# Patient Record
Sex: FEMALE | Race: BLACK OR AFRICAN AMERICAN | Employment: UNEMPLOYED
[De-identification: names, ages, dates, MRNs, and addresses within clinical notes are randomized per-mention and may not be internally consistent; named-entity substitution may affect disease eponyms.]

---

## 2017-01-06 ENCOUNTER — TELEPHONE (OUTPATIENT)
Dept: INTERNAL MEDICINE | Facility: CLINIC | Age: 57
End: 2017-01-06

## 2017-01-09 ENCOUNTER — TELEPHONE (OUTPATIENT)
Dept: INTERNAL MEDICINE | Facility: CLINIC | Age: 57
End: 2017-01-09

## 2017-01-27 ENCOUNTER — TELEPHONE (OUTPATIENT)
Dept: PODIATRY | Facility: CLINIC | Age: 57
End: 2017-01-27

## 2017-03-17 ENCOUNTER — OFFICE VISIT (OUTPATIENT)
Dept: INTERNAL MEDICINE | Age: 57
End: 2017-03-17
Payer: COMMERCIAL

## 2017-03-17 VITALS
HEART RATE: 84 BPM | DIASTOLIC BLOOD PRESSURE: 68 MMHG | WEIGHT: 263 LBS | SYSTOLIC BLOOD PRESSURE: 114 MMHG | BODY MASS INDEX: 46.59 KG/M2

## 2017-03-17 DIAGNOSIS — E11.65 CONTROLLED TYPE 2 DIABETES MELLITUS WITH HYPERGLYCEMIA, WITH LONG-TERM CURRENT USE OF INSULIN (HCC): Primary | ICD-10-CM

## 2017-03-17 DIAGNOSIS — F51.01 PRIMARY INSOMNIA: ICD-10-CM

## 2017-03-17 DIAGNOSIS — L23.9 ALLERGIC DERMATITIS: ICD-10-CM

## 2017-03-17 DIAGNOSIS — Z79.4 CONTROLLED TYPE 2 DIABETES MELLITUS WITH HYPERGLYCEMIA, WITH LONG-TERM CURRENT USE OF INSULIN (HCC): Primary | ICD-10-CM

## 2017-03-17 DIAGNOSIS — Z13.220 LIPID SCREENING: ICD-10-CM

## 2017-03-17 LAB — HBA1C MFR BLD: 5.4 %

## 2017-03-17 PROCEDURE — 83036 HEMOGLOBIN GLYCOSYLATED A1C: CPT | Performed by: INTERNAL MEDICINE

## 2017-03-17 PROCEDURE — 99213 OFFICE O/P EST LOW 20 MIN: CPT | Performed by: INTERNAL MEDICINE

## 2017-03-17 RX ORDER — TRIAMCINOLONE ACETONIDE 0.25 MG/G
CREAM TOPICAL
Qty: 1 TUBE | Refills: 1 | Status: SHIPPED | OUTPATIENT
Start: 2017-03-17 | End: 2018-04-05 | Stop reason: SDUPTHER

## 2017-03-17 RX ORDER — LANOLIN ALCOHOL/MO/W.PET/CERES
3 CREAM (GRAM) TOPICAL DAILY
Qty: 30 TABLET | Refills: 3 | Status: SHIPPED | OUTPATIENT
Start: 2017-03-17 | End: 2017-08-24 | Stop reason: SDUPTHER

## 2017-03-17 ASSESSMENT — PATIENT HEALTH QUESTIONNAIRE - PHQ9
1. LITTLE INTEREST OR PLEASURE IN DOING THINGS: 0
2. FEELING DOWN, DEPRESSED OR HOPELESS: 0
SUM OF ALL RESPONSES TO PHQ9 QUESTIONS 1 & 2: 0
SUM OF ALL RESPONSES TO PHQ QUESTIONS 1-9: 0

## 2017-03-22 ENCOUNTER — TELEPHONE (OUTPATIENT)
Dept: INTERNAL MEDICINE | Age: 57
End: 2017-03-22

## 2017-04-28 DIAGNOSIS — E78.2 MIXED HYPERLIPIDEMIA: ICD-10-CM

## 2017-04-28 DIAGNOSIS — I10 ESSENTIAL HYPERTENSION: ICD-10-CM

## 2017-04-28 DIAGNOSIS — R21 RASH AND NONSPECIFIC SKIN ERUPTION: ICD-10-CM

## 2017-04-28 DIAGNOSIS — E11.21 CONTROLLED TYPE 2 DIABETES MELLITUS WITH DIABETIC NEPHROPATHY, WITHOUT LONG-TERM CURRENT USE OF INSULIN (HCC): ICD-10-CM

## 2017-04-30 RX ORDER — GLIPIZIDE 5 MG/1
TABLET ORAL
Qty: 60 TABLET | Refills: 5 | Status: SHIPPED | OUTPATIENT
Start: 2017-04-30 | End: 2017-05-22 | Stop reason: SDUPTHER

## 2017-04-30 RX ORDER — CARVEDILOL 3.12 MG/1
TABLET ORAL
Qty: 60 TABLET | Refills: 5 | Status: SHIPPED | OUTPATIENT
Start: 2017-04-30 | End: 2017-05-22 | Stop reason: SDUPTHER

## 2017-04-30 RX ORDER — ATORVASTATIN CALCIUM 80 MG/1
TABLET, FILM COATED ORAL
Qty: 30 TABLET | Refills: 5 | Status: SHIPPED | OUTPATIENT
Start: 2017-04-30 | End: 2017-05-22 | Stop reason: SDUPTHER

## 2017-04-30 RX ORDER — ALCOHOL ANTISEPTIC PADS
PADS, MEDICATED (EA) TOPICAL
Qty: 30 EACH | Refills: 5 | Status: SHIPPED | OUTPATIENT
Start: 2017-04-30 | End: 2017-10-05 | Stop reason: SDUPTHER

## 2017-04-30 RX ORDER — INSULIN GLARGINE 100 [IU]/ML
INJECTION, SOLUTION SUBCUTANEOUS
Qty: 10 ML | Refills: 5 | Status: SHIPPED | OUTPATIENT
Start: 2017-04-30 | End: 2017-05-22 | Stop reason: SDUPTHER

## 2017-04-30 RX ORDER — CALCIUM CITRATE/VITAMIN D3 200MG-6.25
TABLET ORAL
Qty: 50 STRIP | Refills: 5 | Status: SHIPPED | OUTPATIENT
Start: 2017-04-30 | End: 2017-10-05 | Stop reason: SDUPTHER

## 2017-04-30 RX ORDER — GLUCOSAM/CHON-MSM1/C/MANG/BOSW 500-416.6
TABLET ORAL
Qty: 100 EACH | Refills: 5 | Status: SHIPPED | OUTPATIENT
Start: 2017-04-30 | End: 2017-10-05 | Stop reason: SDUPTHER

## 2017-04-30 RX ORDER — SYRINGE AND NEEDLE,INSULIN,1ML 31 GX5/16"
SYRINGE, EMPTY DISPOSABLE MISCELLANEOUS
Qty: 30 EACH | Refills: 5 | Status: SHIPPED | OUTPATIENT
Start: 2017-04-30 | End: 2019-01-11 | Stop reason: ALTCHOICE

## 2017-04-30 RX ORDER — DIPHENHYDRAMINE HCL 25 MG
CAPSULE ORAL
Qty: 30 CAPSULE | Refills: 0 | Status: SHIPPED | OUTPATIENT
Start: 2017-04-30 | End: 2017-05-22 | Stop reason: SDUPTHER

## 2017-05-01 ENCOUNTER — TELEPHONE (OUTPATIENT)
Dept: INTERNAL MEDICINE | Age: 57
End: 2017-05-01

## 2017-05-04 ENCOUNTER — TELEPHONE (OUTPATIENT)
Dept: INTERNAL MEDICINE | Age: 57
End: 2017-05-04

## 2017-05-22 DIAGNOSIS — K21.9 GASTROESOPHAGEAL REFLUX DISEASE WITHOUT ESOPHAGITIS: ICD-10-CM

## 2017-05-22 DIAGNOSIS — I10 ESSENTIAL HYPERTENSION: ICD-10-CM

## 2017-05-22 DIAGNOSIS — Z12.39 BREAST CANCER SCREENING: Primary | ICD-10-CM

## 2017-05-22 DIAGNOSIS — E11.21 CONTROLLED TYPE 2 DIABETES MELLITUS WITH DIABETIC NEPHROPATHY, WITHOUT LONG-TERM CURRENT USE OF INSULIN (HCC): ICD-10-CM

## 2017-05-22 DIAGNOSIS — E78.2 MIXED HYPERLIPIDEMIA: ICD-10-CM

## 2017-05-22 DIAGNOSIS — R21 RASH AND NONSPECIFIC SKIN ERUPTION: ICD-10-CM

## 2017-05-22 RX ORDER — ERGOCALCIFEROL (VITAMIN D2) 1250 MCG
50000 CAPSULE ORAL WEEKLY
Qty: 10 CAPSULE | Refills: 0 | OUTPATIENT
Start: 2017-05-22

## 2017-05-22 RX ORDER — FAMOTIDINE 20 MG/1
TABLET, FILM COATED ORAL
Qty: 60 TABLET | Refills: 5 | Status: SHIPPED | OUTPATIENT
Start: 2017-05-22 | End: 2017-11-28 | Stop reason: SDUPTHER

## 2017-05-22 RX ORDER — ATORVASTATIN CALCIUM 80 MG/1
TABLET, FILM COATED ORAL
Qty: 30 TABLET | Refills: 5 | Status: SHIPPED | OUTPATIENT
Start: 2017-05-22 | End: 2017-11-28 | Stop reason: SDUPTHER

## 2017-05-22 RX ORDER — IPRATROPIUM BROMIDE AND ALBUTEROL SULFATE 2.5; .5 MG/3ML; MG/3ML
3 SOLUTION RESPIRATORY (INHALATION) 4 TIMES DAILY PRN
Qty: 360 ML | Refills: 5 | Status: SHIPPED | OUTPATIENT
Start: 2017-05-22 | End: 2019-03-21 | Stop reason: SDUPTHER

## 2017-05-22 RX ORDER — CARVEDILOL 3.12 MG/1
TABLET ORAL
Qty: 60 TABLET | Refills: 5 | Status: SHIPPED | OUTPATIENT
Start: 2017-05-22 | End: 2017-11-28 | Stop reason: SDUPTHER

## 2017-05-22 RX ORDER — DIPHENHYDRAMINE HCL 25 MG
CAPSULE ORAL
Qty: 30 CAPSULE | Refills: 5 | Status: SHIPPED | OUTPATIENT
Start: 2017-05-22 | End: 2017-11-28 | Stop reason: SDUPTHER

## 2017-05-22 RX ORDER — GLIPIZIDE 5 MG/1
TABLET ORAL
Qty: 60 TABLET | Refills: 5 | Status: SHIPPED | OUTPATIENT
Start: 2017-05-22 | End: 2017-11-28 | Stop reason: SDUPTHER

## 2017-05-22 RX ORDER — INSULIN GLARGINE 100 [IU]/ML
INJECTION, SOLUTION SUBCUTANEOUS
Qty: 10 ML | Refills: 5 | Status: SHIPPED | OUTPATIENT
Start: 2017-05-22 | End: 2017-10-05 | Stop reason: SDUPTHER

## 2017-05-23 ENCOUNTER — TELEPHONE (OUTPATIENT)
Dept: INTERNAL MEDICINE | Age: 57
End: 2017-05-23

## 2017-05-24 ENCOUNTER — TELEPHONE (OUTPATIENT)
Dept: INTERNAL MEDICINE | Age: 57
End: 2017-05-24

## 2017-06-13 ENCOUNTER — OFFICE VISIT (OUTPATIENT)
Dept: INTERNAL MEDICINE | Age: 57
End: 2017-06-13
Payer: COMMERCIAL

## 2017-06-13 VITALS
HEART RATE: 83 BPM | DIASTOLIC BLOOD PRESSURE: 79 MMHG | BODY MASS INDEX: 47.13 KG/M2 | WEIGHT: 266 LBS | HEIGHT: 63 IN | SYSTOLIC BLOOD PRESSURE: 128 MMHG

## 2017-06-13 DIAGNOSIS — J15.9 COMMUNITY ACQUIRED BACTERIAL PNEUMONIA: Primary | ICD-10-CM

## 2017-06-13 DIAGNOSIS — F17.200 SMOKING: ICD-10-CM

## 2017-06-13 DIAGNOSIS — Z13.9 SCREENING: ICD-10-CM

## 2017-06-13 PROCEDURE — 99213 OFFICE O/P EST LOW 20 MIN: CPT | Performed by: INTERNAL MEDICINE

## 2017-06-13 RX ORDER — AZITHROMYCIN 250 MG/1
TABLET, FILM COATED ORAL
Qty: 1 PACKET | Refills: 0 | Status: SHIPPED | OUTPATIENT
Start: 2017-06-13 | End: 2017-06-23

## 2017-06-13 RX ORDER — NICOTINE 21 MG/24HR
1 PATCH, TRANSDERMAL 24 HOURS TRANSDERMAL DAILY
Qty: 30 PATCH | Refills: 3 | Status: SHIPPED | OUTPATIENT
Start: 2017-06-13 | End: 2017-10-31 | Stop reason: SDUPTHER

## 2017-06-13 RX ORDER — DOXYCYCLINE HYCLATE 100 MG
100 TABLET ORAL 2 TIMES DAILY
Qty: 10 TABLET | Refills: 0 | Status: CANCELLED | OUTPATIENT
Start: 2017-06-13 | End: 2017-06-18

## 2017-06-27 ENCOUNTER — HOSPITAL ENCOUNTER (OUTPATIENT)
Dept: MRI IMAGING | Facility: CLINIC | Age: 57
Discharge: HOME | End: 2017-06-27
Payer: COMMERCIAL

## 2017-06-27 ENCOUNTER — HOSPITAL ENCOUNTER (OUTPATIENT)
Dept: MAMMOGRAPHY | Facility: CLINIC | Age: 57
Discharge: HOME | End: 2017-06-27
Payer: COMMERCIAL

## 2017-06-27 DIAGNOSIS — M43.10 ACQUIRED SPONDYLOLISTHESIS: ICD-10-CM

## 2017-06-27 DIAGNOSIS — Z13.9 SCREENING: ICD-10-CM

## 2017-06-27 PROCEDURE — G0202 SCR MAMMO BI INCL CAD: HCPCS

## 2017-07-27 ENCOUNTER — HOSPITAL ENCOUNTER (OUTPATIENT)
Dept: MRI IMAGING | Facility: CLINIC | Age: 57
Discharge: HOME | End: 2017-07-27
Payer: COMMERCIAL

## 2017-07-27 DIAGNOSIS — M43.10 ACQUIRED SPONDYLOLISTHESIS: ICD-10-CM

## 2017-07-27 PROCEDURE — 72148 MRI LUMBAR SPINE W/O DYE: CPT

## 2017-08-01 ENCOUNTER — TELEPHONE (OUTPATIENT)
Dept: INTERNAL MEDICINE | Age: 57
End: 2017-08-01

## 2017-08-24 DIAGNOSIS — F51.01 PRIMARY INSOMNIA: ICD-10-CM

## 2017-08-24 RX ORDER — LANOLIN ALCOHOL/MO/W.PET/CERES
CREAM (GRAM) TOPICAL
Qty: 30 TABLET | Refills: 3 | Status: SHIPPED | OUTPATIENT
Start: 2017-08-24 | End: 2017-12-26 | Stop reason: SDUPTHER

## 2017-10-05 ENCOUNTER — OFFICE VISIT (OUTPATIENT)
Dept: INTERNAL MEDICINE | Age: 57
End: 2017-10-05
Payer: COMMERCIAL

## 2017-10-05 ENCOUNTER — HOSPITAL ENCOUNTER (OUTPATIENT)
Age: 57
Setting detail: SPECIMEN
Discharge: HOME | End: 2017-10-05
Payer: COMMERCIAL

## 2017-10-05 VITALS
SYSTOLIC BLOOD PRESSURE: 124 MMHG | BODY MASS INDEX: 49.96 KG/M2 | DIASTOLIC BLOOD PRESSURE: 76 MMHG | WEIGHT: 282 LBS | HEART RATE: 89 BPM | HEIGHT: 63 IN

## 2017-10-05 DIAGNOSIS — Z79.4 CONTROLLED TYPE 2 DIABETES MELLITUS WITH HYPERGLYCEMIA, WITH LONG-TERM CURRENT USE OF INSULIN (HCC): ICD-10-CM

## 2017-10-05 DIAGNOSIS — E11.65 CONTROLLED TYPE 2 DIABETES MELLITUS WITH HYPERGLYCEMIA, WITH LONG-TERM CURRENT USE OF INSULIN (HCC): Primary | ICD-10-CM

## 2017-10-05 DIAGNOSIS — I10 ESSENTIAL HYPERTENSION: ICD-10-CM

## 2017-10-05 DIAGNOSIS — E66.01 MORBID OBESITY WITH BMI OF 40.0-44.9, ADULT (HCC): ICD-10-CM

## 2017-10-05 DIAGNOSIS — D50.9 IRON DEFICIENCY ANEMIA, UNSPECIFIED IRON DEFICIENCY ANEMIA TYPE: ICD-10-CM

## 2017-10-05 DIAGNOSIS — E55.9 VITAMIN D DEFICIENCY: ICD-10-CM

## 2017-10-05 DIAGNOSIS — E11.65 CONTROLLED TYPE 2 DIABETES MELLITUS WITH HYPERGLYCEMIA, WITH LONG-TERM CURRENT USE OF INSULIN (HCC): ICD-10-CM

## 2017-10-05 DIAGNOSIS — Z79.4 CONTROLLED TYPE 2 DIABETES MELLITUS WITH HYPERGLYCEMIA, WITH LONG-TERM CURRENT USE OF INSULIN (HCC): Primary | ICD-10-CM

## 2017-10-05 LAB
CREATININE URINE: 33 MG/DL (ref 28–217)
GLUCOSE BLD-MCNC: 285 MG/DL
MICROALBUMIN/CREAT 24H UR: <12 MG/L
MICROALBUMIN/CREAT UR-RTO: 36 MCG/MG CREAT

## 2017-10-05 PROCEDURE — 82962 GLUCOSE BLOOD TEST: CPT | Performed by: INTERNAL MEDICINE

## 2017-10-05 PROCEDURE — 99213 OFFICE O/P EST LOW 20 MIN: CPT | Performed by: INTERNAL MEDICINE

## 2017-10-05 RX ORDER — OXYCODONE HYDROCHLORIDE 10 MG/1
TABLET ORAL
COMMUNITY
Start: 2017-07-12 | End: 2019-03-21

## 2017-10-05 RX ORDER — CEPHALEXIN 500 MG/1
CAPSULE ORAL
COMMUNITY
Start: 2017-09-20 | End: 2017-10-05 | Stop reason: HOSPADM

## 2017-10-05 RX ORDER — NYSTATIN 100000 [USP'U]/G
POWDER TOPICAL
COMMUNITY
Start: 2017-09-28 | End: 2018-04-05 | Stop reason: SDUPTHER

## 2017-10-05 RX ORDER — GABAPENTIN 300 MG/1
CAPSULE ORAL
COMMUNITY
Start: 2017-09-28 | End: 2017-10-05 | Stop reason: DRUGHIGH

## 2017-10-05 RX ORDER — GABAPENTIN 100 MG/1
CAPSULE ORAL
COMMUNITY
Start: 2017-08-24 | End: 2017-10-05 | Stop reason: DRUGHIGH

## 2017-10-05 RX ORDER — DOCUSATE SODIUM 100 MG/1
CAPSULE ORAL
COMMUNITY
Start: 2017-09-12 | End: 2019-10-24

## 2017-10-05 RX ORDER — OXYCODONE HYDROCHLORIDE AND ACETAMINOPHEN 5; 325 MG/1; MG/1
TABLET ORAL
COMMUNITY
Start: 2017-09-22 | End: 2018-02-15 | Stop reason: SDUPTHER

## 2017-10-05 RX ORDER — PEN NEEDLE, DIABETIC 32 GX 1/4"
NEEDLE, DISPOSABLE MISCELLANEOUS
COMMUNITY
Start: 2017-07-25 | End: 2017-10-05 | Stop reason: SDUPTHER

## 2017-10-05 RX ORDER — GABAPENTIN 600 MG/1
TABLET ORAL
COMMUNITY
Start: 2017-09-20 | End: 2019-03-21 | Stop reason: ALTCHOICE

## 2017-10-05 RX ORDER — TRAMADOL HYDROCHLORIDE 50 MG/1
TABLET ORAL
COMMUNITY
Start: 2017-09-28 | End: 2018-04-05 | Stop reason: SDUPTHER

## 2017-10-05 RX ORDER — DIAZEPAM 5 MG/1
TABLET ORAL
COMMUNITY
Start: 2017-07-12 | End: 2018-04-19 | Stop reason: ALTCHOICE

## 2017-10-05 RX ORDER — GLUCOSAM/CHON-MSM1/C/MANG/BOSW 500-416.6
TABLET ORAL
Qty: 100 EACH | Refills: 5 | Status: SHIPPED | OUTPATIENT
Start: 2017-10-05 | End: 2018-05-01 | Stop reason: SDUPTHER

## 2017-10-05 RX ORDER — SULFAMETHOXAZOLE AND TRIMETHOPRIM 800; 160 MG/1; MG/1
TABLET ORAL
COMMUNITY
Start: 2017-09-22 | End: 2018-02-15 | Stop reason: SDUPTHER

## 2017-10-05 RX ORDER — INSULIN GLARGINE 100 [IU]/ML
INJECTION, SOLUTION SUBCUTANEOUS
Qty: 10 ML | Refills: 5 | Status: SHIPPED | OUTPATIENT
Start: 2017-10-05 | End: 2017-11-06 | Stop reason: ALTCHOICE

## 2017-10-05 RX ORDER — LANOLIN ALCOHOL/MO/W.PET/CERES
3 CREAM (GRAM) TOPICAL
COMMUNITY
End: 2017-12-27 | Stop reason: SDUPTHER

## 2017-10-05 RX ORDER — SYRINGE-NEEDLE,INSULIN,0.5 ML 31 GX5/16"
SYRINGE, EMPTY DISPOSABLE MISCELLANEOUS
COMMUNITY
Start: 2017-08-24 | End: 2019-01-11 | Stop reason: ALTCHOICE

## 2017-10-05 RX ORDER — ALCOHOL ANTISEPTIC PADS
PADS, MEDICATED (EA) TOPICAL
Qty: 30 EACH | Refills: 5 | Status: SHIPPED | OUTPATIENT
Start: 2017-10-05 | End: 2019-05-31 | Stop reason: SDUPTHER

## 2017-10-05 RX ORDER — PEN NEEDLE, DIABETIC 32 GX 1/4"
NEEDLE, DISPOSABLE MISCELLANEOUS
Qty: 30 EACH | Refills: 3 | Status: SHIPPED | OUTPATIENT
Start: 2017-10-05 | End: 2018-04-06 | Stop reason: SDUPTHER

## 2017-10-05 NOTE — MR AVS SNAPSHOT
Blood Pressure Pulse Height Weight Body Mass Index Smoking Status    124/76 (Site: Left Arm, Position: Sitting, Cuff Size: Large Adult) 89 5' 3\" (1.6 m) 282 lb (127.9 kg) 49.95 kg/m2 Current Every Day Smoker      Additional Information about your Body Mass Index (BMI)           Your BMI as listed above is considered obese (30 or more). BMI is an estimate of body fat, calculated from your height and weight. The higher your BMI, the greater your risk of heart disease, high blood pressure, type 2 diabetes, stroke, gallstones, arthritis, sleep apnea, and certain cancers. BMI is not perfect. It may overestimate body fat in athletes and people who are more muscular. Even a small weight loss (between 5 and 10 percent of your current weight) by decreasing your calorie intake and becoming more physically active will help lower your risk of developing or worsening diseases associated with obesity. Learn more at: [email or web address]          Instructions    Follow-up appointment scheduled for 10/19/17, AVS given to patient. Labs given to patient, they will have them done before their next visit. Printed script for Wilfredo signed and given to pt    LJ            Today's Medication Changes          These changes are accurate as of: 10/5/17  9:32 AM.  If you have any questions, ask your nurse or doctor. CHANGE how you take these medications           * B-D SINGLE USE SWABS REGULAR Pads   Instructions:  USE AS DIRECTED   Quantity:  200 each   Refills:  6   What changed:  Another medication with the same name was changed. Make sure you understand how and when to take each. Changed by:  John Soto MD       * CURITY ALCOHOL PREPS 70 % Pads   Instructions:  USE AS DIRECTED (THREE TIMES DAILY)   Quantity:  30 each   Refills:  5   What changed:  See the new instructions.    Changed by:  John Soto MD       EASY TOUCH PEN NEEDLES 32G X 6 MM Misc atorvastatin (LIPITOR) 80 MG tablet TAKE 1 TABLET BY MOUTH ONCE DAILY    glipiZIDE (GLUCOTROL) 5 MG tablet TAKE 1 TABLET BY MOUTH 2 TIMES DAILY    carvedilol (COREG) 3.125 MG tablet TAKE 1 TABLET BY MOUTH TWICE DAILY    diphenhydrAMINE (BENADRYL) 25 MG capsule TAKE 1 CAPSULE BY MOUTH TWICE DAILY AS NEEDED FOR ITCHING AS DIRECTED    famotidine (PEPCID) 20 MG tablet TAKE 1 TABLET BY MOUTH 2 TIMES DAILY    ipratropium-albuterol (DUONEB) 0.5-2.5 (3) MG/3ML SOLN nebulizer solution Inhale 3 mLs into the lungs 4 times daily as needed for Shortness of Breath    ULTICARE INSULIN SYRINGE 31G X 5/16\" 1 ML MISC USE AS DIRECTED    triamcinolone (KENALOG) 0.025 % cream Apply topically 2 times daily.     vitamin D (ERGOCALCIFEROL) 89255 UNITS capsule Take 1 capsule by mouth once a week    Incontinence Supplies MISC Use as needed for incontinence    Alcohol Swabs (B-D SINGLE USE SWABS REGULAR) PADS USE AS DIRECTED    aspirin (ASPIRIN LOW DOSE) 81 MG EC tablet TAKE 1 TABLET DAILY    TRUEPLUS LANCETS 33G MISC USE AS DIRECTED THREE TIMES A DAY      Allergies              Lisinopril Swelling    Ace Inhibitors Swelling    ANGIOEDEMA    Ciprofloxacin Hives    Metaxalone     Other Hives, Swelling    seafood    Penicillins Hives, Swelling      We Ordered/Performed the following           Microalbumin, Ur     POCT Glucose          Result Summary for POCT Glucose      Result Information       Status          Abnormal Final result (Collected: 10/5/2017  8:43 AM)           10/5/2017  8:43 AM      Component Results     Component Value Ref Range & Units Status    Glucose 285 mg/dL Final               Additional Information        Basic Information     Date Of Birth Sex Race Ethnicity Preferred Language    1960 Female Black Non-/Non  English      Problem List as of 10/5/2017  Date Reviewed: 6/13/2017                Diverticulitis of large intestine with abscess without bleeding    Colon obstruction Acute cystitis with hematuria    Hypokalemia    Vitamin D deficiency    Hypocalcemia    Controlled type 2 diabetes mellitus with hyperglycemia, with long-term current use of insulin (HCC)    Hyperglycemia    MIKY (obstructive sleep apnea)    Atelectasis    SBO (small bowel obstruction)    Sigmoid stricture    Generalized abdominal pain    Diabetes mellitus type 2, controlled (HCC)    Chronic back pain    Narcotic abuse    Essential hypertension    Incisional hernia, without obstruction or gangrene    Wound, open, abdominal wall, anterior    Morbid obesity with BMI of 40.0-44.9, adult ([facility] [address]    Leukocytosis      Immunizations as of 10/5/2017     Name Date    Tdap (Boostrix, Adacel) 9/6/2016      Preventive Care        Date Due    Pneumococcal Vaccine - Pneumovax for adults aged 19-64 years with: chronic heart disease, chronic lung disease, diabetes mellitus, alcoholism, chronic liver disease, or cigarette smoking. (1 of 1 - PPSV23) 12/26/1980    Colonoscopy 12/26/2011    Urine Check For Kidney Problems 5/27/2016    Cholesterol Screening 5/27/2016    Eye Exam By An Eye Doctor 6/6/2017    Yearly Flu Vaccine (1) 9/1/2017    Diabetic Foot Exam 9/6/2017    HIV screening is recommended for all people regardless of risk factors  aged 15-65 years at least once (lifetime) who have never been HIV tested. 3/17/2018 (Originally 12/26/1976)    Hemoglobin A1C (Test For Long-Term Glucose Control) 3/17/2018    Pap Smear 4/7/2018    Mammograms are recommended every 2 years for low/average risk patients aged 48 - 69, and every year for high risk patients per updated national guidelines. However these guidelines can be individualized by your provider. 6/27/2019    Tetanus Combination Vaccine (2 - Td) 9/6/2026            MyChart Signup           Our records indicate that you have declined MyChart signup.

## 2017-10-05 NOTE — PROGRESS NOTES
[facility]  [facility] [address] [facility]  [address] [facility] [address] [facility]  Dept: [phone]  Dept Fax: [phone]    Office Progress/Follow Up Note  Date of patient's visit: 10/5/2017  Patient's Name:  Alta Castellanos YOB: 1960            Patient Care Team:  Jack Albright MD as PCP - General (Internal Medicine)  ================================================================    REASON FOR VISIT/CHIEF COMPLAINT:  Diabetes (runnung high for last 5 days) and Health Maintenance (patient does not want to talk about vaccines.)    HISTORY OF PRESENTING ILLNESS:  History was obtained from: patient, electronic medical record. Alta Castellanos is a 55 y.o. is here for a follow up visit because of her BG running high. Pt stopped taking lantus for reasons unknown. She states someone stopped her lantus. She is taking metformin and glipizide. Her BG is running in 300-400's. Her BG this AM was 420. BG in the clinic is 278 after she took metformin. She states she us thirsty a lot. She doesn't have increased urination. She states she is eating junk now. A1c in clinic is 9.5. It was 5.4 in 3/17.        Patient Active Problem List   Diagnosis    Diverticulitis of large intestine with abscess without bleeding    Leukocytosis    Morbid obesity with BMI of 40.0-44.9, adult ([address]    Wound, open, abdominal wall, anterior    Incisional hernia, without obstruction or gangrene    Pulmonary embolism ([address]    Essential hypertension    Chronic back pain    Narcotic abuse    Diabetes mellitus type 2, controlled ([address]    Generalized abdominal pain    SBO (small bowel obstruction)    Sigmoid stricture    Controlled type 2 diabetes mellitus with hyperglycemia, with long-term current use of insulin (HCC)    Hyperglycemia    MIKY (obstructive sleep apnea)    Atelectasis    Hypocalcemia    Vitamin D deficiency    Acute cystitis with hematuria    Hypokalemia    Colon obstruction       Health Maintenance Due   Topic Date Due    Pneumococcal med risk (1 of 1 - PPSV23) 12/26/1980    Colon cancer screen colonoscopy  12/26/2011    Diabetic microalbuminuria test  05/27/2016    Lipid screen  05/27/2016    Diabetic retinal exam  06/06/2017    Flu vaccine (1) 09/01/2017    Diabetic foot exam  09/06/2017       Allergies   Allergen Reactions    Lisinopril Swelling    Ace Inhibitors Swelling     ANGIOEDEMA    Ciprofloxacin Hives    Metaxalone     Other Hives and Swelling     seafood    Penicillins Hives and Swelling         Current Outpatient Prescriptions   Medication Sig Dispense Refill    cephALEXin (KEFLEX) 500 MG capsule       diazepam (VALIUM) 5 MG tablet       DOCQLACE 100 MG capsule       gabapentin (NEURONTIN) 100 MG capsule       gabapentin (NEURONTIN) 300 MG capsule       gabapentin (NEURONTIN) 600 MG tablet       EASY TOUCH PEN NEEDLES 32G X 6 MM MISC       TRUEPLUS INSULIN SYRINGE 31G X 5/16\" 0.5 ML MISC       nystatin (MYCOSTATIN) 241944 UNIT/GM powder       oxyCODONE HCl (OXY-IR) 10 MG immediate release tablet       oxyCODONE-acetaminophen (PERCOCET) 5-325 MG per tablet       sulfamethoxazole-trimethoprim (BACTRIM DS;SEPTRA DS) 800-160 MG per tablet       traMADol (ULTRAM) 50 MG tablet       melatonin 3 MG TABS tablet Take 3 mg by mouth      melatonin 3 MG TABS tablet TAKE ONE TABLET BY MOUTH AT BEDTIME AS DIRECTED 30 tablet 3    nicotine (NICODERM CQ) 14 MG/24HR Place 1 patch onto the skin daily 30 patch 3    atorvastatin (LIPITOR) 80 MG tablet TAKE 1 TABLET BY MOUTH ONCE DAILY 30 tablet 5    glipiZIDE (GLUCOTROL) 5 MG tablet TAKE 1 TABLET BY MOUTH 2 TIMES DAILY 60 tablet 5    insulin glargine (LANTUS) 100 UNIT/ML injection vial INJECT 30 UNITS SUBCUTANEOUSLY NIGHTLY AS DIRECTED 10 mL 5    carvedilol (COREG) 3.125 MG tablet TAKE 1 TABLET BY MOUTH TWICE DAILY 60 tablet 5    diphenhydrAMINE (BENADRYL) 25 MG capsule TAKE 1 CAPSULE BY MOUTH pain, dyspnea on exertion  RESPIRATORY: Denies: cough, hemoptysis, shortness of breath  GI: Denies: Denies: abdominal pain, flank pain  : Denies: Denies: dysuria, frequency/urgency  NEURO: Denies: dizzy/vertigo, headache  Endocrinology: increased thirst.   MUSCULOSKELETAL: Denies: back pain, joint pain  SKIN: Denies: rash, itching  ROS    PHYSICAL EXAM:  Vitals:    10/05/17 0832   BP: 124/76   Site: Left Arm   Position: Sitting   Cuff Size: Large Adult   Pulse: 89   Weight: 282 lb (127.9 kg)   Height: 5' 3\" (1.6 m)     BP Readings from Last 3 Encounters:   10/05/17 124/76   06/13/17 128/79   03/17/17 114/68      General appearance - alert, well appearing, and in no distress  Mental status - alert, oriented to person, place, and time  Chest - clear to auscultation, no wheezes, rales or rhonchi, symmetric air entry  Heart - normal rate, regular rhythm, normal S1, S2, no murmurs, rubs, clicks or gallops  Abdomen - soft, nontender, nondistended, no masses or organomegaly  Back exam - full range of motion, no tenderness, palpable spasm or pain on motion  Neurological - alert, oriented, normal speech, no focal findings or movement disorder noted  Musculoskeletal - no joint tenderness, deformity or swelling  Extremities - peripheral pulses normal, no pedal edema, no clubbing or cyanosis  Skin - normal coloration and turgor, no rashes, no suspicious skin lesions noted    Physical Exam      DIAGNOSTIC FINDINGS:  CBC:  Lab Results   Component Value Date    WBC 12.1 01/05/2017    HGB 8.4 01/05/2017     01/05/2017       BMP:    Lab Results   Component Value Date     01/05/2017    K 3.8 01/05/2017    CL 99 01/05/2017    CO2 22 01/05/2017    BUN 5 01/05/2017    CREATININE 0.42 01/05/2017    GLUCOSE 285 10/05/2017       HEMOGLOBIN A1C:   Lab Results   Component Value Date    LABA1C 5.4 03/17/2017       FASTING LIPID PANEL:  Lab Results   Component Value Date    CHOL 109 05/27/2015    HDL 32 (L) 05/27/2015    TRIG 230 (H) 12/27/2016       ASSESSMENT AND PLAN:  Kishor Rueda was seen today for diabetes and health maintenance. Diagnoses and all orders for this visit:    Controlled type 2 diabetes mellitus with hyperglycemia, with long-term current use of insulin ([facility])  -     POCT Glucose  -     EASY TOUCH PEN NEEDLES 32G X 6 MM MISC; Use to take lantus daily  -     insulin glargine (LANTUS) 100 UNIT/ML injection vial; INJECT 30 UNITS SUBCUTANEOUSLY NIGHTLY AS DIRECTED. increase Lantus by 2 units every 2-3 days till FBS is ~100-120.  -     Alcohol Swabs (CURITY ALCOHOL PREPS) 70 % PADS; USE AS DIRECTED (THREE TIMES DAILY)  -     TRUEPLUS LANCETS 28G MISC; USE AS DIRECTED THREE TIMES DAILY  -     glucose blood VI test strips (TRUE METRIX BLOOD GLUCOSE TEST) strip; USE AS DIRECTED TO TEST BLOOD SUGAR ONCE DAILY AS NEEDED AS DIRECTED  -     Microalbumin, Ur  -     metFORMIN (GLUCOPHAGE) 1000 MG tablet; Take 1 tablet by mouth 2 times daily (with meals) TAKE 1 TABLET BY MOUTH DAILY WITH BREAKFAST  -     Basic Metabolic Panel; Future  -     TSH With Reflex Ft4; Future  -     CBC; Future      FOLLOW UP AND INSTRUCTIONS:  Return in about 2 weeks (around 10/19/2017). · Kishor Rueda received counseling on the following healthy behaviors: nutrition, exercise and medication adherence    · Discussed use, benefit, and side effects of prescribed medications. Barriers to medication compliance addressed. All patient questions answered. Pt voiced understanding. · Patient given educational materials - see patient instructions      Kurt Villalpando MD,   PGY-3 Internal Medicine Resident. [id number] [facility].   10/5/2017  9:06 AM

## 2017-10-05 NOTE — PATIENT INSTRUCTIONS
Follow-up appointment scheduled for 10/19/17, AVS given to patient. Labs given to patient, they will have them done before their next visit.      Printed script for Wilfredo signed and given to pt    LJ

## 2017-10-05 NOTE — PROGRESS NOTES
 Diabetic hemoglobin A1C test  03/17/2018    Cervical cancer screen  04/07/2018    Breast cancer screen  06/27/2019    DTaP/Tdap/Td vaccine (2 - Td) 09/06/2026    Hepatitis C screen  Completed

## 2017-10-09 ENCOUNTER — TELEPHONE (OUTPATIENT)
Dept: INTERNAL MEDICINE | Age: 57
End: 2017-10-09

## 2017-10-09 RX ORDER — IBUPROFEN 200 MG
1 TABLET ORAL DAILY
Qty: 100 EACH | Refills: 3 | Status: SHIPPED | OUTPATIENT
Start: 2017-10-09 | End: 2019-01-11 | Stop reason: ALTCHOICE

## 2017-10-09 RX ORDER — ALCOHOL ANTISEPTIC PADS
PADS, MEDICATED (EA) TOPICAL
Qty: 100 EACH | Refills: 5 | Status: SHIPPED | OUTPATIENT
Start: 2017-10-09 | End: 2018-05-01 | Stop reason: SDUPTHER

## 2017-10-27 ENCOUNTER — HOSPITAL ENCOUNTER (OUTPATIENT)
Age: 57
Setting detail: SPECIMEN
Discharge: HOME | End: 2017-10-27
Payer: COMMERCIAL

## 2017-10-27 DIAGNOSIS — E55.9 VITAMIN D DEFICIENCY: ICD-10-CM

## 2017-10-27 DIAGNOSIS — Z79.4 CONTROLLED TYPE 2 DIABETES MELLITUS WITH HYPERGLYCEMIA, WITH LONG-TERM CURRENT USE OF INSULIN (HCC): ICD-10-CM

## 2017-10-27 DIAGNOSIS — R79.89 LOW VITAMIN D LEVEL: Primary | ICD-10-CM

## 2017-10-27 DIAGNOSIS — D50.9 IRON DEFICIENCY ANEMIA, UNSPECIFIED IRON DEFICIENCY ANEMIA TYPE: ICD-10-CM

## 2017-10-27 DIAGNOSIS — E11.65 CONTROLLED TYPE 2 DIABETES MELLITUS WITH HYPERGLYCEMIA, WITH LONG-TERM CURRENT USE OF INSULIN (HCC): ICD-10-CM

## 2017-10-27 LAB
ANION GAP SERPL CALCULATED.3IONS-SCNC: 14 MMOL/L (ref 9–17)
BUN BLDV-MCNC: 7 MG/DL (ref 6–20)
BUN/CREAT BLD: ABNORMAL (ref 9–20)
CALCIUM SERPL-MCNC: 9 MG/DL (ref 8.6–10.4)
CHLORIDE BLD-SCNC: 97 MMOL/L (ref 98–107)
CO2: 25 MMOL/L (ref 20–31)
CREAT SERPL-MCNC: 0.65 MG/DL (ref 0.5–0.9)
GFR AFRICAN AMERICAN: >60 ML/MIN
GFR NON-AFRICAN AMERICAN: >60 ML/MIN
GFR SERPL CREATININE-BSD FRML MDRD: ABNORMAL ML/MIN/{1.73_M2}
GFR SERPL CREATININE-BSD FRML MDRD: ABNORMAL ML/MIN/{1.73_M2}
GLUCOSE BLD-MCNC: 246 MG/DL (ref 70–99)
HCT VFR BLD CALC: 39 % (ref 36–46)
HEMOGLOBIN: 12.4 G/DL (ref 12–16)
IRON SATURATION: 20 % (ref 20–55)
IRON: 70 UG/DL (ref 37–145)
MCH RBC QN AUTO: 26.6 PG (ref 26–34)
MCHC RBC AUTO-ENTMCNC: 31.7 G/DL (ref 31–37)
MCV RBC AUTO: 83.9 FL (ref 80–100)
PDW BLD-RTO: 15.9 % (ref 12.5–15.4)
PLATELET # BLD: 313 K/UL (ref 140–450)
PMV BLD AUTO: 10.6 FL (ref 6–12)
POTASSIUM SERPL-SCNC: 3.9 MMOL/L (ref 3.7–5.3)
RBC # BLD: 4.65 M/UL (ref 4–5.2)
SODIUM BLD-SCNC: 136 MMOL/L (ref 135–144)
TOTAL IRON BINDING CAPACITY: 348 UG/DL (ref 250–450)
TSH SERPL DL<=0.05 MIU/L-ACNC: 2.26 MIU/L (ref 0.3–5)
UNSATURATED IRON BINDING CAPACITY: 278 UG/DL (ref 112–347)
VITAMIN D 25-HYDROXY: 19 NG/ML (ref 30–100)
WBC # BLD: 10.4 K/UL (ref 3.5–11)

## 2017-10-27 PROCEDURE — 80048 BASIC METABOLIC PNL TOTAL CA: CPT

## 2017-10-27 PROCEDURE — 83550 IRON BINDING TEST: CPT

## 2017-10-27 PROCEDURE — 36415 COLL VENOUS BLD VENIPUNCTURE: CPT

## 2017-10-27 PROCEDURE — 84443 ASSAY THYROID STIM HORMONE: CPT

## 2017-10-27 PROCEDURE — 82306 VITAMIN D 25 HYDROXY: CPT

## 2017-10-27 PROCEDURE — 85027 COMPLETE CBC AUTOMATED: CPT

## 2017-10-27 PROCEDURE — 83540 ASSAY OF IRON: CPT

## 2017-10-27 RX ORDER — M-VIT,TX,IRON,MINS/CALC/FOLIC 27MG-0.4MG
1 TABLET ORAL DAILY
Qty: 30 TABLET | Refills: 11 | Status: SHIPPED | OUTPATIENT
Start: 2017-10-27 | End: 2019-03-21 | Stop reason: ALTCHOICE

## 2017-10-27 RX ORDER — INSULIN GLARGINE 100 [IU]/ML
INJECTION, SOLUTION SUBCUTANEOUS
Qty: 10 ML | Refills: 5 | Status: CANCELLED | OUTPATIENT
Start: 2017-10-27

## 2017-10-27 NOTE — TELEPHONE ENCOUNTER
Pt calling she lost script for the Lantus and would like that sent to [facility]. Pt also needs a script for nebulizer machine and wants that sent to [facility] pended medication. Script for nebulizer will need to be printed out so we can fax it.

## 2017-10-31 DIAGNOSIS — F17.200 SMOKING: ICD-10-CM

## 2017-11-03 ENCOUNTER — TELEPHONE (OUTPATIENT)
Dept: INTERNAL MEDICINE | Age: 57
End: 2017-11-03

## 2017-11-03 DIAGNOSIS — E11.8 DM (DIABETES MELLITUS) WITH COMPLICATIONS (HCC): Primary | ICD-10-CM

## 2017-11-03 NOTE — TELEPHONE ENCOUNTER
PA request received for Lantus. This is non-formulary.  Pt must try/fail/have contraindication to formulary agent(s):Basaglar

## 2017-11-06 NOTE — TELEPHONE ENCOUNTER
PC from patient returning call, let her know that the Lantus wasn't covered under the insurance so they would be switching her to the basaglar. Pt grew irritated wanting to know why as that was what she had always been given, tried letting patient know it was due to changes in the insurances formulary and what they would cover. But the [facility] was another form of insulin that was covered and the [facility] for it had been sent to the pharmacy. Pt said so you guys just have to find a way yo give me a kwikpen well fuck that! I will be in on the 30th and I will discuss that with my doctor then! And hung up.

## 2017-11-17 ENCOUNTER — TELEPHONE (OUTPATIENT)
Dept: INTERNAL MEDICINE | Age: 57
End: 2017-11-17

## 2017-11-17 DIAGNOSIS — N89.8 VAGINAL DISCHARGE: Primary | ICD-10-CM

## 2017-11-17 NOTE — TELEPHONE ENCOUNTER
Pt calling states that since she started taking Multiple Vitamins-Minerals she had been itching, She is also having yellow vaginal discharge, pt is currently using powder to help with the itching. Pt states it only started since taking the medication. Pt states she also had a increase of Metformin. Pt wants to know if there is anything she can take to relive the itching. Please advise pt also states as of today she will stop taking the vitamin.

## 2017-11-28 DIAGNOSIS — K21.9 GASTROESOPHAGEAL REFLUX DISEASE WITHOUT ESOPHAGITIS: ICD-10-CM

## 2017-11-28 DIAGNOSIS — I10 ESSENTIAL HYPERTENSION: ICD-10-CM

## 2017-11-28 DIAGNOSIS — R21 RASH AND NONSPECIFIC SKIN ERUPTION: ICD-10-CM

## 2017-11-28 DIAGNOSIS — E78.2 MIXED HYPERLIPIDEMIA: ICD-10-CM

## 2017-11-28 DIAGNOSIS — E11.21 CONTROLLED TYPE 2 DIABETES MELLITUS WITH DIABETIC NEPHROPATHY, WITHOUT LONG-TERM CURRENT USE OF INSULIN (HCC): ICD-10-CM

## 2017-11-29 NOTE — TELEPHONE ENCOUNTER
E-scribe request for [employer]. Please review and e-scribe if applicable. Next Visit Date:  Pt has appt tomorrow  Future Appointments  Date Time Provider Genna Tran   11/30/2017 3:05 PM Kurt Villalpando MD [facility] IM [facility]   1/4/2018 2:00 PM Stephen Moore DO [facility] OB/Gyn [facility]rt Maintenance   Topic Date Due    Pneumococcal med risk (1 of 1 - PPSV23) 12/26/1980    Colon cancer screen colonoscopy  12/26/2011    Lipid screen  05/27/2016    Diabetic retinal exam  06/06/2017    Flu vaccine (1) 09/01/2017    Diabetic foot exam  09/06/2017    HIV screen  03/17/2018 (Originally 12/26/1976)    Diabetic hemoglobin A1C test  03/17/2018    Cervical cancer screen  04/07/2018    Diabetic microalbuminuria test  10/05/2018    Breast cancer screen  06/27/2019    DTaP/Tdap/Td vaccine (2 - Td) 09/06/2026    Hepatitis C screen  Completed               (applicable per patient's age: Cancer Screenings, Depression Screening, Fall Risk Screening, Immunizations)    Hemoglobin A1C (%)   Date Value   03/17/2017 5.4   09/06/2016 6.2   03/01/2016 6.4     Microalb/Crt.  Ratio (mcg/mg creat)   Date Value   10/05/2017 36 (H)     LDL Cholesterol (mg/dL)   Date Value   05/27/2015 52     AST (U/L)   Date Value   01/02/2017 17     ALT (U/L)   Date Value   01/02/2017 12     BUN (mg/dL)   Date Value   10/27/2017 7      (goal A1C is < 7)   (goal LDL is <100) need 30-50% reduction from baseline     BP Readings from Last 3 Encounters:   10/05/17 124/76   06/13/17 128/79   03/17/17 114/68    (goal /80)      All Future Testing planned in [facility]:  Lab Frequency Next Occurrence   Lipid Panel Once 12/12/2017            Patient Active Problem List:     Diverticulitis of large intestine with abscess without bleeding     Leukocytosis     Morbid obesity with BMI of 40.0-44.9, adult ([address]     Wound, open, abdominal wall, anterior     Incisional hernia, without obstruction or gangrene     Pulmonary embolism (HCC)     Essential hypertension     Chronic back pain     Narcotic abuse     Diabetes mellitus type 2, controlled ([address]     Generalized abdominal pain     SBO (small bowel obstruction)     Sigmoid stricture     Controlled type 2 diabetes mellitus with hyperglycemia, with long-term current use of insulin (HCC)     Hyperglycemia     MIKY (obstructive sleep apnea)     Atelectasis     Hypocalcemia     Vitamin D deficiency     Acute cystitis with hematuria     Hypokalemia     Colon obstruction

## 2017-11-30 RX ORDER — FAMOTIDINE 20 MG/1
TABLET, FILM COATED ORAL
Qty: 60 TABLET | Refills: 3 | Status: SHIPPED | OUTPATIENT
Start: 2017-11-30 | End: 2018-04-06 | Stop reason: SDUPTHER

## 2017-11-30 RX ORDER — GLIPIZIDE 5 MG/1
TABLET ORAL
Qty: 60 TABLET | Refills: 3 | Status: SHIPPED | OUTPATIENT
Start: 2017-11-30 | End: 2018-04-06 | Stop reason: SDUPTHER

## 2017-11-30 RX ORDER — CARVEDILOL 3.12 MG/1
TABLET ORAL
Qty: 60 TABLET | Refills: 3 | Status: SHIPPED | OUTPATIENT
Start: 2017-11-30 | End: 2018-04-06 | Stop reason: SDUPTHER

## 2017-11-30 RX ORDER — DIPHENHYDRAMINE HCL 25 MG
CAPSULE ORAL
Qty: 30 CAPSULE | Refills: 1 | Status: SHIPPED | OUTPATIENT
Start: 2017-11-30 | End: 2018-04-05 | Stop reason: SDUPTHER

## 2017-11-30 RX ORDER — ATORVASTATIN CALCIUM 80 MG/1
TABLET, FILM COATED ORAL
Qty: 30 TABLET | Refills: 3 | Status: SHIPPED | OUTPATIENT
Start: 2017-11-30 | End: 2018-04-06 | Stop reason: SDUPTHER

## 2017-12-26 DIAGNOSIS — F51.01 PRIMARY INSOMNIA: ICD-10-CM

## 2017-12-26 NOTE — TELEPHONE ENCOUNTER
hypertension     Chronic back pain     Narcotic abuse     Diabetes mellitus type 2, controlled ([address]     Generalized abdominal pain     SBO (small bowel obstruction)     Sigmoid stricture     Controlled type 2 diabetes mellitus with hyperglycemia, with long-term current use of insulin ([facility])     Hyperglycemia     MIKY (obstructive sleep apnea)     Atelectasis     Hypocalcemia     Vitamin D deficiency     Acute cystitis with hematuria     Hypokalemia     Colon obstruction

## 2017-12-27 RX ORDER — LANOLIN ALCOHOL/MO/W.PET/CERES
CREAM (GRAM) TOPICAL
Qty: 30 TABLET | Refills: 3 | Status: SHIPPED | OUTPATIENT
Start: 2017-12-27 | End: 2018-04-19

## 2018-01-03 DIAGNOSIS — E11.21 CONTROLLED TYPE 2 DIABETES MELLITUS WITH DIABETIC NEPHROPATHY, WITHOUT LONG-TERM CURRENT USE OF INSULIN (HCC): ICD-10-CM

## 2018-01-03 RX ORDER — ASPIRIN 81 MG
TABLET, DELAYED RELEASE (ENTERIC COATED) ORAL
Qty: 30 TABLET | Refills: 3 | Status: SHIPPED | OUTPATIENT
Start: 2018-01-03 | End: 2018-06-03 | Stop reason: SDUPTHER

## 2018-01-10 ENCOUNTER — HOSPITAL ENCOUNTER (OUTPATIENT)
Age: 58
Setting detail: SPECIMEN
Discharge: HOME | End: 2018-01-10
Payer: COMMERCIAL

## 2018-01-10 ENCOUNTER — OFFICE VISIT (OUTPATIENT)
Dept: OBGYN | Age: 58
End: 2018-01-10
Payer: COMMERCIAL

## 2018-01-10 VITALS
BODY MASS INDEX: 49.04 KG/M2 | HEIGHT: 63 IN | SYSTOLIC BLOOD PRESSURE: 135 MMHG | DIASTOLIC BLOOD PRESSURE: 81 MMHG | WEIGHT: 276.8 LBS | HEART RATE: 91 BPM

## 2018-01-10 DIAGNOSIS — N89.8 VAGINAL DISCHARGE: ICD-10-CM

## 2018-01-10 DIAGNOSIS — Z00.00 PREVENTATIVE HEALTH CARE: ICD-10-CM

## 2018-01-10 DIAGNOSIS — Z01.419 ENCOUNTER FOR WELL WOMAN EXAM WITH ROUTINE GYNECOLOGICAL EXAM: Primary | ICD-10-CM

## 2018-01-10 DIAGNOSIS — B37.31 YEAST INFECTION INVOLVING THE VAGINA AND SURROUNDING AREA: ICD-10-CM

## 2018-01-10 PROCEDURE — 99386 PREV VISIT NEW AGE 40-64: CPT | Performed by: OBSTETRICS & GYNECOLOGY

## 2018-01-10 RX ORDER — CLOTRIMAZOLE AND BETAMETHASONE DIPROPIONATE 10; .64 MG/G; MG/G
CREAM TOPICAL
Qty: 1 TUBE | Refills: 1 | Status: SHIPPED | OUTPATIENT
Start: 2018-01-10 | End: 2018-04-05 | Stop reason: SDUPTHER

## 2018-01-10 RX ORDER — FLUCONAZOLE 150 MG/1
150 TABLET ORAL ONCE
Qty: 2 TABLET | Refills: 0 | Status: SHIPPED | OUTPATIENT
Start: 2018-01-10 | End: 2018-01-10

## 2018-01-10 RX ORDER — NYSTATIN 100000 [USP'U]/G
POWDER TOPICAL
Qty: 1 BOTTLE | Refills: 2 | Status: SHIPPED | OUTPATIENT
Start: 2018-01-10 | End: 2018-08-28 | Stop reason: SDUPTHER

## 2018-01-10 NOTE — PROGRESS NOTES
History and Physical    Silviano Ochoa  1/10/2018              65 y.o. Chief Complaint   Patient presents with    Gynecologic Exam     NP        No LMP recorded. Patient is postmenopausal.             Primary Care Physician: Dina Salamanca MD    The patient was seen and examined. She has no chief complaint today and is here for her annual exam.  Her bowels are regular. There are no voiding complaints. She denies any bloating. She denies vaginal discharge and was counseled on STD's and the need for barrier contraception. HPI : Silviano Ochoa is a 65 y.o. female     Pt here for annual exam.  She is also complaining of vaginal discharge and itching, also has the itching under her abdomen. She has used nystatin powder and it did help - but she ran out of the nystatin. This itching/discharge has been present for months. Patient is very angry and is using fowl language during the entire visit - she seems very upset because she does not like the care she is getting from the clinics here, she states \"they don't do shit upstairs. \"  Most of the anger issues seem to stem from her long standing medical issues and requiring multiple bowel surgeries and now having a colostomy bag. Pt has been admitted in the past to the hospital for 2-3 months because of complications. Pt does have a support network at home, but they are not as active in her care as they used to be.   Patient has 7 children (5 sons and 2 daughters) and her daughters were helping her, but they are struggling with balancing their jobs, schooling and their own children.    ________________________________________________________________________  Obstetric History       T0      L0     SAB0   TAB0   Ectopic0   Molar0   Multiple0   Live Births0       # Outcome Date GA Lbr Yaya/2nd Weight Sex Delivery Anes PTL Lv   10             9             8             7 Para            6 Para            5 Para 4 Para            3 Para            2 Para            1 Para                 Past Medical History:   Diagnosis Date    Allergic rhinitis     Arthritis     LT. KNEE    Chronic back pain     Diverticulitis     GERD (gastroesophageal reflux disease)     Headache(784.0)     Hyperlipidemia     Hypertension     Knee pain     left knee pain    MDRO (multiple drug resistant organisms) resistance 2/24/2014    E. Coli urine    Narcotic abuse 7/28/2015    Obesity     Osteoarthritis     Rectovaginal fistula     Type II or unspecified type diabetes mellitus without mention of complication, not stated as uncontrolled     Urinary incontinence                                                                    Past Surgical History:   Procedure Laterality Date    ABDOMEN SURGERY  5/1/16    laprascopic ventral hernia repair with TRINH    ABDOMEN SURGERY  01/21/2014    Takedown of colovaginal fistula    ABDOMINAL EXPLORATION SURGERY  01/02/2017    mesh replacement, wound washout with wound vac application    KNEE ARTHROPLASTY      bilateral knees    VENTRAL HERNIA REPAIR  [id number]     Family History   Problem Relation Age of Onset    Diabetes Mother     Cancer Father      COLON, PROSTATE    Heart Disease Paternal Aunt      Social History     Social History    Marital status: Single     Spouse name: N/A    Number of children: N/A    Years of education: N/A     Occupational History    Not on file.      Social History Main Topics    Smoking status: Current Every Day Smoker     Packs/day: 0.50     Years: 30.00     Types: Cigarettes    Smokeless tobacco: Never Used    Alcohol use No    Drug use: No    Sexual activity: No     Other Topics Concern    Not on file     Social History Narrative    No narrative on file       MEDICATIONS:  Current Outpatient Prescriptions   Medication Sig Dispense Refill    Nebulizers ([facility]) MISC       fluconazole (DIFLUCAN) 150 MG tablet Take 1 tablet by mouth once for 1 dose 2 tablet 0    nystatin (MYCOSTATIN) 203511 UNIT/GM powder Apply 3 times daily. 1 Bottle 2    clotrimazole-betamethasone (LOTRISONE) 1-0.05 % cream Apply to the vaginal/rectal area bid externally x 4 days then daily for 3 days 1 Tube 1    ASPIRIN LOW DOSE 81 MG EC tablet TAKE ONE TABLET BY MOUTH DAILY 30 tablet 3    melatonin 3 MG TABS tablet TAKE ONE TABLET BY MOUTH AT BEDTIME AS DIRECTED 30 tablet 3    diphenhydrAMINE (BENADRYL) 25 MG capsule TAKE ONE CAPSULE BY MOUTH TWICE DAILY AS NEEDED FOR ITCHING 30 capsule 1    glipiZIDE (GLUCOTROL) 5 MG tablet TAKE ONE TABLET BY MOUTH TWICE DAILY 60 tablet 3    famotidine (PEPCID) 20 MG tablet TAKE ONE TABLET BY MOUTH TWICE DAILY 60 tablet 3    carvedilol (COREG) 3.125 MG tablet TAKE ONE TABLET BY MOUTH TWICE DAILY 60 tablet 3    atorvastatin (LIPITOR) 80 MG tablet TAKE ONE TABLET BY MOUTH ONCE DAILY 30 tablet 3    insulin glargine (BASAGLAR KWIKPEN) 100 UNIT/ML injection pen Inject 30 Units into the skin nightly 5 Pen 3    NICOTINE STEP 2 14 MG/24HR PLACE 1 PATCH ONTO THE SKIN DAILY 30 patch 5    Multiple Vitamins-Minerals (THERAPEUTIC MULTIVITAMIN-MINERALS) tablet Take 1 tablet by mouth daily 30 tablet 11    INSULIN SYRINGE .5CC/29G 29G X 1/2\" 0.5 ML MISC 1 each by Does not apply route daily 100 each 3    ULTICARE ALCOHOL SWABS 70 % PADS Use before insulin administration and checking blood sugars.  100 each 5    diazepam (VALIUM) 5 MG tablet       DOCQLACE 100 MG capsule       gabapentin (NEURONTIN) 600 MG tablet       TRUEPLUS INSULIN SYRINGE 31G X 5/16\" 0.5 ML MISC       nystatin (MYCOSTATIN) 822021 UNIT/GM powder       oxyCODONE HCl (OXY-IR) 10 MG immediate release tablet       oxyCODONE-acetaminophen (PERCOCET) 5-325 MG per tablet       sulfamethoxazole-trimethoprim (BACTRIM DS;SEPTRA DS) 800-160 MG per tablet       traMADol (ULTRAM) 50 MG tablet       EASY TOUCH PEN NEEDLES 32G X 6 MM MISC Use to take lantus daily 30 each 3  Alcohol Swabs (CURITY ALCOHOL PREPS) 70 % PADS USE AS DIRECTED (THREE TIMES DAILY) 30 each 5    TRUEPLUS LANCETS 28G MISC USE AS DIRECTED THREE TIMES DAILY 100 each 5    glucose blood VI test strips (TRUE METRIX BLOOD GLUCOSE TEST) strip USE AS DIRECTED TO TEST BLOOD SUGAR ONCE DAILY AS NEEDED AS DIRECTED 50 strip 5    metFORMIN (GLUCOPHAGE) 1000 MG tablet Take 1 tablet by mouth 2 times daily (with meals) TAKE 1 TABLET BY MOUTH DAILY WITH BREAKFAST 60 tablet 5    ipratropium-albuterol (DUONEB) 0.5-2.5 (3) MG/3ML SOLN nebulizer solution Inhale 3 mLs into the lungs 4 times daily as needed for Shortness of Breath 360 mL 5    ULTICARE INSULIN SYRINGE 31G X 5/16\" 1 ML MISC USE AS DIRECTED 30 each 5    triamcinolone (KENALOG) 0.025 % cream Apply topically 2 times daily. 1 Tube 1    vitamin D (ERGOCALCIFEROL) 18608 UNITS capsule Take 1 capsule by mouth once a week 10 capsule 0    Incontinence Supplies MISC Use as needed for incontinence 100 each 5    Alcohol Swabs (B-D SINGLE USE SWABS REGULAR) PADS USE AS DIRECTED 200 each 6    TRUEPLUS LANCETS 33G MISC USE AS DIRECTED THREE TIMES A  each 0    Respiratory Therapy Supplies (NEBULIZER COMPRESSOR) KIT 1 kit by Does not apply route once for 1 dose 1 kit 0     No current facility-administered medications for this visit. ALLERGIES:  Allergies as of 01/10/2018 - Review Complete 01/10/2018   Allergen Reaction Noted    Lisinopril Swelling 03/03/2015    Ace inhibitors Swelling 02/26/2015    Ciprofloxacin Hives 09/26/2013    Metaxalone  01/16/2017    Other Hives and Swelling 10/17/2013    Penicillins Hives and Swelling 05/24/2013       Symptoms of decreased mood absent  Symptoms of anhedonia absent      Immunization status: stated as current, but no records available. Gynecologic History:  Menarche: 8 yo  Menopause: pt was 41 when      No LMP recorded.  Patient is postmenopausal.    Sexually Active: No    STD History: No     Permanent Sterilization: No   Reversible Birth Control: No        Hormone Replacement Exposure: No      Genetic Qualified Family History of Breast, Ovarian , Colon or Uterine Cancer: Yes - maternal great aunt had breast cancer and pt's mother had breast cancer last year (over 54yo of age at diagnosis)     If YES see scanned worksheet. Preventative Health Testing:    Health Maintenance:  Health Maintenance Due   Topic Date Due    Pneumococcal med risk (1 of 1 - PPSV23) 12/26/1980    Colon cancer screen colonoscopy  12/26/2011    Lipid screen  05/27/2016    Diabetic retinal exam  06/06/2017    Flu vaccine (1) 09/01/2017    Diabetic foot exam  09/06/2017       Date of Last Pap Smear: long time ago pt states  Abnormal Pap Smear History: denies  Colposcopy History: denies  Date of Last Mammogram: 6/27/2017 - birads 1  Date of Last Colonoscopy: pt has had a colonoscopy - she has had multiple bowl surgeries  Date of Last Bone Density: n/a      ________________________________________________________________________      REVIEW OF SYSTEMS:       A minimum of an eleven point review of systems was completed. Review Of Systems (11 point):  Constitutional: No fever, chills or malaise; No weight change or fatigue  Head and Eyes: No vision, Headache, Dizziness or trauma in last 12 months  ENT ROS: No hearing, Tinnitis, sinus or taste problems  Hematological and Lymphatic ROS:No Lymphoma, Von Willebrand's, Hemophillia or Bleeding History  Psych ROS: No Depression, Homicidal thoughts,suicidal thoughts, or anxiety  Breast ROS: No prior breast abnormalities or lumps  Respiratory ROS: No SOB, Pneumoniae,Cough, or Pulmonary Embolism History  Cardiovascular ROS: No Chest Pain with Exertion, Palpitations, Syncope, Edema, Arrhythmia  Gastrointestinal ROS: No Indigestion, Heartburn, Nausea, vomiting, Diarrhea, Constipation,or Bowel Changes;  No Bloody Stools or melena  Genito-Urinary ROS:+vaginal discharge/itching No Dysuria, Hematuria or

## 2018-01-11 LAB
C TRACH DNA GENITAL QL NAA+PROBE: NEGATIVE
DIRECT EXAM: ABNORMAL
Lab: ABNORMAL
N. GONORRHOEAE DNA: NEGATIVE
SPECIMEN DESCRIPTION: ABNORMAL
STATUS: ABNORMAL

## 2018-01-11 RX ORDER — FLUCONAZOLE 150 MG/1
150 TABLET ORAL ONCE
Qty: 1 TABLET | Refills: 1 | Status: SHIPPED | OUTPATIENT
Start: 2018-01-11 | End: 2018-01-11

## 2018-01-13 LAB
HPV SAMPLE: NORMAL
HPV SOURCE: NORMAL
HPV, GENOTYPE 16: NOT DETECTED
HPV, GENOTYPE 18: NOT DETECTED
HPV, HIGH RISK OTHER: NOT DETECTED
HPV, INTERPRETATION: NORMAL

## 2018-01-19 LAB — CYTOLOGY REPORT: NORMAL

## 2018-02-15 ENCOUNTER — APPOINTMENT (OUTPATIENT)
Dept: CT IMAGING | Age: 58
End: 2018-02-15
Payer: COMMERCIAL

## 2018-02-15 ENCOUNTER — HOSPITAL ENCOUNTER (EMERGENCY)
Age: 58
Discharge: HOME | End: 2018-02-15
Attending: EMERGENCY MEDICINE
Payer: COMMERCIAL

## 2018-02-15 VITALS
OXYGEN SATURATION: 97 % | TEMPERATURE: 98.2 F | WEIGHT: 270 LBS | RESPIRATION RATE: 18 BRPM | SYSTOLIC BLOOD PRESSURE: 146 MMHG | HEART RATE: 107 BPM | HEIGHT: 63 IN | DIASTOLIC BLOOD PRESSURE: 81 MMHG | BODY MASS INDEX: 47.84 KG/M2

## 2018-02-15 DIAGNOSIS — K57.92 DIVERTICULITIS OF INTESTINE, UNSPECIFIED BLEEDING STATUS, UNSPECIFIED COMPLICATION STATUS, UNSPECIFIED PART OF INTESTINAL TRACT: Primary | ICD-10-CM

## 2018-02-15 LAB
-: ABNORMAL
ABSOLUTE EOS #: 0.16 K/UL (ref 0–0.44)
ABSOLUTE IMMATURE GRANULOCYTE: 0.04 K/UL (ref 0–0.3)
ABSOLUTE LYMPH #: 2.46 K/UL (ref 1.1–3.7)
ABSOLUTE MONO #: 0.94 K/UL (ref 0.1–1.2)
ALBUMIN SERPL-MCNC: 3.3 G/DL (ref 3.5–5.2)
ALBUMIN/GLOBULIN RATIO: 0.7 (ref 1–2.5)
ALP BLD-CCNC: 209 U/L (ref 35–104)
ALT SERPL-CCNC: 61 U/L (ref 5–33)
AMORPHOUS: ABNORMAL
ANION GAP SERPL CALCULATED.3IONS-SCNC: 13 MMOL/L (ref 9–17)
AST SERPL-CCNC: 69 U/L
BACTERIA: ABNORMAL
BASOPHILS # BLD: 0 % (ref 0–2)
BASOPHILS ABSOLUTE: 0.05 K/UL (ref 0–0.2)
BILIRUB SERPL-MCNC: 0.22 MG/DL (ref 0.3–1.2)
BILIRUBIN URINE: NEGATIVE
BUN BLDV-MCNC: 5 MG/DL (ref 6–20)
BUN/CREAT BLD: ABNORMAL (ref 9–20)
CALCIUM SERPL-MCNC: 8.7 MG/DL (ref 8.6–10.4)
CASTS UA: ABNORMAL /LPF (ref 0–8)
CHLORIDE BLD-SCNC: 96 MMOL/L (ref 98–107)
CO2: 24 MMOL/L (ref 20–31)
COLOR: YELLOW
COMMENT UA: ABNORMAL
CREAT SERPL-MCNC: 0.59 MG/DL (ref 0.5–0.9)
CRYSTALS, UA: ABNORMAL /HPF
DIFFERENTIAL TYPE: ABNORMAL
EOSINOPHILS RELATIVE PERCENT: 1 % (ref 1–4)
EPITHELIAL CELLS UA: ABNORMAL /HPF (ref 0–5)
GFR AFRICAN AMERICAN: >60 ML/MIN
GFR NON-AFRICAN AMERICAN: >60 ML/MIN
GFR SERPL CREATININE-BSD FRML MDRD: ABNORMAL ML/MIN/{1.73_M2}
GFR SERPL CREATININE-BSD FRML MDRD: ABNORMAL ML/MIN/{1.73_M2}
GLUCOSE BLD-MCNC: 256 MG/DL (ref 70–99)
GLUCOSE URINE: ABNORMAL
HCT VFR BLD CALC: 35.4 % (ref 36.3–47.1)
HEMOGLOBIN: 11 G/DL (ref 11.9–15.1)
IMMATURE GRANULOCYTES: 0 %
KETONES, URINE: NEGATIVE
LACTIC ACID, WHOLE BLOOD: 1.8 MMOL/L (ref 0.7–2.1)
LEUKOCYTE ESTERASE, URINE: NEGATIVE
LIPASE: 17 U/L (ref 13–60)
LYMPHOCYTES # BLD: 21 % (ref 24–43)
MCH RBC QN AUTO: 26.9 PG (ref 25.2–33.5)
MCHC RBC AUTO-ENTMCNC: 31.1 G/DL (ref 28.4–34.8)
MCV RBC AUTO: 86.6 FL (ref 82.6–102.9)
MONOCYTES # BLD: 8 % (ref 3–12)
MUCUS: ABNORMAL
NITRITE, URINE: NEGATIVE
NRBC AUTOMATED: 0 PER 100 WBC
OTHER OBSERVATIONS UA: ABNORMAL
PDW BLD-RTO: 13.7 % (ref 11.8–14.4)
PH UA: 6 (ref 5–8)
PLATELET # BLD: 348 K/UL (ref 138–453)
PLATELET ESTIMATE: ABNORMAL
PMV BLD AUTO: 11.3 FL (ref 8.1–13.5)
POTASSIUM SERPL-SCNC: 3.6 MMOL/L (ref 3.7–5.3)
PROTEIN UA: ABNORMAL
RBC # BLD: 4.09 M/UL (ref 3.95–5.11)
RBC # BLD: ABNORMAL 10*6/UL
RBC UA: ABNORMAL /HPF (ref 0–4)
RENAL EPITHELIAL, UA: ABNORMAL /HPF
SEG NEUTROPHILS: 70 % (ref 36–65)
SEGMENTED NEUTROPHILS ABSOLUTE COUNT: 7.86 K/UL (ref 1.5–8.1)
SODIUM BLD-SCNC: 133 MMOL/L (ref 135–144)
SPECIFIC GRAVITY UA: 1.02 (ref 1–1.03)
TOTAL PROTEIN: 8 G/DL (ref 6.4–8.3)
TRICHOMONAS: ABNORMAL
TURBIDITY: ABNORMAL
URINE HGB: NEGATIVE
UROBILINOGEN, URINE: NORMAL
WBC # BLD: 11.5 K/UL (ref 3.5–11.3)
WBC # BLD: ABNORMAL 10*3/UL
WBC UA: ABNORMAL /HPF (ref 0–5)
YEAST: ABNORMAL

## 2018-02-15 PROCEDURE — 74177 CT ABD & PELVIS W/CONTRAST: CPT

## 2018-02-15 PROCEDURE — 6360000004 HC RX CONTRAST MEDICATION: Performed by: STUDENT IN AN ORGANIZED HEALTH CARE EDUCATION/TRAINING PROGRAM

## 2018-02-15 PROCEDURE — 6360000002 HC RX W HCPCS: Performed by: EMERGENCY MEDICINE

## 2018-02-15 PROCEDURE — 80053 COMPREHEN METABOLIC PANEL: CPT

## 2018-02-15 PROCEDURE — 81001 URINALYSIS AUTO W/SCOPE: CPT

## 2018-02-15 PROCEDURE — 96376 TX/PRO/DX INJ SAME DRUG ADON: CPT

## 2018-02-15 PROCEDURE — 96374 THER/PROPH/DIAG INJ IV PUSH: CPT

## 2018-02-15 PROCEDURE — 87086 URINE CULTURE/COLONY COUNT: CPT

## 2018-02-15 PROCEDURE — 83605 ASSAY OF LACTIC ACID: CPT

## 2018-02-15 PROCEDURE — 6370000000 HC RX 637 (ALT 250 FOR IP): Performed by: STUDENT IN AN ORGANIZED HEALTH CARE EDUCATION/TRAINING PROGRAM

## 2018-02-15 PROCEDURE — 99284 EMERGENCY DEPT VISIT MOD MDM: CPT

## 2018-02-15 PROCEDURE — 2580000003 HC RX 258: Performed by: STUDENT IN AN ORGANIZED HEALTH CARE EDUCATION/TRAINING PROGRAM

## 2018-02-15 PROCEDURE — 74176 CT ABD & PELVIS W/O CONTRAST: CPT

## 2018-02-15 PROCEDURE — 6360000002 HC RX W HCPCS: Performed by: STUDENT IN AN ORGANIZED HEALTH CARE EDUCATION/TRAINING PROGRAM

## 2018-02-15 PROCEDURE — 83690 ASSAY OF LIPASE: CPT

## 2018-02-15 PROCEDURE — 85025 COMPLETE CBC W/AUTO DIFF WBC: CPT

## 2018-02-15 RX ORDER — SULFAMETHOXAZOLE AND TRIMETHOPRIM 800; 160 MG/1; MG/1
1 TABLET ORAL ONCE
Status: COMPLETED | OUTPATIENT
Start: 2018-02-15 | End: 2018-02-15

## 2018-02-15 RX ORDER — METRONIDAZOLE 500 MG/1
500 TABLET ORAL 3 TIMES DAILY
Qty: 30 TABLET | Refills: 0 | Status: SHIPPED | OUTPATIENT
Start: 2018-02-15 | End: 2018-02-25

## 2018-02-15 RX ORDER — 0.9 % SODIUM CHLORIDE 0.9 %
500 INTRAVENOUS SOLUTION INTRAVENOUS ONCE
Status: COMPLETED | OUTPATIENT
Start: 2018-02-15 | End: 2018-02-15

## 2018-02-15 RX ORDER — OXYCODONE HYDROCHLORIDE AND ACETAMINOPHEN 5; 325 MG/1; MG/1
1 TABLET ORAL EVERY 6 HOURS PRN
Qty: 8 TABLET | Refills: 0 | Status: SHIPPED | OUTPATIENT
Start: 2018-02-15 | End: 2018-02-22

## 2018-02-15 RX ORDER — MORPHINE SULFATE 4 MG/ML
4 INJECTION, SOLUTION INTRAMUSCULAR; INTRAVENOUS ONCE
Status: COMPLETED | OUTPATIENT
Start: 2018-02-15 | End: 2018-02-15

## 2018-02-15 RX ORDER — MORPHINE SULFATE 4 MG/ML
2 INJECTION, SOLUTION INTRAMUSCULAR; INTRAVENOUS ONCE
Status: COMPLETED | OUTPATIENT
Start: 2018-02-15 | End: 2018-02-15

## 2018-02-15 RX ORDER — ONDANSETRON 4 MG/1
4 TABLET, FILM COATED ORAL EVERY 8 HOURS PRN
Qty: 8 TABLET | Refills: 0 | Status: SHIPPED | OUTPATIENT
Start: 2018-02-15 | End: 2019-01-11 | Stop reason: ALTCHOICE

## 2018-02-15 RX ORDER — SULFAMETHOXAZOLE AND TRIMETHOPRIM 800; 160 MG/1; MG/1
1 TABLET ORAL 2 TIMES DAILY
Qty: 20 TABLET | Refills: 0 | Status: SHIPPED | OUTPATIENT
Start: 2018-02-15 | End: 2018-02-25

## 2018-02-15 RX ORDER — METRONIDAZOLE 500 MG/1
500 TABLET ORAL ONCE
Status: COMPLETED | OUTPATIENT
Start: 2018-02-15 | End: 2018-02-15

## 2018-02-15 RX ORDER — OXYCODONE HYDROCHLORIDE AND ACETAMINOPHEN 5; 325 MG/1; MG/1
1 TABLET ORAL ONCE
Status: COMPLETED | OUTPATIENT
Start: 2018-02-15 | End: 2018-02-15

## 2018-02-15 RX ADMIN — METRONIDAZOLE 500 MG: 500 TABLET ORAL at 20:02

## 2018-02-15 RX ADMIN — SODIUM CHLORIDE 500 ML: 9 INJECTION, SOLUTION INTRAVENOUS at 14:17

## 2018-02-15 RX ADMIN — SULFAMETHOXAZOLE AND TRIMETHOPRIM 1 TABLET: 800; 160 TABLET ORAL at 20:02

## 2018-02-15 RX ADMIN — OXYCODONE HYDROCHLORIDE AND ACETAMINOPHEN 1 TABLET: 5; 325 TABLET ORAL at 20:02

## 2018-02-15 RX ADMIN — MORPHINE SULFATE 2 MG: 4 INJECTION INTRAVENOUS at 14:17

## 2018-02-15 RX ADMIN — MORPHINE SULFATE 4 MG: 4 INJECTION INTRAVENOUS at 16:35

## 2018-02-15 RX ADMIN — MORPHINE SULFATE 2 MG: 4 INJECTION INTRAVENOUS at 13:37

## 2018-02-15 RX ADMIN — IOPAMIDOL 75 ML: 755 INJECTION, SOLUTION INTRAVENOUS at 17:22

## 2018-02-15 ASSESSMENT — PAIN DESCRIPTION - DESCRIPTORS: DESCRIPTORS: DISCOMFORT;SHOOTING;SHARP

## 2018-02-15 ASSESSMENT — ENCOUNTER SYMPTOMS
ABDOMINAL PAIN: 1
BLOOD IN STOOL: 0
NAUSEA: 1
VOMITING: 1
SHORTNESS OF BREATH: 0
ABDOMINAL DISTENTION: 0

## 2018-02-15 ASSESSMENT — PAIN DESCRIPTION - LOCATION: LOCATION: ABDOMEN

## 2018-02-15 ASSESSMENT — PAIN SCALES - GENERAL
PAINLEVEL_OUTOF10: 10
PAINLEVEL_OUTOF10: 10
PAINLEVEL_OUTOF10: 8
PAINLEVEL_OUTOF10: 10
PAINLEVEL_OUTOF10: 8

## 2018-02-15 ASSESSMENT — PAIN DESCRIPTION - ORIENTATION: ORIENTATION: LEFT

## 2018-02-15 NOTE — ED NOTES
Pt ambulatory to room 40 with c/o left sided abdominal pain. Pt reports that she has hx of diverticulitis and states that she is worried that it might be a flare up. Pt reports that the pain has been on going for about 3 days now. Pt reports that she is worried about eating things because it may make the pain worse. Pt alert and oriented x4, talking in complete sentences, RR even and unlabored. Pt acting age appropriate. Will continue to monitor.        Kt Martin RN  02/15/18 [id number]

## 2018-02-15 NOTE — ED PROVIDER NOTES
[facility]  ED  Emergency Department Encounter  Emergency Medicine Resident     Pt Name: Johnathan Wyatt  MRN: [id number]  [place] 1960  Date of evaluation: 2/15/18  PCP:  Tevin Donis MD    CHIEF COMPLAINT       Chief Complaint   Patient presents with    Flank Pain       HISTORY OF PRESENT ILLNESS  (Location/Symptom, Timing/Onset, Context/Setting, Quality, Duration, Modifying Factors, Severity.)      Johnathan Wyatt is a 65 y.o. female who presents with Sharp Left lower quadrant abdominal pain for 3 days. She admits to one episode of nausea and vomiting however she states that she has been able to tolerate oral intake since that time. She denies any hematuria, dysuria, or frequency. She has a past medical history of small bowel obstruction, diverticulitis, and type 2 diabetes. She has had multiple prior surgeries including hernia repair and colostomy placement. She states that her colostomy has been working normally since her symptoms began. She has no other complaints at this time. PAST MEDICAL / SURGICAL / SOCIAL / FAMILY HISTORY      has a past medical history of Allergic rhinitis; Arthritis; Chronic back pain; Diverticulitis; GERD (gastroesophageal reflux disease); Headache(784.0); Hyperlipidemia; Hypertension; Knee pain; MDRO (multiple drug resistant organisms) resistance; Narcotic abuse; Obesity; Osteoarthritis; Rectovaginal fistula; Type II or unspecified type diabetes mellitus without mention of complication, not stated as uncontrolled; and Urinary incontinence. has a past surgical history that includes Knee Arthroplasty; Abdomen surgery (5/1/16); ventral hernia repair (551621); Abdomen surgery (01/21/2014); and Abdominal exploration surgery (01/02/2017). Social History     Social History    Marital status: Single     Spouse name: N/A    Number of children: N/A    Years of education: N/A     Occupational History    Not on file.      Social History Main Topics ITCHING 11/30/17   Dilip Asif MD   glipiZIDE (GLUCOTROL) 5 MG tablet TAKE ONE TABLET BY MOUTH TWICE DAILY 11/30/17   Dilip Asif MD   famotidine (PEPCID) 20 MG tablet TAKE ONE TABLET BY MOUTH TWICE DAILY 11/30/17   Diilp Asif MD   carvedilol (COREG) 3.125 MG tablet TAKE ONE TABLET BY MOUTH TWICE DAILY 11/30/17   Dilip Asif MD   atorvastatin (LIPITOR) 80 MG tablet TAKE ONE TABLET BY MOUTH ONCE DAILY 11/30/17   Martinez Maya MD   insulin glargine (BASAGLAR KWIKPEN) 100 UNIT/ML injection pen Inject 30 Units into the skin nightly 11/6/17   Dilip Asif MD   NICOTINE STEP 2 14 MG/24HR PLACE 1 PATCH ONTO THE SKIN DAILY 10/31/17   Martinez Maya MD   Multiple Vitamins-Minerals (THERAPEUTIC MULTIVITAMIN-MINERALS) tablet Take 1 tablet by mouth daily 10/27/17 10/27/18  Angela Cortez MD   INSULIN SYRINGE .5CC/29G 29G X 1/2\" 0.5 ML MISC 1 each by Does not apply route daily 10/9/17   Dilip Asif MD   ULTICARE ALCOHOL SWABS 70 % PADS Use before insulin administration and checking blood sugars.  10/9/17   Martinez Maya MD   Respiratory Therapy Supplies (NEBULIZER COMPRESSOR) KIT 1 kit by Does not apply route once for 1 dose 10/9/17 10/9/17  Dilip Asif MD   diazepam (VALIUM) 5 MG tablet  7/12/17   Historical Provider, MD   DOCQLACE 100 MG capsule  9/12/17   Historical Provider, MD   gabapentin (NEURONTIN) 600 MG tablet  9/20/17   Historical Provider, MD   [address] X 5/16\" 0.5 ML MISC  8/24/17   Historical Provider, MD   nystatin (MYCOSTATIN) 747248 UNIT/GM powder  9/28/17   Historical Provider, MD   oxyCODONE HCl (OXY-IR) 10 MG immediate release tablet  7/12/17   Historical Provider, MD   traMADol [address]) 50 MG tablet  9/28/17   Historical Provider, MD   EASY TOUCH PEN NEEDLES 32G X 6 MM MISC Use to take lantus daily 10/5/17   Angela Cortez MD   Alcohol Swabs (CURITY ALCOHOL PREPS) 70 % PADS USE AS DIRECTED (THREE TIMES DAILY) 10/5/17 Roger Roberto MD   TRUEPLUS LANCETS 28G MISC USE AS DIRECTED THREE TIMES DAILY 10/5/17   Roger Roberto MD   glucose blood VI test strips (TRUE METRIX BLOOD GLUCOSE TEST) strip USE AS DIRECTED TO TEST BLOOD SUGAR ONCE DAILY AS NEEDED AS DIRECTED 10/5/17   Roger Roberto MD   metFORMIN (GLUCOPHAGE) 1000 MG tablet Take 1 tablet by mouth 2 times daily (with meals) TAKE 1 TABLET BY MOUTH DAILY WITH BREAKFAST 10/5/17   Roger Roberto MD   ipratropium-albuterol (DUONEB) 0.5-2.5 (3) MG/3ML SOLN nebulizer solution Inhale 3 mLs into the lungs 4 times daily as needed for Shortness of Breath 5/22/17   Dominik Rodgesr MD   Barnetta Rast INSULIN SYRINGE 31G X 5/16\" 1 ML MISC USE AS DIRECTED 4/30/17   Dilip Asif MD   triamcinolone (KENALOG) 0.025 % cream Apply topically 2 times daily. 3/17/17   Roger Roberto MD   vitamin D (ERGOCALCIFEROL) 82485 UNITS capsule Take 1 capsule by mouth once a week 1/4/17   Lydia Byrd MD   Incontinence Supplies MISC Use as needed for incontinence 9/23/16   Dilip Asif MD   Alcohol Swabs (B-D SINGLE USE SWABS REGULAR) PADS USE AS DIRECTED 9/8/16   Dominik Rodgers MD   TRUEPLUS LANCETS 33G MISC USE AS DIRECTED THREE TIMES A DAY 9/6/16   Dilip Asif MD       REVIEW OF SYSTEMS    (2-9 systems for level 4, 10 or more for level 5)      Review of Systems   Constitutional: Positive for chills. Negative for fever. Respiratory: Negative for shortness of breath. Cardiovascular: Negative for chest pain. Gastrointestinal: Positive for abdominal pain, nausea and vomiting. Negative for abdominal distention and blood in stool. Genitourinary: Negative for dysuria, frequency and hematuria. Musculoskeletal: Negative for myalgias. Skin: Negative for rash and wound.        PHYSICAL EXAM   (up to 7 for level 4, 8 or more for level 5)      INITIAL VITALS:   BP (!) 146/81   Pulse 107   Temp 98.2 °F (36.8 °C) (Oral)   Resp 18   Ht 5' 3\" (1.6 m)   Wt 270 lb sulfamethoxazole-trimethoprim (BACTRIM DS) 800-160 MG per tablet     Sig: Take 1 tablet by mouth 2 times daily for 10 days     Dispense:  20 tablet     Refill:  0    metroNIDAZOLE (FLAGYL) 500 MG tablet     Sig: Take 1 tablet by mouth 3 times daily for 10 days     Dispense:  30 tablet     Refill:  0    sulfamethoxazole-trimethoprim (BACTRIM DS;SEPTRA DS) 800-160 MG per tablet 1 tablet    metroNIDAZOLE (FLAGYL) tablet 500 mg    oxyCODONE-acetaminophen (PERCOCET) 5-325 MG per tablet 1 tablet       DDX: Kidney stone, diverticulitis, bowel obstruction, appendicitis, colostomy complication  DIAGNOSTIC RESULTS / EMERGENCY DEPARTMENT COURSE / MDM     LABS:  Results for orders placed or performed during the hospital encounter of 02/15/18   CBC Auto Differential   Result Value Ref Range    WBC 11.5 (H) 3.5 - 11.3 k/uL    RBC 4.09 3.95 - 5.11 m/uL    Hemoglobin 11.0 (L) 11.9 - 15.1 g/dL    Hematocrit 35.4 (L) 36.3 - 47.1 %    MCV 86.6 82.6 - 102.9 fL    MCH 26.9 25.2 - 33.5 pg    MCHC 31.1 28.4 - 34.8 g/dL    RDW 13.7 11.8 - 14.4 %    Platelets 251 019 - 139 k/uL    MPV 11.3 8.1 - 13.5 fL    NRBC Automated 0.0 0.0 per 100 WBC    Differential Type NOT REPORTED     Seg Neutrophils 70 (H) 36 - 65 %    Lymphocytes 21 (L) 24 - 43 %    Monocytes 8 3 - 12 %    Eosinophils % 1 1 - 4 %    Basophils 0 0 - 2 %    Immature Granulocytes 0 0 %    Segs Absolute 7.86 1.50 - 8.10 k/uL    Absolute Lymph # 2.46 1.10 - 3.70 k/uL    Absolute Mono # 0.94 0.10 - 1.20 k/uL    Absolute Eos # 0.16 0.00 - 0.44 k/uL    Basophils # 0.05 0.00 - 0.20 k/uL    Absolute Immature Granulocyte 0.04 0.00 - 0.30 k/uL    WBC Morphology NOT REPORTED     RBC Morphology NOT REPORTED     Platelet Estimate NOT REPORTED    Comprehensive Metabolic Panel   Result Value Ref Range    Glucose 256 (H) 70 - 99 mg/dL    BUN 5 (L) 6 - 20 mg/dL    CREATININE 0.59 0.50 - 0.90 mg/dL    Bun/Cre Ratio NOT REPORTED 9 - 20    Calcium 8.7 8.6 - 10.4 mg/dL    Sodium 133 (L) 135 - 144 history perforated diverticulitis with an abscess could be considered. Malignancy is not excluded. EKG  None    All EKG's are interpreted by the Emergency Department Physician who either signs or Co-signs this chart in the absence of a cardiologist.    EMERGENCY DEPARTMENT COURSE:  We'll plan for abdominal labs and CT. CT was concerning for inflammatory process in the left lower abdomen, Radiology recommended follow-up CT with IV and oral contrast.  CT with IV contrast failed to improve visualization. Patient is adamant she does not want to be admitted to the hospital.  Gen. surgery consult evaluated the patient in the emergency Department, however was unable to contact Dr. Pretty Fischer. Patient states that she would like to go home and call to follow-up with Dr. Pretty Fischer tomorrow. She has been stable throughout her emergency department course, is afebrile, non-peritoneal, pain has been well controlled. Plan for discharge with Bactrim and Flagyl for suspected diverticulitis due to history of ciprofloxacin allergy. Antibiotics were started in the emergency department. She is in agreement with the decision to be discharged. PROCEDURES:  None    CONSULTS:  IP CONSULT TO GENERAL SURGERY    CRITICAL CARE:  None    FINAL IMPRESSION      1.  Diverticulitis of intestine, unspecified bleeding status, unspecified complication status, unspecified part of intestinal tract          DISPOSITION / PLAN     DISPOSITION        PATIENT REFERRED TO:  Josse Cornell MD  [address] [place] [address] [address]  [address] [place], [place] [id number]    As needed, If symptoms worsen      DISCHARGE MEDICATIONS:  Discharge Medication List as of 2/15/2018  7:57 PM      START taking these medications    Details   ondansetron (ZOFRAN) 4 MG tablet Take 1 tablet by mouth every 8 hours as needed for Nausea or Vomiting, Disp-8 tablet, R-0Print      metroNIDAZOLE (FLAGYL) 500 MG

## 2018-02-16 LAB
CULTURE: NORMAL
CULTURE: NORMAL
Lab: NORMAL
SPECIMEN DESCRIPTION: NORMAL
STATUS: NORMAL

## 2018-02-16 NOTE — CONSULTS
bilaterally  CARDIOVASCULAR:  regular rate and rhythm and No Murmur  ABDOMEN: soft, scars consistent w/ extensive surgical history. non distended +TTP LLQ.  + voluntary guarding guarding present,  No peritoneal signs  NEUROLOGIC:  Mental Status Exam:  Level of Alertness:   awake  Orientation:   oriented to person, place, time    CBC:   Lab Results   Component Value Date    WBC 11.5 02/15/2018    RBC 4.09 02/15/2018    HGB 11.0 02/15/2018    HCT 35.4 02/15/2018    MCV 86.6 02/15/2018    MCH 26.9 02/15/2018    MCHC 31.1 02/15/2018    RDW 13.7 02/15/2018     02/15/2018    MPV 11.3 02/15/2018     BMP:    Lab Results   Component Value Date     02/15/2018    K 3.6 02/15/2018    CL 96 02/15/2018    CO2 24 02/15/2018    BUN 5 02/15/2018    LABALBU 3.3 02/15/2018    CREATININE 0.59 02/15/2018    CALCIUM 8.7 02/15/2018    GFRAA >60 02/15/2018    LABGLOM >60 02/15/2018    GLUCOSE 256 02/15/2018       Pertinent Radiology:   2/15/2018 CT w/ Contrast  Left lower quadrant inflammatory masslike area is again seen.  Incomplete   oral opacification limits evaluation of loops of bowel within this region. Given the presence of a low density area within this and patient history   perforated diverticulitis with an abscess could be considered.  Malignancy is   not excluded.        ASSESSMENT   Patient Active Problem List   Diagnosis    Diverticulitis of large intestine with abscess without bleeding    Leukocytosis    Morbid obesity with BMI of 40.0-44.9, adult ([address]    Wound, open, abdominal wall, anterior    Incisional hernia, without obstruction or gangrene    Pulmonary embolism ([address]    Essential hypertension    Chronic back pain    Narcotic abuse    Diabetes mellitus type 2, controlled ([address]    Generalized abdominal pain    SBO (small bowel obstruction)    Sigmoid stricture    Controlled type 2 diabetes mellitus with hyperglycemia, with long-term current use of insulin (HCC)    Hyperglycemia    MIKY

## 2018-04-05 ENCOUNTER — OFFICE VISIT (OUTPATIENT)
Dept: INTERNAL MEDICINE | Age: 58
End: 2018-04-05
Payer: COMMERCIAL

## 2018-04-05 VITALS
BODY MASS INDEX: 49.08 KG/M2 | HEART RATE: 84 BPM | WEIGHT: 277 LBS | HEIGHT: 63 IN | DIASTOLIC BLOOD PRESSURE: 66 MMHG | SYSTOLIC BLOOD PRESSURE: 120 MMHG

## 2018-04-05 DIAGNOSIS — E11.8 DM (DIABETES MELLITUS) WITH COMPLICATIONS (HCC): ICD-10-CM

## 2018-04-05 DIAGNOSIS — M15.9 PRIMARY OSTEOARTHRITIS INVOLVING MULTIPLE JOINTS: ICD-10-CM

## 2018-04-05 DIAGNOSIS — Z79.4 UNCONTROLLED TYPE 2 DIABETES MELLITUS WITH HYPERGLYCEMIA, WITH LONG-TERM CURRENT USE OF INSULIN (HCC): Primary | ICD-10-CM

## 2018-04-05 DIAGNOSIS — B37.31 YEAST INFECTION INVOLVING THE VAGINA AND SURROUNDING AREA: ICD-10-CM

## 2018-04-05 DIAGNOSIS — Z13.31 POSITIVE DEPRESSION SCREENING: ICD-10-CM

## 2018-04-05 DIAGNOSIS — R21 RASH AND NONSPECIFIC SKIN ERUPTION: ICD-10-CM

## 2018-04-05 DIAGNOSIS — Z11.4 ENCOUNTER FOR SCREENING FOR HIV: ICD-10-CM

## 2018-04-05 DIAGNOSIS — E11.65 UNCONTROLLED TYPE 2 DIABETES MELLITUS WITH HYPERGLYCEMIA, WITH LONG-TERM CURRENT USE OF INSULIN (HCC): Primary | ICD-10-CM

## 2018-04-05 DIAGNOSIS — L23.9 ALLERGIC DERMATITIS: ICD-10-CM

## 2018-04-05 DIAGNOSIS — Z12.11 COLON CANCER SCREENING: ICD-10-CM

## 2018-04-05 LAB — HBA1C MFR BLD: 12.1 %

## 2018-04-05 PROCEDURE — G8427 DOCREV CUR MEDS BY ELIG CLIN: HCPCS | Performed by: INTERNAL MEDICINE

## 2018-04-05 PROCEDURE — 4004F PT TOBACCO SCREEN RCVD TLK: CPT | Performed by: INTERNAL MEDICINE

## 2018-04-05 PROCEDURE — 99213 OFFICE O/P EST LOW 20 MIN: CPT | Performed by: INTERNAL MEDICINE

## 2018-04-05 PROCEDURE — 3046F HEMOGLOBIN A1C LEVEL >9.0%: CPT | Performed by: INTERNAL MEDICINE

## 2018-04-05 PROCEDURE — 83036 HEMOGLOBIN GLYCOSYLATED A1C: CPT | Performed by: INTERNAL MEDICINE

## 2018-04-05 PROCEDURE — G8431 POS CLIN DEPRES SCRN F/U DOC: HCPCS | Performed by: INTERNAL MEDICINE

## 2018-04-05 PROCEDURE — 3017F COLORECTAL CA SCREEN DOC REV: CPT | Performed by: INTERNAL MEDICINE

## 2018-04-05 PROCEDURE — G8417 CALC BMI ABV UP PARAM F/U: HCPCS | Performed by: INTERNAL MEDICINE

## 2018-04-05 PROCEDURE — 99214 OFFICE O/P EST MOD 30 MIN: CPT

## 2018-04-05 PROCEDURE — 99213 OFFICE O/P EST LOW 20 MIN: CPT

## 2018-04-05 PROCEDURE — 3014F SCREEN MAMMO DOC REV: CPT | Performed by: INTERNAL MEDICINE

## 2018-04-05 RX ORDER — TRAMADOL HYDROCHLORIDE 50 MG/1
50 TABLET ORAL DAILY PRN
Qty: 30 TABLET | Refills: 3 | Status: SHIPPED | OUTPATIENT
Start: 2018-04-05 | End: 2018-05-03 | Stop reason: SDUPTHER

## 2018-04-05 RX ORDER — TRIAMCINOLONE ACETONIDE 0.25 MG/G
CREAM TOPICAL
Qty: 1 TUBE | Refills: 1 | Status: SHIPPED | OUTPATIENT
Start: 2018-04-05 | End: 2018-06-06 | Stop reason: SDUPTHER

## 2018-04-05 RX ORDER — DIPHENHYDRAMINE HCL 25 MG
CAPSULE ORAL
Qty: 30 CAPSULE | Refills: 1 | Status: SHIPPED | OUTPATIENT
Start: 2018-04-05 | End: 2018-04-19

## 2018-04-05 RX ORDER — CLOTRIMAZOLE AND BETAMETHASONE DIPROPIONATE 10; .64 MG/G; MG/G
CREAM TOPICAL
Qty: 1 TUBE | Refills: 1 | Status: SHIPPED | OUTPATIENT
Start: 2018-04-05 | End: 2019-03-21 | Stop reason: ALTCHOICE

## 2018-04-05 ASSESSMENT — PATIENT HEALTH QUESTIONNAIRE - PHQ9
4. FEELING TIRED OR HAVING LITTLE ENERGY: 3
5. POOR APPETITE OR OVEREATING: 2
2. FEELING DOWN, DEPRESSED OR HOPELESS: 3
10. IF YOU CHECKED OFF ANY PROBLEMS, HOW DIFFICULT HAVE THESE PROBLEMS MADE IT FOR YOU TO DO YOUR WORK, TAKE CARE OF THINGS AT HOME, OR GET ALONG WITH OTHER PEOPLE: 2
9. THOUGHTS THAT YOU WOULD BE BETTER OFF DEAD, OR OF HURTING YOURSELF: 0
SUM OF ALL RESPONSES TO PHQ9 QUESTIONS 1 & 2: 5
3. TROUBLE FALLING OR STAYING ASLEEP: 3
SUM OF ALL RESPONSES TO PHQ QUESTIONS 1-9: 18
1. LITTLE INTEREST OR PLEASURE IN DOING THINGS: 2
8. MOVING OR SPEAKING SO SLOWLY THAT OTHER PEOPLE COULD HAVE NOTICED. OR THE OPPOSITE, BEING SO FIGETY OR RESTLESS THAT YOU HAVE BEEN MOVING AROUND A LOT MORE THAN USUAL: 1
7. TROUBLE CONCENTRATING ON THINGS, SUCH AS READING THE NEWSPAPER OR WATCHING TELEVISION: 2
6. FEELING BAD ABOUT YOURSELF - OR THAT YOU ARE A FAILURE OR HAVE LET YOURSELF OR YOUR FAMILY DOWN: 2

## 2018-04-06 DIAGNOSIS — Z79.4 UNCONTROLLED TYPE 2 DIABETES MELLITUS WITH HYPERGLYCEMIA, WITH LONG-TERM CURRENT USE OF INSULIN (HCC): Primary | ICD-10-CM

## 2018-04-06 DIAGNOSIS — K21.9 GASTROESOPHAGEAL REFLUX DISEASE WITHOUT ESOPHAGITIS: ICD-10-CM

## 2018-04-06 DIAGNOSIS — I10 ESSENTIAL HYPERTENSION: ICD-10-CM

## 2018-04-06 DIAGNOSIS — E11.65 UNCONTROLLED TYPE 2 DIABETES MELLITUS WITH HYPERGLYCEMIA, WITH LONG-TERM CURRENT USE OF INSULIN (HCC): Primary | ICD-10-CM

## 2018-04-06 DIAGNOSIS — E78.2 MIXED HYPERLIPIDEMIA: ICD-10-CM

## 2018-04-06 RX ORDER — PEN NEEDLE, DIABETIC 32 GX 1/4"
NEEDLE, DISPOSABLE MISCELLANEOUS
Qty: 120 EACH | Refills: 3 | Status: SHIPPED | OUTPATIENT
Start: 2018-04-06 | End: 2018-07-30 | Stop reason: SDUPTHER

## 2018-04-06 RX ORDER — GLIPIZIDE 5 MG/1
TABLET ORAL
Qty: 60 TABLET | Refills: 3 | Status: SHIPPED | OUTPATIENT
Start: 2018-04-06 | End: 2018-07-30 | Stop reason: SDUPTHER

## 2018-04-06 RX ORDER — ATORVASTATIN CALCIUM 80 MG/1
TABLET, FILM COATED ORAL
Qty: 30 TABLET | Refills: 3 | Status: SHIPPED | OUTPATIENT
Start: 2018-04-06 | End: 2018-07-30 | Stop reason: SDUPTHER

## 2018-04-06 RX ORDER — FAMOTIDINE 20 MG/1
TABLET, FILM COATED ORAL
Qty: 60 TABLET | Refills: 3 | Status: SHIPPED | OUTPATIENT
Start: 2018-04-06 | End: 2018-07-30 | Stop reason: SDUPTHER

## 2018-04-06 RX ORDER — CARVEDILOL 3.12 MG/1
TABLET ORAL
Qty: 60 TABLET | Refills: 3 | Status: SHIPPED | OUTPATIENT
Start: 2018-04-06 | End: 2018-07-30 | Stop reason: SDUPTHER

## 2018-04-06 ASSESSMENT — ENCOUNTER SYMPTOMS
DOUBLE VISION: 0
SORE THROAT: 0
SHORTNESS OF BREATH: 0
COUGH: 0
WHEEZING: 0
SINUS PAIN: 0
PHOTOPHOBIA: 0
SPUTUM PRODUCTION: 0
NAUSEA: 0
ABDOMINAL PAIN: 1
ORTHOPNEA: 0
VOMITING: 0
BLURRED VISION: 1
HEARTBURN: 0
BACK PAIN: 0
DIARRHEA: 0

## 2018-04-18 ENCOUNTER — OFFICE VISIT (OUTPATIENT)
Dept: PODIATRY | Age: 58
End: 2018-04-18
Payer: COMMERCIAL

## 2018-04-18 VITALS
WEIGHT: 279.8 LBS | SYSTOLIC BLOOD PRESSURE: 113 MMHG | DIASTOLIC BLOOD PRESSURE: 78 MMHG | BODY MASS INDEX: 49.58 KG/M2 | HEART RATE: 78 BPM | HEIGHT: 63 IN

## 2018-04-18 DIAGNOSIS — E11.65 UNCONTROLLED TYPE 2 DIABETES MELLITUS WITH HYPERGLYCEMIA, WITH LONG-TERM CURRENT USE OF INSULIN (HCC): Primary | ICD-10-CM

## 2018-04-18 DIAGNOSIS — Z79.4 UNCONTROLLED TYPE 2 DIABETES MELLITUS WITH HYPERGLYCEMIA, WITH LONG-TERM CURRENT USE OF INSULIN (HCC): Primary | ICD-10-CM

## 2018-04-18 DIAGNOSIS — M79.672 PAIN IN BOTH FEET: ICD-10-CM

## 2018-04-18 DIAGNOSIS — L84 CORNS AND CALLOSITIES: ICD-10-CM

## 2018-04-18 DIAGNOSIS — M79.671 PAIN IN BOTH FEET: ICD-10-CM

## 2018-04-18 DIAGNOSIS — B35.1 DERMATOPHYTOSIS OF NAIL: ICD-10-CM

## 2018-04-18 PROCEDURE — 11721 DEBRIDE NAIL 6 OR MORE: CPT | Performed by: STUDENT IN AN ORGANIZED HEALTH CARE EDUCATION/TRAINING PROGRAM

## 2018-04-18 PROCEDURE — 99203 OFFICE O/P NEW LOW 30 MIN: CPT | Performed by: STUDENT IN AN ORGANIZED HEALTH CARE EDUCATION/TRAINING PROGRAM

## 2018-04-18 PROCEDURE — 11056 PARNG/CUTG B9 HYPRKR LES 2-4: CPT | Performed by: STUDENT IN AN ORGANIZED HEALTH CARE EDUCATION/TRAINING PROGRAM

## 2018-04-18 PROCEDURE — 99213 OFFICE O/P EST LOW 20 MIN: CPT

## 2018-04-19 ENCOUNTER — OFFICE VISIT (OUTPATIENT)
Dept: INTERNAL MEDICINE | Age: 58
End: 2018-04-19
Payer: COMMERCIAL

## 2018-04-19 ENCOUNTER — HOSPITAL ENCOUNTER (OUTPATIENT)
Age: 58
Setting detail: SPECIMEN
Discharge: HOME | End: 2018-04-19
Payer: COMMERCIAL

## 2018-04-19 VITALS
DIASTOLIC BLOOD PRESSURE: 72 MMHG | BODY MASS INDEX: 49.61 KG/M2 | SYSTOLIC BLOOD PRESSURE: 123 MMHG | HEIGHT: 63 IN | WEIGHT: 280 LBS | HEART RATE: 88 BPM

## 2018-04-19 DIAGNOSIS — Z79.4 UNCONTROLLED TYPE 2 DIABETES MELLITUS WITH HYPERGLYCEMIA, WITH LONG-TERM CURRENT USE OF INSULIN (HCC): Primary | ICD-10-CM

## 2018-04-19 DIAGNOSIS — E11.65 UNCONTROLLED TYPE 2 DIABETES MELLITUS WITH HYPERGLYCEMIA, WITH LONG-TERM CURRENT USE OF INSULIN (HCC): ICD-10-CM

## 2018-04-19 DIAGNOSIS — F51.01 PRIMARY INSOMNIA: ICD-10-CM

## 2018-04-19 DIAGNOSIS — Z79.4 UNCONTROLLED TYPE 2 DIABETES MELLITUS WITH HYPERGLYCEMIA, WITH LONG-TERM CURRENT USE OF INSULIN (HCC): ICD-10-CM

## 2018-04-19 DIAGNOSIS — I10 ESSENTIAL HYPERTENSION: ICD-10-CM

## 2018-04-19 DIAGNOSIS — E11.65 UNCONTROLLED TYPE 2 DIABETES MELLITUS WITH HYPERGLYCEMIA, WITH LONG-TERM CURRENT USE OF INSULIN (HCC): Primary | ICD-10-CM

## 2018-04-19 DIAGNOSIS — E66.01 MORBID OBESITY WITH BMI OF 40.0-44.9, ADULT (HCC): ICD-10-CM

## 2018-04-19 DIAGNOSIS — Z11.4 ENCOUNTER FOR SCREENING FOR HIV: ICD-10-CM

## 2018-04-19 LAB
ABSOLUTE EOS #: 0.21 K/UL (ref 0–0.44)
ABSOLUTE IMMATURE GRANULOCYTE: 0.03 K/UL (ref 0–0.3)
ABSOLUTE LYMPH #: 2.54 K/UL (ref 1.1–3.7)
ABSOLUTE MONO #: 0.54 K/UL (ref 0.1–1.2)
ALBUMIN SERPL-MCNC: 3.6 G/DL (ref 3.5–5.2)
ALBUMIN/GLOBULIN RATIO: 0.8 (ref 1–2.5)
ALP BLD-CCNC: 175 U/L (ref 35–104)
ALT SERPL-CCNC: 15 U/L (ref 5–33)
ANION GAP SERPL CALCULATED.3IONS-SCNC: 14 MMOL/L (ref 9–17)
AST SERPL-CCNC: 13 U/L
BASOPHILS # BLD: 0 % (ref 0–2)
BASOPHILS ABSOLUTE: 0.04 K/UL (ref 0–0.2)
BILIRUB SERPL-MCNC: 0.21 MG/DL (ref 0.3–1.2)
BUN BLDV-MCNC: 7 MG/DL (ref 6–20)
BUN/CREAT BLD: ABNORMAL (ref 9–20)
CALCIUM SERPL-MCNC: 9.1 MG/DL (ref 8.6–10.4)
CHLORIDE BLD-SCNC: 106 MMOL/L (ref 98–107)
CO2: 22 MMOL/L (ref 20–31)
CREAT SERPL-MCNC: 0.64 MG/DL (ref 0.5–0.9)
DIFFERENTIAL TYPE: ABNORMAL
EOSINOPHILS RELATIVE PERCENT: 2 % (ref 1–4)
GFR AFRICAN AMERICAN: >60 ML/MIN
GFR NON-AFRICAN AMERICAN: >60 ML/MIN
GFR SERPL CREATININE-BSD FRML MDRD: ABNORMAL ML/MIN/{1.73_M2}
GFR SERPL CREATININE-BSD FRML MDRD: ABNORMAL ML/MIN/{1.73_M2}
GLUCOSE BLD-MCNC: 174 MG/DL (ref 70–99)
HCT VFR BLD CALC: 39.4 % (ref 36.3–47.1)
HEMOGLOBIN: 11.8 G/DL (ref 11.9–15.1)
HIV AG/AB: NONREACTIVE
IMMATURE GRANULOCYTES: 0 %
LYMPHOCYTES # BLD: 24 % (ref 24–43)
MCH RBC QN AUTO: 26.7 PG (ref 25.2–33.5)
MCHC RBC AUTO-ENTMCNC: 29.9 G/DL (ref 28.4–34.8)
MCV RBC AUTO: 89.1 FL (ref 82.6–102.9)
MONOCYTES # BLD: 5 % (ref 3–12)
NRBC AUTOMATED: 0 PER 100 WBC
PDW BLD-RTO: 15.7 % (ref 11.8–14.4)
PLATELET # BLD: 307 K/UL (ref 138–453)
PLATELET ESTIMATE: ABNORMAL
PMV BLD AUTO: 12.4 FL (ref 8.1–13.5)
POTASSIUM SERPL-SCNC: 3.8 MMOL/L (ref 3.7–5.3)
RBC # BLD: 4.42 M/UL (ref 3.95–5.11)
RBC # BLD: ABNORMAL 10*6/UL
SEG NEUTROPHILS: 69 % (ref 36–65)
SEGMENTED NEUTROPHILS ABSOLUTE COUNT: 7.18 K/UL (ref 1.5–8.1)
SODIUM BLD-SCNC: 142 MMOL/L (ref 135–144)
TOTAL PROTEIN: 8.1 G/DL (ref 6.4–8.3)
WBC # BLD: 10.5 K/UL (ref 3.5–11.3)
WBC # BLD: ABNORMAL 10*3/UL

## 2018-04-19 PROCEDURE — 3046F HEMOGLOBIN A1C LEVEL >9.0%: CPT | Performed by: INTERNAL MEDICINE

## 2018-04-19 PROCEDURE — 3017F COLORECTAL CA SCREEN DOC REV: CPT | Performed by: INTERNAL MEDICINE

## 2018-04-19 PROCEDURE — 4004F PT TOBACCO SCREEN RCVD TLK: CPT | Performed by: INTERNAL MEDICINE

## 2018-04-19 PROCEDURE — 85025 COMPLETE CBC W/AUTO DIFF WBC: CPT

## 2018-04-19 PROCEDURE — 99213 OFFICE O/P EST LOW 20 MIN: CPT | Performed by: INTERNAL MEDICINE

## 2018-04-19 PROCEDURE — 36415 COLL VENOUS BLD VENIPUNCTURE: CPT

## 2018-04-19 PROCEDURE — 99212 OFFICE O/P EST SF 10 MIN: CPT

## 2018-04-19 PROCEDURE — 87389 HIV-1 AG W/HIV-1&-2 AB AG IA: CPT

## 2018-04-19 PROCEDURE — G8417 CALC BMI ABV UP PARAM F/U: HCPCS | Performed by: INTERNAL MEDICINE

## 2018-04-19 PROCEDURE — 80053 COMPREHEN METABOLIC PANEL: CPT

## 2018-04-19 PROCEDURE — 2022F DILAT RTA XM EVC RTNOPTHY: CPT | Performed by: INTERNAL MEDICINE

## 2018-04-19 PROCEDURE — G8427 DOCREV CUR MEDS BY ELIG CLIN: HCPCS | Performed by: INTERNAL MEDICINE

## 2018-04-19 PROCEDURE — 3014F SCREEN MAMMO DOC REV: CPT | Performed by: INTERNAL MEDICINE

## 2018-04-19 RX ORDER — CHOLECALCIFEROL (VITAMIN D3) 125 MCG
5 CAPSULE ORAL DAILY
Qty: 30 TABLET | Refills: 3 | Status: SHIPPED | OUTPATIENT
Start: 2018-04-19 | End: 2018-05-01 | Stop reason: SDUPTHER

## 2018-04-19 ASSESSMENT — ENCOUNTER SYMPTOMS
SHORTNESS OF BREATH: 0
ORTHOPNEA: 0
COUGH: 0
VOMITING: 0
NAUSEA: 0
HEARTBURN: 0
ABDOMINAL PAIN: 0
SPUTUM PRODUCTION: 0

## 2018-05-01 DIAGNOSIS — E11.65 CONTROLLED TYPE 2 DIABETES MELLITUS WITH HYPERGLYCEMIA, WITH LONG-TERM CURRENT USE OF INSULIN (HCC): ICD-10-CM

## 2018-05-01 DIAGNOSIS — Z79.4 CONTROLLED TYPE 2 DIABETES MELLITUS WITH HYPERGLYCEMIA, WITH LONG-TERM CURRENT USE OF INSULIN (HCC): ICD-10-CM

## 2018-05-01 DIAGNOSIS — F51.01 PRIMARY INSOMNIA: ICD-10-CM

## 2018-05-01 RX ORDER — CHOLECALCIFEROL (VITAMIN D3) 125 MCG
5 CAPSULE ORAL DAILY
Qty: 30 TABLET | Refills: 3 | Status: SHIPPED | OUTPATIENT
Start: 2018-05-01 | End: 2018-09-25 | Stop reason: SDUPTHER

## 2018-05-01 RX ORDER — LANOLIN ALCOHOL/MO/W.PET/CERES
CREAM (GRAM) TOPICAL
Qty: 30 TABLET | Refills: 3 | OUTPATIENT
Start: 2018-05-01

## 2018-05-01 RX ORDER — ALCOHOL ANTISEPTIC PADS
PADS, MEDICATED (EA) TOPICAL
Qty: 100 EACH | Refills: 5 | Status: SHIPPED | OUTPATIENT
Start: 2018-05-01 | End: 2018-10-31 | Stop reason: SDUPTHER

## 2018-05-01 RX ORDER — LANCETS 30 GAUGE
EACH MISCELLANEOUS
Qty: 100 EACH | Refills: 5 | Status: SHIPPED | OUTPATIENT
Start: 2018-05-01 | End: 2018-10-31 | Stop reason: SDUPTHER

## 2018-05-01 RX ORDER — CALCIUM CITRATE/VITAMIN D3 200MG-6.25
TABLET ORAL
Qty: 100 STRIP | Refills: 5 | Status: SHIPPED | OUTPATIENT
Start: 2018-05-01 | End: 2019-10-24 | Stop reason: SDUPTHER

## 2018-05-03 ENCOUNTER — OFFICE VISIT (OUTPATIENT)
Dept: INTERNAL MEDICINE | Age: 58
End: 2018-05-03
Payer: COMMERCIAL

## 2018-05-03 ENCOUNTER — CARE COORDINATION (OUTPATIENT)
Dept: CARE COORDINATION | Age: 58
End: 2018-05-03

## 2018-05-03 VITALS
BODY MASS INDEX: 49.08 KG/M2 | DIASTOLIC BLOOD PRESSURE: 68 MMHG | HEIGHT: 63 IN | WEIGHT: 277 LBS | SYSTOLIC BLOOD PRESSURE: 122 MMHG | HEART RATE: 90 BPM

## 2018-05-03 DIAGNOSIS — I10 ESSENTIAL HYPERTENSION: ICD-10-CM

## 2018-05-03 DIAGNOSIS — E11.65 UNCONTROLLED TYPE 2 DIABETES MELLITUS WITH HYPERGLYCEMIA, WITH LONG-TERM CURRENT USE OF INSULIN (HCC): Primary | ICD-10-CM

## 2018-05-03 DIAGNOSIS — S99.921A RIGHT FOOT INJURY, INITIAL ENCOUNTER: ICD-10-CM

## 2018-05-03 DIAGNOSIS — M15.9 PRIMARY OSTEOARTHRITIS INVOLVING MULTIPLE JOINTS: ICD-10-CM

## 2018-05-03 DIAGNOSIS — Z79.4 UNCONTROLLED TYPE 2 DIABETES MELLITUS WITH HYPERGLYCEMIA, WITH LONG-TERM CURRENT USE OF INSULIN (HCC): Primary | ICD-10-CM

## 2018-05-03 DIAGNOSIS — E11.8 DM (DIABETES MELLITUS) WITH COMPLICATIONS (HCC): ICD-10-CM

## 2018-05-03 PROCEDURE — 99213 OFFICE O/P EST LOW 20 MIN: CPT | Performed by: INTERNAL MEDICINE

## 2018-05-03 PROCEDURE — 99212 OFFICE O/P EST SF 10 MIN: CPT | Performed by: INTERNAL MEDICINE

## 2018-05-03 RX ORDER — DIPHENHYDRAMINE HYDROCHLORIDE 25 MG/1
CAPSULE ORAL
COMMUNITY
Start: 2018-05-01 | End: 2018-08-28 | Stop reason: SDUPTHER

## 2018-05-03 RX ORDER — TRAMADOL HYDROCHLORIDE 50 MG/1
50 TABLET ORAL DAILY PRN
Qty: 30 TABLET | Refills: 3 | Status: SHIPPED | OUTPATIENT
Start: 2018-05-03 | End: 2018-06-02

## 2018-05-03 ASSESSMENT — ENCOUNTER SYMPTOMS
VOMITING: 0
SPUTUM PRODUCTION: 0
COUGH: 0
NAUSEA: 0
SHORTNESS OF BREATH: 0
HEARTBURN: 0
ORTHOPNEA: 0
ABDOMINAL PAIN: 0

## 2018-05-24 ENCOUNTER — CARE COORDINATION (OUTPATIENT)
Dept: CARE COORDINATION | Age: 58
End: 2018-05-24

## 2018-05-25 ENCOUNTER — CARE COORDINATION (OUTPATIENT)
Dept: INTERNAL MEDICINE | Age: 58
End: 2018-05-25

## 2018-06-03 DIAGNOSIS — E11.21 CONTROLLED TYPE 2 DIABETES MELLITUS WITH DIABETIC NEPHROPATHY, WITHOUT LONG-TERM CURRENT USE OF INSULIN (HCC): ICD-10-CM

## 2018-06-03 DIAGNOSIS — R21 RASH AND NONSPECIFIC SKIN ERUPTION: ICD-10-CM

## 2018-06-03 DIAGNOSIS — L23.9 ALLERGIC DERMATITIS: ICD-10-CM

## 2018-06-05 RX ORDER — TRIAMCINOLONE ACETONIDE 0.25 MG/G
CREAM TOPICAL
Qty: 1 TUBE | Refills: 0 | OUTPATIENT
Start: 2018-06-05

## 2018-06-05 RX ORDER — ASPIRIN 81 MG
TABLET, DELAYED RELEASE (ENTERIC COATED) ORAL
Qty: 30 TABLET | Refills: 3 | Status: SHIPPED | OUTPATIENT
Start: 2018-06-05 | End: 2018-09-25 | Stop reason: SDUPTHER

## 2018-06-06 ENCOUNTER — TELEPHONE (OUTPATIENT)
Dept: INTERNAL MEDICINE | Age: 58
End: 2018-06-06

## 2018-06-06 RX ORDER — DIPHENHYDRAMINE HCL 25 MG
CAPSULE ORAL
Qty: 30 CAPSULE | Refills: 1 | Status: SHIPPED | OUTPATIENT
Start: 2018-06-06 | End: 2018-07-30 | Stop reason: SDUPTHER

## 2018-06-06 RX ORDER — TRIAMCINOLONE ACETONIDE 0.25 MG/G
CREAM TOPICAL
Qty: 1 TUBE | Refills: 1 | Status: SHIPPED | OUTPATIENT
Start: 2018-06-06 | End: 2018-07-30 | Stop reason: SDUPTHER

## 2018-07-16 ENCOUNTER — APPOINTMENT (OUTPATIENT)
Dept: CT IMAGING | Age: 58
End: 2018-07-16
Payer: COMMERCIAL

## 2018-07-16 ENCOUNTER — HOSPITAL ENCOUNTER (EMERGENCY)
Age: 58
Discharge: HOME | End: 2018-07-16
Attending: EMERGENCY MEDICINE
Payer: COMMERCIAL

## 2018-07-16 VITALS
SYSTOLIC BLOOD PRESSURE: 138 MMHG | OXYGEN SATURATION: 97 % | TEMPERATURE: 98.6 F | HEART RATE: 78 BPM | DIASTOLIC BLOOD PRESSURE: 81 MMHG | RESPIRATION RATE: 18 BRPM | WEIGHT: 277 LBS | BODY MASS INDEX: 49.07 KG/M2

## 2018-07-16 DIAGNOSIS — R10.9 ABDOMINAL PAIN, UNSPECIFIED ABDOMINAL LOCATION: Primary | ICD-10-CM

## 2018-07-16 LAB
ABSOLUTE EOS #: 0.19 K/UL (ref 0–0.44)
ABSOLUTE IMMATURE GRANULOCYTE: 0.05 K/UL (ref 0–0.3)
ABSOLUTE LYMPH #: 2.63 K/UL (ref 1.1–3.7)
ABSOLUTE MONO #: 0.71 K/UL (ref 0.1–1.2)
ALBUMIN SERPL-MCNC: 3.9 G/DL (ref 3.5–5.2)
ALBUMIN/GLOBULIN RATIO: 0.8 (ref 1–2.5)
ALP BLD-CCNC: 267 U/L (ref 35–104)
ALT SERPL-CCNC: 18 U/L (ref 5–33)
ANION GAP SERPL CALCULATED.3IONS-SCNC: 13 MMOL/L (ref 9–17)
AST SERPL-CCNC: 17 U/L
BASOPHILS # BLD: 0 % (ref 0–2)
BASOPHILS ABSOLUTE: 0.04 K/UL (ref 0–0.2)
BILIRUB SERPL-MCNC: 0.18 MG/DL (ref 0.3–1.2)
BUN BLDV-MCNC: 10 MG/DL (ref 6–20)
BUN/CREAT BLD: ABNORMAL (ref 9–20)
CALCIUM SERPL-MCNC: 8.9 MG/DL (ref 8.6–10.4)
CHLORIDE BLD-SCNC: 100 MMOL/L (ref 98–107)
CO2: 22 MMOL/L (ref 20–31)
CREAT SERPL-MCNC: 0.61 MG/DL (ref 0.5–0.9)
DIFFERENTIAL TYPE: ABNORMAL
EKG ATRIAL RATE: 74 BPM
EKG P AXIS: 52 DEGREES
EKG P-R INTERVAL: 146 MS
EKG Q-T INTERVAL: 410 MS
EKG QRS DURATION: 76 MS
EKG QTC CALCULATION (BAZETT): 455 MS
EKG R AXIS: 14 DEGREES
EKG T AXIS: 21 DEGREES
EKG VENTRICULAR RATE: 74 BPM
EOSINOPHILS RELATIVE PERCENT: 2 % (ref 1–4)
GFR AFRICAN AMERICAN: >60 ML/MIN
GFR NON-AFRICAN AMERICAN: >60 ML/MIN
GFR SERPL CREATININE-BSD FRML MDRD: ABNORMAL ML/MIN/{1.73_M2}
GFR SERPL CREATININE-BSD FRML MDRD: ABNORMAL ML/MIN/{1.73_M2}
GLUCOSE BLD-MCNC: 377 MG/DL (ref 70–99)
HCT VFR BLD CALC: 40.6 % (ref 36.3–47.1)
HEMOGLOBIN: 13 G/DL (ref 11.9–15.1)
IMMATURE GRANULOCYTES: 0 %
LIPASE: 47 U/L (ref 13–60)
LYMPHOCYTES # BLD: 23 % (ref 24–43)
MCH RBC QN AUTO: 27.2 PG (ref 25.2–33.5)
MCHC RBC AUTO-ENTMCNC: 32 G/DL (ref 28.4–34.8)
MCV RBC AUTO: 84.9 FL (ref 82.6–102.9)
MONOCYTES # BLD: 6 % (ref 3–12)
NRBC AUTOMATED: 0 PER 100 WBC
PDW BLD-RTO: 14.2 % (ref 11.8–14.4)
PLATELET # BLD: 192 K/UL (ref 138–453)
PLATELET ESTIMATE: ABNORMAL
PMV BLD AUTO: 12.4 FL (ref 8.1–13.5)
POTASSIUM SERPL-SCNC: 5.1 MMOL/L (ref 3.7–5.3)
RBC # BLD: 4.78 M/UL (ref 3.95–5.11)
RBC # BLD: ABNORMAL 10*6/UL
SEG NEUTROPHILS: 69 % (ref 36–65)
SEGMENTED NEUTROPHILS ABSOLUTE COUNT: 7.81 K/UL (ref 1.5–8.1)
SODIUM BLD-SCNC: 135 MMOL/L (ref 135–144)
TOTAL PROTEIN: 8.5 G/DL (ref 6.4–8.3)
WBC # BLD: 11.4 K/UL (ref 3.5–11.3)
WBC # BLD: ABNORMAL 10*3/UL

## 2018-07-16 PROCEDURE — 96376 TX/PRO/DX INJ SAME DRUG ADON: CPT

## 2018-07-16 PROCEDURE — 80053 COMPREHEN METABOLIC PANEL: CPT

## 2018-07-16 PROCEDURE — 96374 THER/PROPH/DIAG INJ IV PUSH: CPT

## 2018-07-16 PROCEDURE — 6360000002 HC RX W HCPCS: Performed by: STUDENT IN AN ORGANIZED HEALTH CARE EDUCATION/TRAINING PROGRAM

## 2018-07-16 PROCEDURE — 85025 COMPLETE CBC W/AUTO DIFF WBC: CPT

## 2018-07-16 PROCEDURE — 83690 ASSAY OF LIPASE: CPT

## 2018-07-16 PROCEDURE — 93005 ELECTROCARDIOGRAM TRACING: CPT

## 2018-07-16 PROCEDURE — 74177 CT ABD & PELVIS W/CONTRAST: CPT

## 2018-07-16 PROCEDURE — 6360000004 HC RX CONTRAST MEDICATION: Performed by: STUDENT IN AN ORGANIZED HEALTH CARE EDUCATION/TRAINING PROGRAM

## 2018-07-16 PROCEDURE — G0384 LEV 5 HOSP TYPE B ED VISIT: HCPCS

## 2018-07-16 RX ORDER — DICYCLOMINE HCL 20 MG
20 TABLET ORAL 3 TIMES DAILY PRN
Qty: 120 TABLET | Refills: 0 | Status: SHIPPED | OUTPATIENT
Start: 2018-07-16 | End: 2019-03-21 | Stop reason: SDUPTHER

## 2018-07-16 RX ORDER — OXYCODONE HYDROCHLORIDE AND ACETAMINOPHEN 5; 325 MG/1; MG/1
1-2 TABLET ORAL EVERY 6 HOURS PRN
Qty: 10 TABLET | Refills: 0 | Status: SHIPPED | OUTPATIENT
Start: 2018-07-16 | End: 2018-07-21

## 2018-07-16 RX ORDER — NYSTATIN 100000 [USP'U]/G
POWDER TOPICAL
Qty: 1 BOTTLE | Refills: 0 | Status: SHIPPED | OUTPATIENT
Start: 2018-07-16 | End: 2018-08-20 | Stop reason: SDUPTHER

## 2018-07-16 RX ORDER — MORPHINE SULFATE 4 MG/ML
4 INJECTION, SOLUTION INTRAMUSCULAR; INTRAVENOUS ONCE
Status: COMPLETED | OUTPATIENT
Start: 2018-07-16 | End: 2018-07-16

## 2018-07-16 RX ADMIN — MORPHINE SULFATE 4 MG: 4 INJECTION INTRAVENOUS at 15:13

## 2018-07-16 RX ADMIN — IOPAMIDOL 75 ML: 755 INJECTION, SOLUTION INTRAVENOUS at 16:37

## 2018-07-16 RX ADMIN — MORPHINE SULFATE 4 MG: 4 INJECTION INTRAVENOUS at 18:16

## 2018-07-16 ASSESSMENT — PAIN DESCRIPTION - ORIENTATION: ORIENTATION: LOWER

## 2018-07-16 ASSESSMENT — ENCOUNTER SYMPTOMS
CHEST TIGHTNESS: 0
TROUBLE SWALLOWING: 0
SORE THROAT: 0
ABDOMINAL PAIN: 1
VOMITING: 0
COUGH: 0
BACK PAIN: 0
PHOTOPHOBIA: 0
NAUSEA: 0
WHEEZING: 0
SHORTNESS OF BREATH: 0

## 2018-07-16 ASSESSMENT — PAIN DESCRIPTION - DESCRIPTORS: DESCRIPTORS: SHARP

## 2018-07-16 ASSESSMENT — PAIN SCALES - GENERAL
PAINLEVEL_OUTOF10: 10

## 2018-07-16 ASSESSMENT — PAIN DESCRIPTION - FREQUENCY: FREQUENCY: INTERMITTENT

## 2018-07-16 ASSESSMENT — PAIN DESCRIPTION - LOCATION: LOCATION: ABDOMEN

## 2018-07-16 NOTE — ED NOTES
Pt to ED alert and oriented x4. Ambulatory with steady gait. Pt complaining of lower abdominal pain. Pt states extensive abdominal surgeries with colostomy bag placement and recent abscess underneath colostomy. Pt states intermittent sharp pains. Pt denies issues with BMs or urinating. States intermittent nausea with no emesis. Pt states she usually never vomits. NAD.  Pt placed on BP and pulse ox monitoring     Isaias Meyer RN  07/16/18 [id number]

## 2018-07-16 NOTE — ED PROVIDER NOTES
[facility]  ED  Emergency Department Encounter  Emergency Medicine Resident     Pt Name: Sandra Steele  MRN: [id number]  Corinegfrafi 1960  Date of evaluation: 7/16/18  PCP:  Mary Vuong MD    CHIEF COMPLAINT       Chief Complaint   Patient presents with    Abdominal Pain     extensive surgeries on abdomen       HISTORY OF PRESENT ILLNESS  (Location/Symptom, Timing/Onset, Context/Setting, Quality, Duration, Modifying Factors, Severity.)      Sandra Steele is a 65 y.o. female who presents with Bilateral lower abdominal pain. Patient complaining of abdominal pain for the past couple days. She denies any fevers, chills, sweats, vomiting, diarrhea. Patient has an ostomy bag in left side of abdomen. She states she has had normal output, no bleeding from the back. No stoma swelling, discomfort. Patient has had multiple abdominal surgeries for adhesions/bowel obstructions. Dr. Annamaria Baltazar is her surgeon. She denies any dysuria. She states that she has itching and redness on the underside of her abdomen. PAST MEDICAL / SURGICAL / SOCIAL / FAMILY HISTORY      has a past medical history of Allergic rhinitis; Arthritis; Chronic back pain; Diverticulitis; GERD (gastroesophageal reflux disease); Headache(784.0); Hyperlipidemia; Hypertension; Knee pain; MDRO (multiple drug resistant organisms) resistance; Narcotic abuse; Obesity; Osteoarthritis; Rectovaginal fistula; Type II or unspecified type diabetes mellitus without mention of complication, not stated as uncontrolled; and Urinary incontinence. has a past surgical history that includes Knee Arthroplasty; Abdomen surgery (5/1/16); ventral hernia repair (578133); Abdomen surgery (01/21/2014); and Abdominal exploration surgery (01/02/2017). Social History     Social History    Marital status: Single     Spouse name: N/A    Number of children: N/A    Years of education: N/A     Occupational History    Not on file.      Social History MD Laya   ULTICARE ALCOHOL SWABS 70 % PADS USE AS DIRECTED 5/1/18   Dilip Asif MD   metFORMIN (GLUCOPHAGE) 1000 MG tablet TAKE ONE TABLET BY MOUTH TWICE DAILY WITH MEALS 5/1/18   Dilip Asif MD   melatonin 5 MG TABS tablet Take 1 tablet by mouth daily 5/1/18   Dilip Asif MD   EASY TOUCH PEN NEEDLES 32G X 6 MM MISC Use to take basaglar and humalog insulin. 4/6/18   Dilip Asif MD   atorvastatin (LIPITOR) 80 MG tablet TAKE ONE TABLET BY MOUTH ONCE DAILY 4/6/18   Dilip Asif MD   carvedilol (COREG) 3.125 MG tablet TAKE ONE TABLET BY MOUTH TWICE DAILY 4/6/18   Dilip Asif MD   famotidine (PEPCID) 20 MG tablet TAKE ONE TABLET BY MOUTH TWICE DAILY 4/6/18   Dilip Asif MD   glipiZIDE (GLUCOTROL) 5 MG tablet TAKE ONE TABLET BY MOUTH TWICE DAILY 4/6/18   Sirisha Emery MD   clotrimazole-betamethasone (LOTRISONE) 1-0.05 % cream Apply to the vaginal/rectal area bid externally x 4 days then daily for 3 days 4/5/18   Brendan Painter MD   ondansetron (ZOFRAN) 4 MG tablet Take 1 tablet by mouth every 8 hours as needed for Nausea or Vomiting 2/15/18   Mauro Ramírez MD   Nebulizers (DEVIL[employer] TRAVELER NEBULIZER) MISC  10/31/17   Sonny Garza MD   nystatin (MYCOSTATIN) 528870 UNIT/GM powder Apply 3 times daily.  1/10/18   Chance Abdullahi DO   NICOTINE STEP 2 14 MG/24HR PLACE 1 PATCH ONTO THE SKIN DAILY 10/31/17   Sirisha Emery MD   Multiple Vitamins-Minerals (THERAPEUTIC MULTIVITAMIN-MINERALS) tablet Take 1 tablet by mouth daily 10/27/17 10/27/18  Brendan Painter MD   INSULIN SYRINGE .5CC/29G 29G X 1/2\" 0.5 ML MISC 1 each by Does not apply route daily 10/9/17   Sirisha Emery MD   Respiratory Therapy Supplies (NEBULIZER COMPRESSOR) KIT 1 kit by Does not apply route once for 1 dose 10/9/17 10/9/17  Sirisha Emery MD   DOCQLACE 100 MG capsule  9/12/17   Historical Provider, MD   gabapentin (NEURONTIN) 600 MG tablet  9/20/17 place, and time. She appears well-developed and well-nourished. HENT:   Head: Normocephalic and atraumatic. Nose: Nose normal.   Mouth/Throat: Oropharynx is clear and moist.   Eyes: EOM are normal. Pupils are equal, round, and reactive to light. Neck: Normal range of motion. Neck supple. Cardiovascular: Normal rate, regular rhythm, normal heart sounds and intact distal pulses. Pulmonary/Chest: Breath sounds normal. No respiratory distress. She has no wheezes. She has no rales. Abdominal: Soft. Bowel sounds are normal. She exhibits no distension. There is tenderness. Bilateral lower abdominal pain/tenderness. No guarding, rebound. Soft abdomen. Musculoskeletal: Normal range of motion. She exhibits no edema or deformity. Neurological: She is alert and oriented to person, place, and time. She has normal reflexes. Skin: Skin is warm and dry. Rash noted. No erythema. Bilateral inguinal erythematous rash, non-flaky, no drainage. Located under skin folds of abdomen bilaterally. Psychiatric: She has a normal mood and affect. Her behavior is normal.       DIFFERENTIAL  DIAGNOSIS     PLAN (LABS / IMAGING / EKG):  Orders Placed This Encounter   Procedures    Urine Culture    CT ABDOMEN PELVIS W IV CONTRAST    CBC Auto Differential    Comprehensive Metabolic Panel    Urinalysis with Microscopic    LIPASE    Inpatient consult to General Surgery    EKG 12 Lead       MEDICATIONS ORDERED:  Orders Placed This Encounter   Medications    morphine (PF) injection 4 mg    iopamidol (ISOVUE-370) 76 % injection 75 mL    morphine (PF) injection 4 mg    oxyCODONE-acetaminophen (PERCOCET) 5-325 MG per tablet     Sig: Take 1-2 tablets by mouth every 6 hours as needed for Pain for up to 5 days. .     Dispense:  10 tablet     Refill:  0    dicyclomine (BENTYL) 20 MG tablet     Sig: Take 1 tablet by mouth 3 times daily as needed (abd pain)     Dispense:  120 tablet     Refill:  0    nystatin (MYCOSTATIN) 705544 UNIT/GM powder     Sig: Apply topically 4 times daily.      Dispense:  1 Bottle     Refill:  0       DDX: Bowel obstruction/diverticulitis/gastroenteritis/fungal rash    DIAGNOSTIC RESULTS / EMERGENCY DEPARTMENT COURSE / MDM     LABS:  Results for orders placed or performed during the hospital encounter of 07/16/18   CBC Auto Differential   Result Value Ref Range    WBC 11.4 (H) 3.5 - 11.3 k/uL    RBC 4.78 3.95 - 5.11 m/uL    Hemoglobin 13.0 11.9 - 15.1 g/dL    Hematocrit 40.6 36.3 - 47.1 %    MCV 84.9 82.6 - 102.9 fL    MCH 27.2 25.2 - 33.5 pg    MCHC 32.0 28.4 - 34.8 g/dL    RDW 14.2 11.8 - 14.4 %    Platelets 279 237 - 536 k/uL    MPV 12.4 8.1 - 13.5 fL    NRBC Automated 0.0 0.0 per 100 WBC    Differential Type NOT REPORTED     Seg Neutrophils 69 (H) 36 - 65 %    Lymphocytes 23 (L) 24 - 43 %    Monocytes 6 3 - 12 %    Eosinophils % 2 1 - 4 %    Basophils 0 0 - 2 %    Immature Granulocytes 0 0 %    Segs Absolute 7.81 1.50 - 8.10 k/uL    Absolute Lymph # 2.63 1.10 - 3.70 k/uL    Absolute Mono # 0.71 0.10 - 1.20 k/uL    Absolute Eos # 0.19 0.00 - 0.44 k/uL    Basophils # 0.04 0.00 - 0.20 k/uL    Absolute Immature Granulocyte 0.05 0.00 - 0.30 k/uL    WBC Morphology NOT REPORTED     RBC Morphology NOT REPORTED     Platelet Estimate NOT REPORTED    Comprehensive Metabolic Panel   Result Value Ref Range    Glucose 377 (H) 70 - 99 mg/dL    BUN 10 6 - 20 mg/dL    CREATININE 0.61 0.50 - 0.90 mg/dL    Bun/Cre Ratio NOT REPORTED 9 - 20    Calcium 8.9 8.6 - 10.4 mg/dL    Sodium 135 135 - 144 mmol/L    Potassium 5.1 3.7 - 5.3 mmol/L    Chloride 100 98 - 107 mmol/L    CO2 22 20 - 31 mmol/L    Anion Gap 13 9 - 17 mmol/L    Alkaline Phosphatase 267 (H) 35 - 104 U/L    ALT 18 5 - 33 U/L    AST 17 <32 U/L    Total Bilirubin 0.18 (L) 0.3 - 1.2 mg/dL    Total Protein 8.5 (H) 6.4 - 8.3 g/dL    Alb 3.9 3.5 - 5.2 g/dL    Albumin/Globulin Ratio 0.8 (L) 1.0 - 2.5    GFR Non-African American >60 >60 mL/min    GFR African American >60 >60 mL/min    GFR Comment          GFR Staging NOT REPORTED    LIPASE   Result Value Ref Range    Lipase 47 13 - 60 U/L   EKG 12 Lead   Result Value Ref Range    Ventricular Rate 74 BPM    Atrial Rate 74 BPM    P-R Interval 146 ms    QRS Duration 76 ms    Q-T Interval 410 ms    QTc Calculation (Bazett) 455 ms    P Axis 52 degrees    R Axis 14 degrees    T Axis 21 degrees           RADIOLOGY:  None    EKG  None    All EKG's are interpreted by the Emergency Department Physician who either signs or Co-signs this chart in the absence of a cardiologist.    EMERGENCY DEPARTMENT COURSE:  Patient appears nontoxic, no acute distress, vital signs stable. Low concern for bowel obstruction. Patient does not have a distended, significant only tender abdomen. She otherwise appears well. We'll consult Gen. surgery and obtain CT abdomen pelvis with basic labs including lipase level. No significant lab abnormalities. CT scan of the abdomen is unremarkable. Agree recommending Bentyl, pain control. Patient does not have any changes in symptoms. We'll discharge patient home with outpatient follow-up with general surgery and prescription for pain medication and Bentyl. PROCEDURES:  None    CONSULTS:  IP CONSULT TO GENERAL SURGERY    CRITICAL CARE:  None    FINAL IMPRESSION      1.  Abdominal pain, unspecified abdominal location          DISPOSITION / PLAN     DISPOSITION Decision To Discharge 07/16/2018 06:48:17 PM      PATIENT REFERRED TO:  Roby Suarez MD  [address]  [phone]    Schedule an appointment as soon as possible for a visit in 1 week  for follow up from ED visit     Jewel Nick MD  [facility] [address]  [phone]    Schedule an appointment as soon as possible for a visit   As needed      DISCHARGE MEDICATIONS:  Discharge Medication List as of 7/16/2018  6:59 PM      START taking these medications    Details   oxyCODONE-acetaminophen (PERCOCET) 5-325 MG

## 2018-07-16 NOTE — ED PROVIDER NOTES
[facility]     Emergency Department     Faculty Attestation    I performed a history and physical examination of the patient and discussed management with the resident. I have reviewed and agree with the residents findings including all diagnostic interpretations, and treatment plans as written. Any areas of disagreement are noted on the chart. I was personally present for the key portions of any procedures. I have documented in the chart those procedures where I was not present during the key portions. I have reviewed the emergency nurses triage note. I agree with the chief complaint, past medical history, past surgical history, allergies, medications, social and family history as documented unless otherwise noted below. Documentation of the HPI, Physical Exam and Medical Decision Making performed by scribloly is based on my personal performance of the HPI, PE and MDM. For Physician Assistant/ Nurse Practitioner cases/documentation I have personally evaluated this patient and have completed at least one if not all key elements of the E/M (history, physical exam, and MDM). Additional findings are as noted. 66 yo F lower abdominal pain no fever extensive abdominal surgery Dr Daniel Stovall, pe obese abdomen, llq ostomy, tender bilateral lower extremities, mild guard no rebound or rigidity, multiple healed abdominal scars,     Ct stable, await call to Dr Daniel Stovall group for update, plan dc        Pre-hypertension/Hypertension: The patient has been informed that they may have pre-hypertension or Hypertension based on a blood pressure reading in the emergency department. I recommend that the patient call the primary care provider listed on their discharge instructions or a physician of their choice this week to arrange follow up for further evaluation of possible pre-hypertension or Hypertension.       EKG Interpretation    Interpreted by me, normal sinus rhythm, heart

## 2018-07-17 ENCOUNTER — CARE COORDINATION (OUTPATIENT)
Dept: CARE COORDINATION | Age: 58
End: 2018-07-17

## 2018-07-17 NOTE — CARE COORDINATION
Attempted to reach patient for CC follow up and recent ER visit. Message left on  to call [phone].

## 2018-07-20 ENCOUNTER — CARE COORDINATION (OUTPATIENT)
Dept: CARE COORDINATION | Age: 58
End: 2018-07-20

## 2018-07-26 NOTE — CARE COORDINATION
Ambulatory Care Coordination Note  7/26/2018  CM Risk Score: 9  Pan Mortality Risk Score:      ACC: Roberto Duvall, RN    Summary Note: [facility]. The person answering the phone stated that she wasn't available, but would give her the message that CC called. Will attempt to reach her next week. Care Coordination Interventions    Program Enrollment:  Rising Risk  Referral from Primary Care Provider:  Yes  Suggested Interventions and Community Resources  Behavorial Health:  Not Started  Diabetes Education:  Not Started  Registered Dietician:  Not Started  Smoking Cessation:  Not Started  Other Therapy Services:  Completed (Comment: Starting [employer])  Zone Management Tools: In Process         Goals Addressed     None          Prior to Admission medications    Medication Sig Start Date End Date Taking? Authorizing Provider   dicyclomine (BENTYL) 20 MG tablet Take 1 tablet by mouth 3 times daily as needed (abd pain) 7/16/18   Citlalli Friday, MD   nystatin (MYCOSTATIN) 843430 UNIT/GM powder Apply topically 4 times daily. 7/16/18   Estrellita Morris MD   diphenhydrAMINE (BENADRYL) 25 MG capsule TAKE ONE CAPSULE BY MOUTH TWICE DAILY AS NEEDED FOR ITCHING 6/6/18   Dilip Asif MD   triamcinolone (KENALOG) 0.025 % cream Apply topically 2 times daily.  6/6/18   Dilip Asif MD   ASPIRIN LOW DOSE 81 MG EC tablet TAKE ONE TABLET BY MOUTH DAILY 6/5/18   Najma Del Cid MD   BANOPHEN 25 MG capsule  5/1/18   Historical Provider, MD   insulin lispro (HUMALOG KWIKPEN) 100 UNIT/ML pen Inject 15 Units into the skin 3 times daily (before meals) 5/3/18   Dilip Asif MD   insulin glargine (BASAGLAR KWIKPEN) 100 UNIT/ML injection pen Inject 54 Units into the skin nightly 5/3/18   Najma Del Cid MD   EASY TOUCH LANCETS 30G/TWIST MISC USE AS DIRECTED THREE TIMES DAILY 5/1/18   Najma Del Cid MD   TRUE METRIX BLOOD GLUCOSE TEST strip TEST BLOOD SUGAR DAILY AS DIRECTED 5/1/18   Ailyn Zhang MD   ULTICARE ALCOHOL SWABS 70 % PADS USE AS DIRECTED 5/1/18   Dilip Asif MD   metFORMIN (GLUCOPHAGE) 1000 MG tablet TAKE ONE TABLET BY MOUTH TWICE DAILY WITH MEALS 5/1/18   Ailyn Zhang MD   melatonin 5 MG TABS tablet Take 1 tablet by mouth daily 5/1/18   Dilip Asif MD   EASY TOUCH PEN NEEDLES 32G X 6 MM MISC Use to take basaglar and humalog insulin. 4/6/18   Dilip Asif MD   atorvastatin (LIPITOR) 80 MG tablet TAKE ONE TABLET BY MOUTH ONCE DAILY 4/6/18   Dilip Asif MD   carvedilol (COREG) 3.125 MG tablet TAKE ONE TABLET BY MOUTH TWICE DAILY 4/6/18   Dilip Asif MD   famotidine (PEPCID) 20 MG tablet TAKE ONE TABLET BY MOUTH TWICE DAILY 4/6/18   Dilip Asif MD   glipiZIDE (GLUCOTROL) 5 MG tablet TAKE ONE TABLET BY MOUTH TWICE DAILY 4/6/18   Ailyn Zhang MD   clotrimazole-betamethasone (LOTRISONE) 1-0.05 % cream Apply to the vaginal/rectal area bid externally x 4 days then daily for 3 days 4/5/18   Kristi Paz MD   ondansetron (ZOFRAN) 4 MG tablet Take 1 tablet by mouth every 8 hours as needed for Nausea or Vomiting 2/15/18   Shae Patel MD   Nebulizers ([facility] TRAVELER NEBULIZER) MISC  10/31/17   Historical Provider, MD   nystatin (MYCOSTATIN) 801972 UNIT/GM powder Apply 3 times daily.  1/10/18   Chance Bennett,    NICOTINE STEP 2 14 MG/24HR PLACE 1 PATCH ONTO THE SKIN DAILY 10/31/17   Ailyn Zhang MD   Multiple Vitamins-Minerals (THERAPEUTIC MULTIVITAMIN-MINERALS) tablet Take 1 tablet by mouth daily 10/27/17 10/27/18  Kritsi Paz MD   INSULIN SYRINGE .5CC/29G 29G X 1/2\" 0.5 ML MISC 1 each by Does not apply route daily 10/9/17   Ailyn Zhang MD   Respiratory Therapy Supplies (NEBULIZER COMPRESSOR) KIT 1 kit by Does not apply route once for 1 dose 10/9/17 10/9/17  Ailyn Zhang MD   DOCQLACE 100 MG capsule  9/12/17   Historical Provider, MD   gabapentin (NEURONTIN) 600 MG tablet  9/20/17   Historical Provider, MD Avalos Lowers X 5/16\" 0.5 ML MISC  8/24/17   Historical Provider, MD   oxyCODONE HCl (OXY-IR) 10 MG immediate release tablet  7/12/17   Historical Provider, MD   Alcohol Swabs (CURITY ALCOHOL PREPS) 70 % PADS USE AS DIRECTED (THREE TIMES DAILY) 10/5/17   Lia Rodríguez MD   ipratropium-albuterol (DUONEB) 0.5-2.5 (3) MG/3ML SOLN nebulizer solution Inhale 3 mLs into the lungs 4 times daily as needed for Shortness of Breath 5/22/17   Carrie Ferrer MD   Camryn Fred INSULIN SYRINGE 31G X 5/16\" 1 ML MISC USE AS DIRECTED 4/30/17   Dilip Asif MD   vitamin D (ERGOCALCIFEROL) 93383 UNITS capsule Take 1 capsule by mouth once a week 1/4/17   Lauren Crowell MD   Incontinence Supplies MISC Use as needed for incontinence 9/23/16   Carrie Ferrer MD       Future Appointments  Date Time Provider Genna Tran   1/10/2019 [address], DO [id number]

## 2018-07-30 DIAGNOSIS — K21.9 GASTROESOPHAGEAL REFLUX DISEASE WITHOUT ESOPHAGITIS: ICD-10-CM

## 2018-07-30 DIAGNOSIS — I10 ESSENTIAL HYPERTENSION: ICD-10-CM

## 2018-07-30 DIAGNOSIS — L23.9 ALLERGIC DERMATITIS: ICD-10-CM

## 2018-07-30 DIAGNOSIS — E11.65 UNCONTROLLED TYPE 2 DIABETES MELLITUS WITH HYPERGLYCEMIA, WITH LONG-TERM CURRENT USE OF INSULIN (HCC): ICD-10-CM

## 2018-07-30 DIAGNOSIS — R21 RASH AND NONSPECIFIC SKIN ERUPTION: ICD-10-CM

## 2018-07-30 DIAGNOSIS — E78.2 MIXED HYPERLIPIDEMIA: ICD-10-CM

## 2018-07-30 DIAGNOSIS — Z79.4 UNCONTROLLED TYPE 2 DIABETES MELLITUS WITH HYPERGLYCEMIA, WITH LONG-TERM CURRENT USE OF INSULIN (HCC): ICD-10-CM

## 2018-07-30 RX ORDER — FAMOTIDINE 20 MG/1
TABLET, FILM COATED ORAL
Qty: 60 TABLET | Refills: 2 | Status: SHIPPED | OUTPATIENT
Start: 2018-07-30 | End: 2018-10-31 | Stop reason: SDUPTHER

## 2018-07-30 RX ORDER — CARVEDILOL 3.12 MG/1
TABLET ORAL
Qty: 60 TABLET | Refills: 2 | Status: SHIPPED | OUTPATIENT
Start: 2018-07-30 | End: 2018-10-31 | Stop reason: SDUPTHER

## 2018-07-30 RX ORDER — TRIAMCINOLONE ACETONIDE 0.25 MG/G
CREAM TOPICAL
Qty: 1 G | Refills: 2 | Status: SHIPPED | OUTPATIENT
Start: 2018-07-30 | End: 2018-10-31 | Stop reason: SDUPTHER

## 2018-07-30 RX ORDER — GLIPIZIDE 5 MG/1
TABLET ORAL
Qty: 60 TABLET | Refills: 2 | Status: SHIPPED | OUTPATIENT
Start: 2018-07-30 | End: 2018-10-31 | Stop reason: SDUPTHER

## 2018-07-30 RX ORDER — PEN NEEDLE, DIABETIC 32 GX 1/4"
NEEDLE, DISPOSABLE MISCELLANEOUS
Qty: 100 EACH | Refills: 2 | Status: SHIPPED | OUTPATIENT
Start: 2018-07-30 | End: 2018-10-31 | Stop reason: SDUPTHER

## 2018-07-30 RX ORDER — ATORVASTATIN CALCIUM 80 MG/1
TABLET, FILM COATED ORAL
Qty: 30 TABLET | Refills: 2 | Status: SHIPPED | OUTPATIENT
Start: 2018-07-30 | End: 2018-10-31 | Stop reason: SDUPTHER

## 2018-07-30 RX ORDER — DIPHENHYDRAMINE HYDROCHLORIDE 25 MG/1
CAPSULE ORAL
Qty: 30 CAPSULE | Refills: 2 | Status: SHIPPED | OUTPATIENT
Start: 2018-07-30 | End: 2018-10-31 | Stop reason: SDUPTHER

## 2018-07-30 NOTE — TELEPHONE ENCOUNTER
abdominal pain     SBO (small bowel obstruction)     Sigmoid stricture     Uncontrolled type 2 diabetes mellitus with hyperglycemia, with long-term current use of insulin (HCC)     Hyperglycemia     MIKY (obstructive sleep apnea)     Atelectasis     Hypocalcemia     Vitamin D deficiency     Acute cystitis with hematuria     Hypokalemia     Colon obstruction

## 2018-08-20 NOTE — TELEPHONE ENCOUNTER
embolism (HCC)     Essential hypertension     Chronic back pain     Narcotic abuse     Generalized abdominal pain     SBO (small bowel obstruction) (HCC)     Sigmoid stricture (HCC)     Uncontrolled type 2 diabetes mellitus with hyperglycemia, with long-term current use of insulin (HCC)     Hyperglycemia     MIKY (obstructive sleep apnea)     Atelectasis     Hypocalcemia     Vitamin D deficiency     Acute cystitis with hematuria     Hypokalemia     Colon obstruction ([address]

## 2018-08-21 RX ORDER — NYSTATIN 100000 [USP'U]/G
POWDER TOPICAL
Qty: 1 BOTTLE | Refills: 0 | Status: SHIPPED | OUTPATIENT
Start: 2018-08-21 | End: 2019-03-21 | Stop reason: ALTCHOICE

## 2018-08-25 ENCOUNTER — HOSPITAL ENCOUNTER (EMERGENCY)
Age: 58
Discharge: HOME | End: 2018-08-25
Attending: EMERGENCY MEDICINE
Payer: COMMERCIAL

## 2018-08-25 VITALS
TEMPERATURE: 97.9 F | OXYGEN SATURATION: 97 % | HEART RATE: 83 BPM | DIASTOLIC BLOOD PRESSURE: 72 MMHG | RESPIRATION RATE: 16 BRPM | SYSTOLIC BLOOD PRESSURE: 110 MMHG

## 2018-08-25 DIAGNOSIS — L30.4 INTERTRIGO: Primary | ICD-10-CM

## 2018-08-25 PROCEDURE — 99282 EMERGENCY DEPT VISIT SF MDM: CPT

## 2018-08-25 PROCEDURE — 6370000000 HC RX 637 (ALT 250 FOR IP): Performed by: STUDENT IN AN ORGANIZED HEALTH CARE EDUCATION/TRAINING PROGRAM

## 2018-08-25 RX ORDER — CEPHALEXIN 500 MG/1
500 CAPSULE ORAL ONCE
Status: COMPLETED | OUTPATIENT
Start: 2018-08-25 | End: 2018-08-25

## 2018-08-25 RX ORDER — ACETAMINOPHEN 325 MG/1
325 TABLET ORAL EVERY 6 HOURS PRN
Qty: 120 TABLET | Refills: 3 | Status: SHIPPED | OUTPATIENT
Start: 2018-08-25 | End: 2019-05-31 | Stop reason: SDUPTHER

## 2018-08-25 RX ORDER — IBUPROFEN 800 MG/1
800 TABLET ORAL EVERY 6 HOURS PRN
Qty: 21 TABLET | Refills: 0 | Status: SHIPPED | OUTPATIENT
Start: 2018-08-25 | End: 2018-11-20 | Stop reason: ALTCHOICE

## 2018-08-25 RX ORDER — CEPHALEXIN 500 MG/1
500 CAPSULE ORAL 4 TIMES DAILY
Qty: 28 CAPSULE | Refills: 0 | Status: SHIPPED | OUTPATIENT
Start: 2018-08-25 | End: 2018-09-01

## 2018-08-25 RX ORDER — FLUCONAZOLE 100 MG/1
100 TABLET ORAL
Qty: 3 TABLET | Refills: 0 | Status: SHIPPED | OUTPATIENT
Start: 2018-09-01 | End: 2018-09-16

## 2018-08-25 RX ORDER — HYDROCODONE BITARTRATE AND ACETAMINOPHEN 5; 325 MG/1; MG/1
1 TABLET ORAL ONCE
Status: COMPLETED | OUTPATIENT
Start: 2018-08-25 | End: 2018-08-25

## 2018-08-25 RX ORDER — FLUCONAZOLE 50 MG/1
100 TABLET ORAL ONCE
Status: COMPLETED | OUTPATIENT
Start: 2018-08-25 | End: 2018-08-25

## 2018-08-25 RX ORDER — FLUCONAZOLE 100 MG/1
100 TABLET ORAL
Qty: 3 TABLET | Refills: 0 | Status: SHIPPED | OUTPATIENT
Start: 2018-09-01 | End: 2018-08-25

## 2018-08-25 RX ADMIN — HYDROCODONE BITARTRATE AND ACETAMINOPHEN 1 TABLET: 5; 325 TABLET ORAL at 10:12

## 2018-08-25 RX ADMIN — CEPHALEXIN 500 MG: 500 CAPSULE ORAL at 10:12

## 2018-08-25 RX ADMIN — FLUCONAZOLE 100 MG: 50 TABLET ORAL at 10:12

## 2018-08-25 ASSESSMENT — ENCOUNTER SYMPTOMS
NAUSEA: 0
CHEST TIGHTNESS: 0
SHORTNESS OF BREATH: 0
ABDOMINAL PAIN: 0
COLOR CHANGE: 1

## 2018-08-25 ASSESSMENT — PAIN SCALES - GENERAL: PAINLEVEL_OUTOF10: 10

## 2018-08-25 NOTE — ED PROVIDER NOTES
[facility] ED  Emergency Department Encounter  Emergency Medicine Resident     Pt Name: Anuj Cook  MRN: [id number]  [place] 1960  Date of evaluation: 8/25/18  PCP:  Jewel Nick MD    CHIEF COMPLAINT       Chief Complaint   Patient presents with    Rash     under breast and nothing helping       HISTORY OF PRESENT ILLNESS  (Location/Symptom, Timing/Onset, Context/Setting, Quality, Duration, Modifying Factors, Severity.)      Anuj Cook is a 65 y.o. female who presents with Chronic rash under her left breast she reports is painful and is oozing green discharge. She has had recurrent issues with what is thought to be yeast infection under her breasts for which she has used nystatin powder and has tried to keep dry. She came in today because the pain is significantly worse and she is unable to wear a bra. she has a history of type 2 diabetes which she claims has been under good control lately. She is complaining of severe pain, has not tried motrin or tylenol, and is being prescribed tramadol for chronic pain by an outside physician. However, patient denies taking the tramadol because it makes her legs swell. PAST MEDICAL / SURGICAL / SOCIAL / FAMILY HISTORY      has a past medical history of Allergic rhinitis; Arthritis; Chronic back pain; Diverticulitis; GERD (gastroesophageal reflux disease); Headache(784.0); Hyperlipidemia; Hypertension; Knee pain; MDRO (multiple drug resistant organisms) resistance; Narcotic abuse; Obesity; Osteoarthritis; Rectovaginal fistula; Type II or unspecified type diabetes mellitus without mention of complication, not stated as uncontrolled; and Urinary incontinence. has a past surgical history that includes Knee Arthroplasty; Abdomen surgery (5/1/16); ventral hernia repair (057149); Abdomen surgery (01/21/2014); and Abdominal exploration surgery (01/02/2017).      Social History     Social History    Marital status: Single     Spouse Roman Pantoja MD   atorvastatin (LIPITOR) 80 MG tablet TAKE ONE TABLET BY MOUTH ONCE DAILY 7/30/18  Yes Evans Mchugh MD   carvedilol (COREG) 3.125 MG tablet TAKE ONE TABLET BY MOUTH TWICE DAILY 7/30/18  Yes Evans Mchugh MD   dicyclomine (BENTYL) 20 MG tablet Take 1 tablet by mouth 3 times daily as needed (abd pain) 7/16/18  Yes Citlalli Helton MD   ASPIRIN LOW DOSE 81 MG EC tablet TAKE ONE TABLET BY MOUTH DAILY 6/5/18  Yes Najma Del Cid MD   BANOPHEN 25 MG capsule  5/1/18  Yes Historical Provider, MD   insulin lispro (HUMALOG KWIKPEN) 100 UNIT/ML pen Inject 15 Units into the skin 3 times daily (before meals) 5/3/18  Yes Najma Del Cid MD   insulin glargine (BASAGLAR KWIKPEN) 100 UNIT/ML injection pen Inject 54 Units into the skin nightly 5/3/18  Yes Dilip Asif MD   EASY TOUCH LANCETS 30G/TWIST MISC USE AS DIRECTED THREE TIMES DAILY 5/1/18  Yes Najma Del Cid MD   TRUE METRIX BLOOD GLUCOSE TEST strip TEST BLOOD SUGAR DAILY AS DIRECTED 5/1/18  Yes Dilip Asif MD   ULTICARE ALCOHOL SWABS 70 % PADS USE AS DIRECTED 5/1/18  Yes Dilip Asif MD   metFORMIN (GLUCOPHAGE) 1000 MG tablet TAKE ONE TABLET BY MOUTH TWICE DAILY WITH MEALS 5/1/18  Yes Dilip Asif MD   melatonin 5 MG TABS tablet Take 1 tablet by mouth daily 5/1/18  Yes Najma Del Cid MD   clotrimazole-betamethasone (LOTRISONE) 1-0.05 % cream Apply to the vaginal/rectal area bid externally x 4 days then daily for 3 days 4/5/18  Yes Bianca Yap MD   ondansetron (ZOFRAN) 4 MG tablet Take 1 tablet by mouth every 8 hours as needed for Nausea or Vomiting 2/15/18  Yes Nayana Grijalva MD   Nebulizers (DEVIL[employer] TRAVELER NEBULIZER) MISC  10/31/17  Yes Historical Provider, MD   nystatin (MYCOSTATIN) 663788 UNIT/GM powder Apply 3 times daily.  1/10/18  Yes Erik Lunsford, DO   NICOTINE STEP 2 14 MG/24HR PLACE 1 PATCH ONTO THE SKIN DAILY 10/31/17  Yes Najma Del Cid MD   Multiple Vitamins-Minerals capsule by mouth 4 times daily for 7 days, Disp-28 capsule, R-0Print      acetaminophen (TYLENOL) 325 MG tablet Take 1 tablet by mouth every 6 hours as needed for Pain, Disp-120 tablet, R-3Print      ibuprofen (IBU) 800 MG tablet Take 1 tablet by mouth every 6 hours as needed for Pain, Disp-21 tablet, R-0Print      fluconazole (DIFLUCAN) 100 MG tablet Take 1 tablet by mouth every 7 days for 3 doses, Disp-3 tablet, R-0Print             Rikki Laurent MD  Emergency Medicine Resident    (Please note that portions of this note were completed with a voice recognition program.  Efforts were made to edit the dictations but occasionally words are mis-transcribed.)       Rikki Laurent MD  Resident  08/25/18 [id number]

## 2018-08-27 ENCOUNTER — CARE COORDINATION (OUTPATIENT)
Dept: CARE COORDINATION | Age: 58
End: 2018-08-27

## 2018-08-27 NOTE — CARE COORDINATION
Ambulatory Care Coordination Note  8/27/2018  CM Risk Score: 9  Pan Mortality Risk Score:      ACC: Dinah Wright RN    Summary Note: Called and spoke with Josie Guy today for CC follow up and recent ER visit. Asked if she had picked up the prescriptions for the antibiotics. She said she hadn't picked them up yet, because the pharmacy was only open to 1 pm over the weekend. She plans to get them today. She verbalized that she is supposed to take one of them 4x/day and the other weekly for 4 weeks. She has an appt tomorrow with PCP. Asked how her blood sugars are doing. She was 415 this morning. She said they have been running higher. She said she is still taking the basaglar 54 units nightly, but forgets to take the humalog pre meal insulin. CC stated that we will have to work on a plan for her to remember to take her insulin with her meals. Asked her to bring her blood sugar log or glucometer with her to the appt tomorrow. Will re discuss the possibility of nutrition consult or diabetic ed classes. Diabetes Assessment    Medic Alert ID:  No  Meal Planning:  Avoidance of concentrated sweets   How often do you test your blood sugar?:  Daily, Meals, Bedtime   Do you have barriers with adherence to non-pharmacologic self-management interventions?  (Nutrition/Exercise/Self-Monitoring):  No   Have you ever had to go to the ED for symptoms of low blood sugar?:  No       Increase or Decrease trend in Blood Sugars   Do you have hyperglycemia symptoms?:  No   Do you have hypoglycemia symptoms?:  No   Last Blood Sugar Value:  415   Blood Sugar Monitoring Regimen:  Morning Fasting, Before Meals, At Bedtime   Blood Sugar Trends:  Steady Increase       and   General Assessment    Do you have any symptoms that are causing concern?:  Yes  Progression since Onset:  Unchanged  Reported Symptoms:  Rash         Care Coordination Interventions    Program Enrollment:  Rising Risk  Referral from Primary Care Provider: atorvastatin (LIPITOR) 80 MG tablet TAKE ONE TABLET BY MOUTH ONCE DAILY 7/30/18   Evans Coy MD   carvedilol (COREG) 3.125 MG tablet TAKE ONE TABLET BY MOUTH TWICE DAILY 7/30/18   Evans Coy MD   dicyclomine (BENTYL) 20 MG tablet Take 1 tablet by mouth 3 times daily as needed (abd pain) 7/16/18   Kurt Nino MD   ASPIRIN LOW DOSE 81 MG EC tablet TAKE ONE TABLET BY MOUTH DAILY 6/5/18   Fransisca Mendoza MD   BANOPHEN 25 MG capsule  5/1/18   Historical Provider, MD   insulin lispro (HUMALOG KWIKPEN) 100 UNIT/ML pen Inject 15 Units into the skin 3 times daily (before meals) 5/3/18   Dilip Asif MD   insulin glargine (BASAGLAR KWIKPEN) 100 UNIT/ML injection pen Inject 54 Units into the skin nightly 5/3/18   Fransisca Mendoza MD   EASY TOUCH LANCETS 30G/TWIST MISC USE AS DIRECTED THREE TIMES DAILY 5/1/18   Fransisca Mendoza MD   TRUE METRIX BLOOD GLUCOSE TEST strip TEST BLOOD SUGAR DAILY AS DIRECTED 5/1/18   Fransisca Mendoza MD   ULTICARE ALCOHOL SWABS 70 % PADS USE AS DIRECTED 5/1/18   Dilip Asif MD   metFORMIN (GLUCOPHAGE) 1000 MG tablet TAKE ONE TABLET BY MOUTH TWICE DAILY WITH MEALS 5/1/18   Fransisca Mendoza MD   melatonin 5 MG TABS tablet Take 1 tablet by mouth daily 5/1/18   Fransisca Mendoza MD   clotrimazole-betamethasone (LOTRISONE) 1-0.05 % cream Apply to the vaginal/rectal area bid externally x 4 days then daily for 3 days 4/5/18   Bernie Brown MD   ondansetron (ZOFRAN) 4 MG tablet Take 1 tablet by mouth every 8 hours as needed for Nausea or Vomiting 2/15/18   Andrzej Key MD   Nebulizers (DEVILBISS TRAVELER NEBULIZER) MISC  10/31/17   Historical Provider, MD   nystatin (MYCOSTATIN) 368715 UNIT/GM powder Apply 3 times daily.  1/10/18   Chance Gupta DO   NICOTINE STEP 2 14 MG/24HR PLACE 1 PATCH ONTO THE SKIN DAILY 10/31/17   Fransisca Mendoza MD   Multiple Vitamins-Minerals (THERAPEUTIC MULTIVITAMIN-MINERALS) tablet Take 1 tablet by mouth daily 10/27/17 10/27/18  Lia Rodríguez MD   INSULIN SYRINGE .5CC/29G 29G X 1/2\" 0.5 ML MISC 1 each by Does not apply route daily 10/9/17   Carrie Ferrer MD   Respiratory Therapy Supplies (NEBULIZER COMPRESSOR) KIT 1 kit by Does not apply route once for 1 dose 10/9/17 10/9/17  Carrie Ferrer MD   DOCQLACE 100 MG capsule  9/12/17   Historical Provider, MD   gabapentin (NEURONTIN) 600 MG tablet  9/20/17   Historical Provider, MD Avalos Lowers X 5/16\" 0.5 ML MISC  8/24/17   Historical Provider, MD   oxyCODONE HCl (OXY-IR) 10 MG immediate release tablet  7/12/17   Historical Provider, MD   Alcohol Swabs (CURITY ALCOHOL PREPS) 70 % PADS USE AS DIRECTED (THREE TIMES DAILY) 10/5/17   Lia Rodríguez MD   ipratropium-albuterol (DUONEB) 0.5-2.5 (3) MG/3ML SOLN nebulizer solution Inhale 3 mLs into the lungs 4 times daily as needed for Shortness of Breath 5/22/17   Carrie Ferrer MD   Camryn Bridgeton INSULIN SYRINGE 31G X 5/16\" 1 ML MISC USE AS DIRECTED 4/30/17   Dilip Asif MD   vitamin D (ERGOCALCIFEROL) 15894 UNITS capsule Take 1 capsule by mouth once a week 1/4/17   Lauren Crowell MD   Incontinence Supplies MISC Use as needed for incontinence 9/23/16   Carrie Ferrer MD       Future Appointments  Date Time Provider Genna Tran   8/28/2018 10:00 AM Praveen Garner MD [facility] IM [address]   1/10/2019 8:45 AM Reginal Narrow Pamelia Blizzard, DO [facility] OB/Gyn [facility]

## 2018-08-28 ENCOUNTER — OFFICE VISIT (OUTPATIENT)
Dept: INTERNAL MEDICINE | Age: 58
End: 2018-08-28
Payer: COMMERCIAL

## 2018-08-28 ENCOUNTER — CARE COORDINATION (OUTPATIENT)
Dept: CARE COORDINATION | Age: 58
End: 2018-08-28

## 2018-08-28 VITALS
SYSTOLIC BLOOD PRESSURE: 113 MMHG | HEART RATE: 100 BPM | DIASTOLIC BLOOD PRESSURE: 76 MMHG | BODY MASS INDEX: 47.05 KG/M2 | WEIGHT: 265.6 LBS

## 2018-08-28 DIAGNOSIS — Z00.00 HEALTH CARE MAINTENANCE: ICD-10-CM

## 2018-08-28 DIAGNOSIS — Z79.4 UNCONTROLLED TYPE 2 DIABETES MELLITUS WITH HYPERGLYCEMIA, WITH LONG-TERM CURRENT USE OF INSULIN (HCC): Primary | ICD-10-CM

## 2018-08-28 DIAGNOSIS — E87.5 HYPERKALEMIA: ICD-10-CM

## 2018-08-28 DIAGNOSIS — E11.65 UNCONTROLLED TYPE 2 DIABETES MELLITUS WITH HYPERGLYCEMIA, WITH LONG-TERM CURRENT USE OF INSULIN (HCC): Primary | ICD-10-CM

## 2018-08-28 DIAGNOSIS — Z12.11 COLON CANCER SCREENING: ICD-10-CM

## 2018-08-28 DIAGNOSIS — L30.4 INTERTRIGO: ICD-10-CM

## 2018-08-28 LAB — HBA1C MFR BLD: 14 %

## 2018-08-28 PROCEDURE — 4004F PT TOBACCO SCREEN RCVD TLK: CPT | Performed by: STUDENT IN AN ORGANIZED HEALTH CARE EDUCATION/TRAINING PROGRAM

## 2018-08-28 PROCEDURE — 3046F HEMOGLOBIN A1C LEVEL >9.0%: CPT | Performed by: STUDENT IN AN ORGANIZED HEALTH CARE EDUCATION/TRAINING PROGRAM

## 2018-08-28 PROCEDURE — G8417 CALC BMI ABV UP PARAM F/U: HCPCS | Performed by: STUDENT IN AN ORGANIZED HEALTH CARE EDUCATION/TRAINING PROGRAM

## 2018-08-28 PROCEDURE — 3017F COLORECTAL CA SCREEN DOC REV: CPT | Performed by: STUDENT IN AN ORGANIZED HEALTH CARE EDUCATION/TRAINING PROGRAM

## 2018-08-28 PROCEDURE — 99211 OFF/OP EST MAY X REQ PHY/QHP: CPT | Performed by: INTERNAL MEDICINE

## 2018-08-28 PROCEDURE — 83036 HEMOGLOBIN GLYCOSYLATED A1C: CPT | Performed by: STUDENT IN AN ORGANIZED HEALTH CARE EDUCATION/TRAINING PROGRAM

## 2018-08-28 PROCEDURE — 99213 OFFICE O/P EST LOW 20 MIN: CPT | Performed by: STUDENT IN AN ORGANIZED HEALTH CARE EDUCATION/TRAINING PROGRAM

## 2018-08-28 PROCEDURE — 2022F DILAT RTA XM EVC RTNOPTHY: CPT | Performed by: STUDENT IN AN ORGANIZED HEALTH CARE EDUCATION/TRAINING PROGRAM

## 2018-08-28 PROCEDURE — G8427 DOCREV CUR MEDS BY ELIG CLIN: HCPCS | Performed by: STUDENT IN AN ORGANIZED HEALTH CARE EDUCATION/TRAINING PROGRAM

## 2018-08-28 RX ORDER — TRAMADOL HYDROCHLORIDE 50 MG/1
TABLET ORAL
COMMUNITY
Start: 2018-07-30 | End: 2019-03-21

## 2018-08-28 RX ORDER — CLOTRIMAZOLE 1 %
CREAM (GRAM) TOPICAL
Qty: 1 TUBE | Refills: 3 | Status: SHIPPED | OUTPATIENT
Start: 2018-08-28 | End: 2019-01-08 | Stop reason: SDUPTHER

## 2018-08-28 NOTE — PROGRESS NOTES
TRAVELER NEBULIZER) MISC       NICOTINE STEP 2 14 MG/24HR PLACE 1 PATCH ONTO THE SKIN DAILY 30 patch 5    Multiple Vitamins-Minerals (THERAPEUTIC MULTIVITAMIN-MINERALS) tablet Take 1 tablet by mouth daily 30 tablet 11    INSULIN SYRINGE .5CC/29G 29G X 1/2\" 0.5 ML MISC 1 each by Does not apply route daily 100 each 3    DOCQLACE 100 MG capsule       gabapentin (NEURONTIN) 600 MG tablet       TRUEPLUS INSULIN SYRINGE 31G X 5/16\" 0.5 ML MISC       oxyCODONE HCl (OXY-IR) 10 MG immediate release tablet       Alcohol Swabs (CURITY ALCOHOL PREPS) 70 % PADS USE AS DIRECTED (THREE TIMES DAILY) 30 each 5    ipratropium-albuterol (DUONEB) 0.5-2.5 (3) MG/3ML SOLN nebulizer solution Inhale 3 mLs into the lungs 4 times daily as needed for Shortness of Breath 360 mL 5    ULTICARE INSULIN SYRINGE 31G X 5/16\" 1 ML MISC USE AS DIRECTED 30 each 5    vitamin D (ERGOCALCIFEROL) 24504 UNITS capsule Take 1 capsule by mouth once a week 10 capsule 0    Incontinence Supplies MISC Use as needed for incontinence 100 each 5    Respiratory Therapy Supplies (NEBULIZER COMPRESSOR) KIT 1 kit by Does not apply route once for 1 dose 1 kit 0     No current facility-administered medications for this visit.         Social History   Substance Use Topics    Smoking status: Current Every Day Smoker     Packs/day: 0.50     Years: 30.00     Types: Cigarettes    Smokeless tobacco: Never Used    Alcohol use No       Family History   Problem Relation Age of Onset    Diabetes Mother     Cancer Father         COLON, PROSTATE    Heart Disease Paternal Aunt       ________________________________________________________________________  Review of Systems:  CONSTITUTIONAL: Denies: fever, chills  PSYCH: Denies: anxiety, depression  ALLERGIES: Denies: urticaria  EYES: Denies: blurry vision, decreased vision, photophobia  ENT: Denies: sore throat, nasal congestion  CARDIOVASCULAR: Denies: chest pain, dyspnea on exertion  RESPIRATORY: Denies: cough, PANEL:  Lab Results   Component Value Date    CHOL 109 05/27/2015    HDL 32 (L) 05/27/2015    TRIG 230 (H) 12/27/2016     ________________________________________________________________________  Assessment and Plan:  Jaleel Arredondo was seen today for diabetes, hypertension, health maintenance and discuss medications. Diagnoses and all orders for this visit:    Uncontrolled type 2 diabetes mellitus with hyperglycemia, with long-term current use of insulin (HCC)  -     POCT glycosylated hemoglobin (Hb A1C)    Colon cancer screening  -     Joe Mcdonald MD, Gastroenterology [facility]    Health care maintenance  -     Lipid, Fasting; Future  -     TSH With Reflex Ft4; Future    Intertrigo  -     clotrimazole (CLOTRIMAZOLE ANTI-FUNGAL) 1 % cream; Apply topically 2 times daily.  -     Petrolatum-Lanolin-Zinc Oxide 57-16-10 % OINT; Apply 2 Act topically 2 times daily    Uncontrolled type 2 diabetes mellitus with hyperglycemia, with long-term current use of insulin (HCC)  -     Continue lantus 54 U nightly and adjust by 2 U every 2 days until FBG  Is <140 mg/dl. --    continue  Humalog to 15 U TID with meals.      Morbid obesity with BMI of 40.0-44.9, adult ([address]): start fresh program.   ________________________________________________________________________  Follow up and instructions:  · Return in about 1 month (around 9/28/2018). · Jaleel Arredondo received counseling on the following healthy behaviors: nutrition, exercise and medication adherence    · Patient given educational materials - see patient instructions        Tiffany aMk MD      Department of Internal Medicine  [address] [facility]         8/28/2018, 10:41 AM    This note is created with the assistance of a speech-recognition program. While intending to generate a document that actually reflects the content of the visit, the document can still have some mistakes which may not have been identified and corrected by editing.

## 2018-08-28 NOTE — PATIENT INSTRUCTIONS
Your medications for this visit were escribed to your preferred pharmacy. You have been given a lab order no need to schedule you are required to fast for 12 hours so nothing to eat, drink or smoke 12 hours prior to completing lab orders. Please complete these labs prior to your next visit. Your referral for Gastroenterology was sent to [facility] located at  [place] [address]  you may contact the office at [phone] to schedule you apt. Jessie was mailed appt card mailed with next appt.  MS

## 2018-08-30 NOTE — CARE COORDINATION
Ambulatory Care Coordination Note  8/28/2018  CM Risk Score: 9  Pan Mortality Risk Score:      ACC: Hari Beatty, RN    Summary Note: Met with Melinda Stacy briefly after her office visit. She was here for follow up on her diabetes and recent ED visit. She stated that she has not picked up the antibiotic yet, but was planning on getting it when she left here today. She has also been prescribed an antifungal cream to apply. She has redness and blistered areas under her left breast. She states it is very painful and she is not able to wear a bra. Her A1C today is 14, up from 12.1 in April. She said she doesn't always take her insulin, because she isn't home all the time and doesn't have anything to keep her insulin cold while she is out. She was given an ice pack that she can freeze and put in a lunch box to keep her insulin in when away. CC wanted to discuss her diet but she had to leave to  her daughter. Will call next week. Care Coordination Interventions    Program Enrollment:  Rising Risk  Referral from Primary Care Provider:  Yes  Suggested Interventions and Community Resources  Behavorial Health:  Not Started  Diabetes Education:  Not Started  Registered Dietician:  Not Started  Smoking Cessation:  Not Started  Other Therapy Services:  Completed (Comment: Starting [employer])  [employer] Management Tools: In Process         Goals Addressed             Most Recent       Patient Stated     Lose Weight (pt-stated)   On track (8/28/2018)             To Lose Weight     Barriers: overwhelmed by complexity of regimen and lack of education  Plan for overcoming my barriers: work with Care Coordinator; attend Starting Fresh program and Diabetic Education Classes  Confidence: 6/10  Anticipated Goal Completion Date: 7/2018         Other     HEMOGLOBIN A1C < 7.0   14 (8/28/2018)          Prior to Admission medications    Medication Sig Start Date End Date Taking?  Authorizing Provider   traMADol (ULTRAM) 50 MG tablet  7/30/18   Historical Provider, MD   clotrimazole (CLOTRIMAZOLE ANTI-FUNGAL) 1 % cream Apply topically 2 times daily. 8/28/18 9/4/18  Richard Woods MD   Petrolatum-Lanolin-Zinc Oxide 57-16-10 % OINT Apply 2 Act topically 2 times daily 8/28/18   Richard Woods MD   cephALEXin (KEFLEX) 500 MG capsule Take 1 capsule by mouth 4 times daily for 7 days 8/25/18 9/1/18  Richardson Kong MD   acetaminophen (TYLENOL) 325 MG tablet Take 1 tablet by mouth every 6 hours as needed for Pain 8/25/18   Richardson Kong MD   ibuprofen (IBU) 800 MG tablet Take 1 tablet by mouth every 6 hours as needed for Pain 8/25/18   Richardson Kong MD   fluconazole (DIFLUCAN) 100 MG tablet Take 1 tablet by mouth every 7 days for 3 doses 9/1/18 9/16/18  Richardson Kong MD   nystatin (MYCOSTATIN) 355796 UNIT/GM powder Apply topically 4 times daily.  8/21/18   Jermaine Coppola MD   triamcinolone (KENALOG) 0.025 % cream APPLY TO AFFECTED AREA TWICE DAILY 7/30/18   Evans Edgar MD   EASY TOUCH PEN NEEDLES 32G X 6 MM MISC USE TO TAKE BASAGLAR AND HUMALOG INSULIN. 7/30/18   Evans Edgar MD   famotidine (PEPCID) 20 MG tablet TAKE ONE TABLET BY MOUTH TWICE DAILY 7/30/18   Eavns Edgar MD   glipiZIDE (GLUCOTROL) 5 MG tablet TAKE ONE TABLET BY MOUTH TWICE DAILY 7/30/18   Evans Edgar MD   BANOPHEN 25 MG capsule TAKE ONE CAPSULE BY MOUTH TWICE DAILY AS NEEDED FOR ITCHING 7/30/18   Evans Edgar MD   atorvastatin (LIPITOR) 80 MG tablet TAKE ONE TABLET BY MOUTH ONCE DAILY 7/30/18   Evans Edgar MD   carvedilol (COREG) 3.125 MG tablet TAKE ONE TABLET BY MOUTH TWICE DAILY 7/30/18   Evans Edgar MD   dicyclomine (BENTYL) 20 MG tablet Take 1 tablet by mouth 3 times daily as needed (abd pain) 7/16/18   Sherrill Morris MD   ASPIRIN LOW DOSE 81 MG EC tablet TAKE ONE TABLET BY MOUTH DAILY 6/5/18   Gerald Warren MD   insulin lispro (HUMALOG KWIKPEN) 100 UNIT/ML pen Inject 15 Units into the skin 3 times daily (before meals) 5/3/18 Percy Arango MD   ipratropium-albuterol (DUONEB) 0.5-2.5 (3) MG/3ML SOLN nebulizer solution Inhale 3 mLs into the lungs 4 times daily as needed for Shortness of Breath 5/22/17   MD Ezequiel Hernandez Tracey INSULIN SYRINGE 31G X 5/16\" 1 ML MISC USE AS DIRECTED 4/30/17   Mary Vuong MD   vitamin D (ERGOCALCIFEROL) 96715 UNITS capsule Take 1 capsule by mouth once a week 1/4/17   Juanita Alonso MD   Incontinence Supplies MISC Use as needed for incontinence 9/23/16   Mary Vuong MD       Future Appointments  Date Time Provider Genna Tran   10/4/2018 1:00 PM Geena Arevalo MD [address] [place] [address]  [address] [facility]   1/10/2019 8:45 AM Brina Perez DO [address] [place] [address] 305 OB/Gyn [facility]

## 2018-09-28 DIAGNOSIS — M15.9 PRIMARY OSTEOARTHRITIS INVOLVING MULTIPLE JOINTS: ICD-10-CM

## 2018-09-28 RX ORDER — TRAMADOL HYDROCHLORIDE 50 MG/1
50 TABLET ORAL DAILY PRN
Qty: 30 TABLET | Refills: 3 | Status: CANCELLED | OUTPATIENT
Start: 2018-09-28 | End: 2018-10-28

## 2018-09-28 NOTE — TELEPHONE ENCOUNTER
Pt called and stated when she got her meds her Tramadol was not in there please refill Tramadol send to [address] [facility]   Topic Date Due    Pneumococcal med risk (1 of 1 - PPSV23) 12/26/1980    Shingles Vaccine (1 of 2 - 2 Dose Series) 12/26/2011    Colon cancer screen colonoscopy  12/26/2011    Lipid screen  05/27/2016    Flu vaccine (1) 09/01/2018    Diabetic microalbuminuria test  10/05/2018    A1C test (Diabetic or Prediabetic)  11/28/2018    Diabetic retinal exam  01/29/2019    Diabetic foot exam  04/18/2019    Breast cancer screen  06/27/2019    Potassium monitoring  07/16/2019    Creatinine monitoring  07/16/2019    Cervical cancer screen  01/10/2023    DTaP/Tdap/Td vaccine (2 - Td) 09/06/2026    Hepatitis C screen  Completed    HIV screen  Completed             (applicable per patient's age: Cancer Screenings, Depression Screening, Fall Risk Screening, Immunizations)    Hemoglobin A1C (%)   Date Value   08/28/2018 14   04/05/2018 12.1   03/17/2017 5.4     Microalb/Crt.  Ratio (mcg/mg creat)   Date Value   10/05/2017 36 (H)     LDL Cholesterol (mg/dL)   Date Value   05/27/2015 52     AST (U/L)   Date Value   07/16/2018 17     ALT (U/L)   Date Value   07/16/2018 18     BUN (mg/dL)   Date Value   07/16/2018 10      (goal A1C is < 7)   (goal LDL is <100) need 30-50% reduction from baseline     BP Readings from Last 3 Encounters:   08/28/18 113/76   08/25/18 110/72   07/16/18 138/81    (goal /80)      All Future Testing planned in [facility]:  Lab Frequency Next Occurrence   XR FOOT RIGHT (2 VIEWS) Once 09/17/2018   Lipid, Fasting Once 12/05/2018   TSH With Reflex Ft4 Once 12/05/2018   Potassium Once 12/06/2018       Next Visit Date:  Future Appointments  Date Time Provider Genna Tran   10/4/2018 1:00 PM Brad Madrid MD [facility] IM [facility]   1/10/2019 8:45 AM [id number] [address], [facility] OB/Gyn [place]            Patient Active Problem List:     Diverticulitis

## 2018-10-11 ENCOUNTER — OFFICE VISIT (OUTPATIENT)
Dept: INTERNAL MEDICINE | Age: 58
End: 2018-10-11
Payer: COMMERCIAL

## 2018-10-11 VITALS
HEIGHT: 63 IN | SYSTOLIC BLOOD PRESSURE: 124 MMHG | BODY MASS INDEX: 46.78 KG/M2 | WEIGHT: 264 LBS | DIASTOLIC BLOOD PRESSURE: 66 MMHG | HEART RATE: 92 BPM

## 2018-10-11 DIAGNOSIS — E11.65 UNCONTROLLED TYPE 2 DIABETES MELLITUS WITH HYPERGLYCEMIA, WITH LONG-TERM CURRENT USE OF INSULIN (HCC): Primary | ICD-10-CM

## 2018-10-11 DIAGNOSIS — Z79.4 UNCONTROLLED TYPE 2 DIABETES MELLITUS WITH HYPERGLYCEMIA, WITH LONG-TERM CURRENT USE OF INSULIN (HCC): Primary | ICD-10-CM

## 2018-10-11 DIAGNOSIS — E11.8 DM (DIABETES MELLITUS) WITH COMPLICATIONS (HCC): ICD-10-CM

## 2018-10-11 DIAGNOSIS — F51.01 PRIMARY INSOMNIA: ICD-10-CM

## 2018-10-11 LAB — GLUCOSE BLD-MCNC: 282 MG/DL

## 2018-10-11 PROCEDURE — 2022F DILAT RTA XM EVC RTNOPTHY: CPT | Performed by: INTERNAL MEDICINE

## 2018-10-11 PROCEDURE — 82962 GLUCOSE BLOOD TEST: CPT | Performed by: INTERNAL MEDICINE

## 2018-10-11 PROCEDURE — 99211 OFF/OP EST MAY X REQ PHY/QHP: CPT | Performed by: INTERNAL MEDICINE

## 2018-10-11 PROCEDURE — G8417 CALC BMI ABV UP PARAM F/U: HCPCS | Performed by: INTERNAL MEDICINE

## 2018-10-11 PROCEDURE — 99213 OFFICE O/P EST LOW 20 MIN: CPT | Performed by: INTERNAL MEDICINE

## 2018-10-11 PROCEDURE — 3046F HEMOGLOBIN A1C LEVEL >9.0%: CPT | Performed by: INTERNAL MEDICINE

## 2018-10-11 PROCEDURE — 3017F COLORECTAL CA SCREEN DOC REV: CPT | Performed by: INTERNAL MEDICINE

## 2018-10-11 PROCEDURE — 4004F PT TOBACCO SCREEN RCVD TLK: CPT | Performed by: INTERNAL MEDICINE

## 2018-10-11 PROCEDURE — G8484 FLU IMMUNIZE NO ADMIN: HCPCS | Performed by: INTERNAL MEDICINE

## 2018-10-11 PROCEDURE — G8427 DOCREV CUR MEDS BY ELIG CLIN: HCPCS | Performed by: INTERNAL MEDICINE

## 2018-10-11 RX ORDER — NICOTINE 21 MG/24HR
1 PATCH, TRANSDERMAL 24 HOURS TRANSDERMAL EVERY 24 HOURS
Qty: 30 PATCH | Refills: 3 | Status: SHIPPED | OUTPATIENT
Start: 2018-10-11 | End: 2019-03-08 | Stop reason: SDUPTHER

## 2018-10-11 RX ORDER — CHOLECALCIFEROL (VITAMIN D3) 125 MCG
1-2 CAPSULE ORAL NIGHTLY PRN
Qty: 60 TABLET | Refills: 0 | Status: SHIPPED | OUTPATIENT
Start: 2018-10-11 | End: 2019-03-21 | Stop reason: ALTCHOICE

## 2018-10-11 RX ORDER — TRAMADOL HYDROCHLORIDE 50 MG/1
TABLET ORAL
Status: CANCELLED | OUTPATIENT
Start: 2018-10-11

## 2018-10-11 NOTE — PROGRESS NOTES
Refill    HUMALOG KWIKPEN 100 UNIT/ML pen INJECT 15 UNITS INTO THE SKIN 3 TIMES DAILY (BEFORE MEALS) 1 pen 2    ASPIRIN LOW DOSE 81 MG EC tablet TAKE ONE TABLET BY MOUTH DAILY 30 tablet 2    traMADol (ULTRAM) 50 MG tablet       Petrolatum-Lanolin-Zinc Oxide 57-16-10 % OINT Apply 2 Act topically 2 times daily 1 Tube 3    acetaminophen (TYLENOL) 325 MG tablet Take 1 tablet by mouth every 6 hours as needed for Pain 120 tablet 3    ibuprofen (IBU) 800 MG tablet Take 1 tablet by mouth every 6 hours as needed for Pain 21 tablet 0    nystatin (MYCOSTATIN) 851750 UNIT/GM powder Apply topically 4 times daily.  1 Bottle 0    triamcinolone (KENALOG) 0.025 % cream APPLY TO AFFECTED AREA TWICE DAILY 1 g 2    EASY TOUCH PEN NEEDLES 32G X 6 MM MISC USE TO TAKE BASAGLAR AND HUMALOG INSULIN. 100 each 2    famotidine (PEPCID) 20 MG tablet TAKE ONE TABLET BY MOUTH TWICE DAILY 60 tablet 2    glipiZIDE (GLUCOTROL) 5 MG tablet TAKE ONE TABLET BY MOUTH TWICE DAILY 60 tablet 2    BANOPHEN 25 MG capsule TAKE ONE CAPSULE BY MOUTH TWICE DAILY AS NEEDED FOR ITCHING 30 capsule 2    atorvastatin (LIPITOR) 80 MG tablet TAKE ONE TABLET BY MOUTH ONCE DAILY 30 tablet 2    carvedilol (COREG) 3.125 MG tablet TAKE ONE TABLET BY MOUTH TWICE DAILY 60 tablet 2    dicyclomine (BENTYL) 20 MG tablet Take 1 tablet by mouth 3 times daily as needed (abd pain) 120 tablet 0    EASY TOUCH LANCETS 30G/TWIST MISC USE AS DIRECTED THREE TIMES DAILY 100 each 5    TRUE METRIX BLOOD GLUCOSE TEST strip TEST BLOOD SUGAR DAILY AS DIRECTED 100 strip 5    ULTICARE ALCOHOL SWABS 70 % PADS USE AS DIRECTED 100 each 5    metFORMIN (GLUCOPHAGE) 1000 MG tablet TAKE ONE TABLET BY MOUTH TWICE DAILY WITH MEALS 60 tablet 3    clotrimazole-betamethasone (LOTRISONE) 1-0.05 % cream Apply to the vaginal/rectal area bid externally x 4 days then daily for 3 days 1 Tube 1    ondansetron (ZOFRAN) 4 MG tablet Take 1 tablet by mouth every 8 hours as needed for Nausea or Inject 65 Units into the skin nightly  Dispense: 1 pen; Refill: 2      FOLLOW UP:   1 m    1Della Pierre received counseling on the following healthy behaviors: nutrition, exercise, medication adherence and tobacco cessation  2. Patient given educational materials - see patient instructions  3. Discussed use, benefit, and side effects of prescribed medications. Barriers to medication compliance addressed. All patient questions answered. Pt voiced understanding. 4.  Reviewed prior labs and health maintenance  5. Continue current medications, diet and exercise.          Mitzi Valencia MD Internal Medicine Resident  [facility]  10/11/2018  11:33 AM

## 2018-10-11 NOTE — PATIENT INSTRUCTIONS
Medications e-scribed to pharmacy of patient's choice. LABORATORY INSTRUCTIONS    Your doctor has ordered blood or urine testing. You can get this testing done at the Lab located on the first floor of the [facility], or at any other [facility]. Please stop at Main Registration, before going to the lab, as you must be registered first.     Please get this lab done before your next visit. You may NOT eat, drink, smoke, or chew anything before this test for 8 hours. You may still have water. OC-Light hemoccult collection kit given to patient and procedure explained. Return To Clinic 11/20/18. After Visit Summary mailed to the patient along with appointment card and all other paperwork. JF    It is very important for your care that you keep your appointment. If for some reason you are unable to keep your appointment it is equally important that you call our office at [phone] to cancel your appointment and reschedule. Failure to do so may result in your termination from our practice.

## 2018-10-29 ENCOUNTER — CARE COORDINATION (OUTPATIENT)
Dept: CARE COORDINATION | Age: 58
End: 2018-10-29

## 2018-10-31 DIAGNOSIS — E11.65 CONTROLLED TYPE 2 DIABETES MELLITUS WITH HYPERGLYCEMIA, WITH LONG-TERM CURRENT USE OF INSULIN (HCC): ICD-10-CM

## 2018-10-31 DIAGNOSIS — Z79.4 UNCONTROLLED TYPE 2 DIABETES MELLITUS WITH HYPERGLYCEMIA, WITH LONG-TERM CURRENT USE OF INSULIN (HCC): ICD-10-CM

## 2018-10-31 DIAGNOSIS — R21 RASH AND NONSPECIFIC SKIN ERUPTION: ICD-10-CM

## 2018-10-31 DIAGNOSIS — I10 ESSENTIAL HYPERTENSION: ICD-10-CM

## 2018-10-31 DIAGNOSIS — E78.2 MIXED HYPERLIPIDEMIA: ICD-10-CM

## 2018-10-31 DIAGNOSIS — K21.9 GASTROESOPHAGEAL REFLUX DISEASE WITHOUT ESOPHAGITIS: ICD-10-CM

## 2018-10-31 DIAGNOSIS — L23.9 ALLERGIC DERMATITIS: ICD-10-CM

## 2018-10-31 DIAGNOSIS — Z79.4 CONTROLLED TYPE 2 DIABETES MELLITUS WITH HYPERGLYCEMIA, WITH LONG-TERM CURRENT USE OF INSULIN (HCC): ICD-10-CM

## 2018-10-31 DIAGNOSIS — E11.65 UNCONTROLLED TYPE 2 DIABETES MELLITUS WITH HYPERGLYCEMIA, WITH LONG-TERM CURRENT USE OF INSULIN (HCC): ICD-10-CM

## 2018-10-31 RX ORDER — GLIPIZIDE 5 MG/1
TABLET ORAL
Qty: 60 TABLET | Refills: 3 | Status: SHIPPED | OUTPATIENT
Start: 2018-10-31 | End: 2019-03-08 | Stop reason: SDUPTHER

## 2018-10-31 RX ORDER — FAMOTIDINE 20 MG/1
TABLET, FILM COATED ORAL
Qty: 60 TABLET | Refills: 2 | Status: SHIPPED | OUTPATIENT
Start: 2018-10-31 | End: 2019-02-07 | Stop reason: SDUPTHER

## 2018-10-31 RX ORDER — ALCOHOL ANTISEPTIC PADS
PADS, MEDICATED (EA) TOPICAL
Qty: 90 EACH | Refills: 3 | Status: SHIPPED | OUTPATIENT
Start: 2018-10-31 | End: 2019-03-08 | Stop reason: SDUPTHER

## 2018-10-31 RX ORDER — ATORVASTATIN CALCIUM 80 MG/1
TABLET, FILM COATED ORAL
Qty: 30 TABLET | Refills: 3 | Status: SHIPPED | OUTPATIENT
Start: 2018-10-31 | End: 2019-03-08 | Stop reason: SDUPTHER

## 2018-10-31 RX ORDER — PEN NEEDLE, DIABETIC 32 GX 1/4"
NEEDLE, DISPOSABLE MISCELLANEOUS
Qty: 60 EACH | Refills: 3 | Status: SHIPPED | OUTPATIENT
Start: 2018-10-31 | End: 2019-03-08 | Stop reason: SDUPTHER

## 2018-10-31 RX ORDER — LANCETS 30 GAUGE
EACH MISCELLANEOUS
Qty: 60 EACH | Refills: 5 | Status: SHIPPED | OUTPATIENT
Start: 2018-10-31 | End: 2019-05-02 | Stop reason: SDUPTHER

## 2018-10-31 RX ORDER — TRIAMCINOLONE ACETONIDE 0.25 MG/G
CREAM TOPICAL
Qty: 1 TUBE | Refills: 3 | Status: SHIPPED | OUTPATIENT
Start: 2018-10-31 | End: 2019-03-08 | Stop reason: SDUPTHER

## 2018-10-31 RX ORDER — DIPHENHYDRAMINE HYDROCHLORIDE 25 MG/1
CAPSULE ORAL
Qty: 30 CAPSULE | Refills: 0 | Status: SHIPPED | OUTPATIENT
Start: 2018-10-31 | End: 2018-11-29 | Stop reason: SDUPTHER

## 2018-10-31 RX ORDER — CARVEDILOL 3.12 MG/1
TABLET ORAL
Qty: 60 TABLET | Refills: 3 | Status: SHIPPED | OUTPATIENT
Start: 2018-10-31 | End: 2019-03-08 | Stop reason: SDUPTHER

## 2018-10-31 NOTE — TELEPHONE ENCOUNTER
DO [facility] OB/Gyn [facility]            Patient Active Problem List:     Diverticulitis of large intestine with abscess without bleeding     Leukocytosis     Morbid obesity with BMI of 40.0-44.9, adult (HCC)     Wound, open, abdominal wall, anterior     Incisional hernia, without obstruction or gangrene     Pulmonary embolism (HCC)     Essential hypertension     Chronic back pain     Narcotic abuse ([address]     Generalized abdominal pain     SBO (small bowel obstruction) ([address]     Sigmoid stricture ([address]     Uncontrolled type 2 diabetes mellitus with hyperglycemia, with long-term current use of insulin (HCC)     Hyperglycemia     MIKY (obstructive sleep apnea)     Atelectasis     Hypocalcemia     Vitamin D deficiency     Acute cystitis with hematuria     Hypokalemia     Colon obstruction ([address]

## 2018-11-07 ENCOUNTER — TELEPHONE (OUTPATIENT)
Dept: GASTROENTEROLOGY | Age: 58
End: 2018-11-07

## 2018-11-13 ENCOUNTER — TELEPHONE (OUTPATIENT)
Dept: INTERNAL MEDICINE | Age: 58
End: 2018-11-13

## 2018-11-13 NOTE — TELEPHONE ENCOUNTER
Pt given OC-Light Hemoccult test kit 10/11/18; it has not been returned within  4 weeks. PC to pt; LM; letter sent to pt.

## 2018-11-20 ENCOUNTER — HOSPITAL ENCOUNTER (EMERGENCY)
Age: 58
Discharge: HOME | End: 2018-11-20
Attending: EMERGENCY MEDICINE
Payer: COMMERCIAL

## 2018-11-20 ENCOUNTER — APPOINTMENT (OUTPATIENT)
Dept: CT IMAGING | Age: 58
End: 2018-11-20
Payer: COMMERCIAL

## 2018-11-20 VITALS
RESPIRATION RATE: 16 BRPM | OXYGEN SATURATION: 97 % | TEMPERATURE: 98.1 F | HEART RATE: 95 BPM | WEIGHT: 270 LBS | DIASTOLIC BLOOD PRESSURE: 89 MMHG | HEIGHT: 63 IN | BODY MASS INDEX: 47.84 KG/M2 | SYSTOLIC BLOOD PRESSURE: 125 MMHG

## 2018-11-20 DIAGNOSIS — R22.0 SUBMANDIBULAR SWELLING: Primary | ICD-10-CM

## 2018-11-20 DIAGNOSIS — R22.1 SUBMANDIBULAR SWELLING: Primary | ICD-10-CM

## 2018-11-20 LAB
ABSOLUTE EOS #: 0.13 K/UL (ref 0–0.44)
ABSOLUTE IMMATURE GRANULOCYTE: 0.05 K/UL (ref 0–0.3)
ABSOLUTE LYMPH #: 2.17 K/UL (ref 1.1–3.7)
ABSOLUTE MONO #: 0.64 K/UL (ref 0.1–1.2)
ANION GAP SERPL CALCULATED.3IONS-SCNC: 12 MMOL/L (ref 9–17)
BASOPHILS # BLD: 0 % (ref 0–2)
BASOPHILS ABSOLUTE: 0.03 K/UL (ref 0–0.2)
BUN BLDV-MCNC: 8 MG/DL (ref 6–20)
BUN/CREAT BLD: ABNORMAL (ref 9–20)
CALCIUM SERPL-MCNC: 9.2 MG/DL (ref 8.6–10.4)
CHLORIDE BLD-SCNC: 99 MMOL/L (ref 98–107)
CO2: 22 MMOL/L (ref 20–31)
CREAT SERPL-MCNC: 0.73 MG/DL (ref 0.5–0.9)
DIFFERENTIAL TYPE: ABNORMAL
EOSINOPHILS RELATIVE PERCENT: 1 % (ref 1–4)
GFR AFRICAN AMERICAN: >60 ML/MIN
GFR NON-AFRICAN AMERICAN: >60 ML/MIN
GFR SERPL CREATININE-BSD FRML MDRD: ABNORMAL ML/MIN/{1.73_M2}
GFR SERPL CREATININE-BSD FRML MDRD: ABNORMAL ML/MIN/{1.73_M2}
GLUCOSE BLD-MCNC: 339 MG/DL (ref 70–99)
HCT VFR BLD CALC: 40.1 % (ref 36.3–47.1)
HEMOGLOBIN: 12.8 G/DL (ref 11.9–15.1)
IMMATURE GRANULOCYTES: 1 %
LYMPHOCYTES # BLD: 21 % (ref 24–43)
MCH RBC QN AUTO: 27.3 PG (ref 25.2–33.5)
MCHC RBC AUTO-ENTMCNC: 31.9 G/DL (ref 28.4–34.8)
MCV RBC AUTO: 85.5 FL (ref 82.6–102.9)
MONOCYTES # BLD: 6 % (ref 3–12)
NRBC AUTOMATED: 0 PER 100 WBC
PDW BLD-RTO: 13.9 % (ref 11.8–14.4)
PLATELET # BLD: 333 K/UL (ref 138–453)
PLATELET ESTIMATE: ABNORMAL
PMV BLD AUTO: 11.3 FL (ref 8.1–13.5)
POTASSIUM SERPL-SCNC: 3.9 MMOL/L (ref 3.7–5.3)
RBC # BLD: 4.69 M/UL (ref 3.95–5.11)
RBC # BLD: ABNORMAL 10*6/UL
SEG NEUTROPHILS: 71 % (ref 36–65)
SEGMENTED NEUTROPHILS ABSOLUTE COUNT: 7.58 K/UL (ref 1.5–8.1)
SODIUM BLD-SCNC: 133 MMOL/L (ref 135–144)
WBC # BLD: 10.6 K/UL (ref 3.5–11.3)
WBC # BLD: ABNORMAL 10*3/UL

## 2018-11-20 PROCEDURE — 96374 THER/PROPH/DIAG INJ IV PUSH: CPT

## 2018-11-20 PROCEDURE — 85025 COMPLETE CBC W/AUTO DIFF WBC: CPT

## 2018-11-20 PROCEDURE — 99284 EMERGENCY DEPT VISIT MOD MDM: CPT

## 2018-11-20 PROCEDURE — 6370000000 HC RX 637 (ALT 250 FOR IP): Performed by: EMERGENCY MEDICINE

## 2018-11-20 PROCEDURE — 70492 CT SFT TSUE NCK W/O & W/DYE: CPT

## 2018-11-20 PROCEDURE — 80048 BASIC METABOLIC PNL TOTAL CA: CPT

## 2018-11-20 PROCEDURE — 6360000004 HC RX CONTRAST MEDICATION: Performed by: EMERGENCY MEDICINE

## 2018-11-20 PROCEDURE — 6360000002 HC RX W HCPCS: Performed by: EMERGENCY MEDICINE

## 2018-11-20 RX ORDER — CLINDAMYCIN HYDROCHLORIDE 150 MG/1
450 CAPSULE ORAL ONCE
Status: COMPLETED | OUTPATIENT
Start: 2018-11-20 | End: 2018-11-20

## 2018-11-20 RX ORDER — OXYCODONE HYDROCHLORIDE AND ACETAMINOPHEN 5; 325 MG/1; MG/1
1 TABLET ORAL EVERY 6 HOURS PRN
Qty: 12 TABLET | Refills: 0 | Status: SHIPPED | OUTPATIENT
Start: 2018-11-20 | End: 2018-11-23

## 2018-11-20 RX ORDER — CLINDAMYCIN HYDROCHLORIDE 150 MG/1
450 CAPSULE ORAL 3 TIMES DAILY
Qty: 90 CAPSULE | Refills: 0 | Status: SHIPPED | OUTPATIENT
Start: 2018-11-20 | End: 2018-11-30

## 2018-11-20 RX ORDER — IBUPROFEN 400 MG/1
400 TABLET ORAL EVERY 6 HOURS PRN
Qty: 120 TABLET | Refills: 0 | Status: SHIPPED | OUTPATIENT
Start: 2018-11-20 | End: 2019-10-24 | Stop reason: SDUPTHER

## 2018-11-20 RX ORDER — MORPHINE SULFATE 4 MG/ML
4 INJECTION, SOLUTION INTRAMUSCULAR; INTRAVENOUS ONCE
Status: COMPLETED | OUTPATIENT
Start: 2018-11-20 | End: 2018-11-20

## 2018-11-20 RX ADMIN — MORPHINE SULFATE 4 MG: 4 INJECTION INTRAVENOUS at 12:41

## 2018-11-20 RX ADMIN — CLINDAMYCIN HYDROCHLORIDE 450 MG: 150 CAPSULE ORAL at 15:17

## 2018-11-20 RX ADMIN — IOPAMIDOL 75 ML: 755 INJECTION, SOLUTION INTRAVENOUS at 14:01

## 2018-11-20 ASSESSMENT — PAIN DESCRIPTION - DESCRIPTORS: DESCRIPTORS: ACHING;THROBBING

## 2018-11-20 ASSESSMENT — PAIN DESCRIPTION - PAIN TYPE: TYPE: ACUTE PAIN

## 2018-11-20 ASSESSMENT — ENCOUNTER SYMPTOMS
TROUBLE SWALLOWING: 0
COLOR CHANGE: 0
NAUSEA: 0
BACK PAIN: 0
CONSTIPATION: 0
RHINORRHEA: 0
VOICE CHANGE: 0
COUGH: 0
PHOTOPHOBIA: 0
SHORTNESS OF BREATH: 0
FACIAL SWELLING: 1
VOMITING: 0
DIARRHEA: 0
ABDOMINAL PAIN: 0

## 2018-11-20 ASSESSMENT — PAIN SCALES - GENERAL
PAINLEVEL_OUTOF10: 10
PAINLEVEL_OUTOF10: 10

## 2018-11-20 ASSESSMENT — PAIN DESCRIPTION - ORIENTATION: ORIENTATION: RIGHT

## 2018-11-20 ASSESSMENT — PAIN DESCRIPTION - LOCATION: LOCATION: JAW

## 2018-11-20 ASSESSMENT — PAIN DESCRIPTION - FREQUENCY: FREQUENCY: CONTINUOUS

## 2018-11-20 NOTE — ED PROVIDER NOTES
status: Single     Spouse name: N/A    Number of children: N/A    Years of education: N/A     Occupational History    Not on file. Social History Main Topics    Smoking status: Current Every Day Smoker     Packs/day: 0.50     Years: 30.00     Types: Cigarettes    Smokeless tobacco: Never Used    Alcohol use No    Drug use: No    Sexual activity: No     Other Topics Concern    Not on file     Social History Narrative    No narrative on file       Family History   Problem Relation Age of Onset    Diabetes Mother     Cancer Father         COLON, PROSTATE    Heart Disease Paternal Aunt        Allergies:  Lisinopril; Ace inhibitors; Ciprofloxacin; Metaxalone; Other; and Penicillins    Home Medications:  Prior to Admission medications    Medication Sig Start Date End Date Taking? Authorizing Provider   clindamycin (CLEOCIN) 150 MG capsule Take 3 capsules by mouth 3 times daily for 10 days 11/20/18 11/30/18 Yes Ruthy Carcamo MD   oxyCODONE-acetaminophen (PERCOCET) 5-325 MG per tablet Take 1 tablet by mouth every 6 hours as needed for Pain for up to 3 days. Jyoti Ascencio  11/20/18 11/23/18 Yes Ruthy Carcamo MD   ibuprofen (IBU) 400 MG tablet Take 1 tablet by mouth every 6 hours as needed for Pain 11/20/18  Yes Ruthy Carcamo MD   carvedilol (COREG) 3.125 MG tablet TAKE ONE TABLET BY MOUTH TWICE DAILY 10/31/18  Yes Indu Serra MD   glipiZIDE (GLUCOTROL) 5 MG tablet TAKE ONE TABLET BY MOUTH TWICE DAILY 10/31/18  Yes Indu Serra, MD   famotidine (PEPCID) 20 MG tablet TAKE ONE TABLET BY MOUTH TWICE DAILY 10/31/18  Yes Indu Serra, MD   BANOPHEN 25 MG capsule TAKE ONE CAPSULE BY MOUTH TWICE DAILY AS NEEDED FOR ITCHING 10/31/18  Yes Indu Serra MD   EASY TOUCH PEN NEEDLES 32G X 6 MM MISC USE TO TAKE BASAGLAR AND HUMALOG INSULIN 10/31/18  Yes Indu Serra, MD   atorvastatin (LIPITOR) 80 MG tablet TAKE ONE TABLET BY MOUTH ONCE DAILY 10/31/18  Yes Indu Serra MD   EASY TOUCH LANCETS 30G/TWIST MISC USE AS Negative for difficulty urinating and frequency. Musculoskeletal: Positive for neck pain. Negative for back pain and neck stiffness. Skin: Negative for color change, rash and wound. Neurological: Negative for dizziness, weakness and numbness. PHYSICAL EXAM   (up to 7 for level 4, 8 or more for level 5)      INITIAL VITALS:   /89   Pulse 95   Temp 98.1 °F (36.7 °C) (Oral)   Resp 16   Ht 5' 3\" (1.6 m)   Wt 270 lb (122.5 kg)   SpO2 97%   BMI 47.83 kg/m²     Physical Exam   Constitutional: She is oriented to person, place, and time. She appears well-developed and well-nourished. She appears distressed (Uncomfortable due to pain). HENT:   Head: Normocephalic and atraumatic. Right Ear: External ear normal.   Left Ear: External ear normal.   Nose: Nose normal.   Mouth/Throat: Oropharynx is clear and moist.   No sublingual swelling. No swelling of the tongue. No evidence of erythema, edema, drainage from the gingiva. Dentures in place. Eyes: Conjunctivae and EOM are normal. Right eye exhibits no discharge. Left eye exhibits no discharge. Neck: Normal range of motion. No JVD present. Right-sided submandibular swelling with pain to palpation. Pain on manipulation of right pinna. Cardiovascular: Normal rate, regular rhythm, normal heart sounds and intact distal pulses. Exam reveals no gallop and no friction rub. No murmur heard. Pulmonary/Chest: Effort normal and breath sounds normal. No stridor. No respiratory distress. She has no wheezes. Abdominal: Soft. Bowel sounds are normal. She exhibits no distension. There is no tenderness. Musculoskeletal: Normal range of motion. She exhibits no edema, tenderness or deformity. Neurological: She is alert and oriented to person, place, and time. She exhibits normal muscle tone. Skin: Skin is warm and dry. No rash noted. She is not diaphoretic. No erythema.        DIFFERENTIAL  DIAGNOSIS     PLAN (LABS / IMAGING / EKG):  Orders Placed This Encounter   Procedures    CT SOFT TISSUE NECK W WO CONTRAST    CBC Auto Differential    Basic Metabolic Panel       MEDICATIONS ORDERED:  Orders Placed This Encounter   Medications    morphine (PF) injection 4 mg    iopamidol (ISOVUE-370) 76 % injection 75 mL    clindamycin (CLEOCIN) capsule 450 mg    clindamycin (CLEOCIN) 150 MG capsule     Sig: Take 3 capsules by mouth 3 times daily for 10 days     Dispense:  90 capsule     Refill:  0    oxyCODONE-acetaminophen (PERCOCET) 5-325 MG per tablet     Sig: Take 1 tablet by mouth every 6 hours as needed for Pain for up to 3 days. .     Dispense:  12 tablet     Refill:  0    ibuprofen (IBU) 400 MG tablet     Sig: Take 1 tablet by mouth every 6 hours as needed for Pain     Dispense:  120 tablet     Refill:  0       DDX: Submandibular swelling, submandibular gland sialadenitis, periapical tooth abscess, dental caries, deep neck infection, cellulitis     DIAGNOSTIC RESULTS / EMERGENCY DEPARTMENT COURSE / MDM     LABS:  Results for orders placed or performed during the hospital encounter of 11/20/18   CBC Auto Differential   Result Value Ref Range    WBC 10.6 3.5 - 11.3 k/uL    RBC 4.69 3.95 - 5.11 m/uL    Hemoglobin 12.8 11.9 - 15.1 g/dL    Hematocrit 40.1 36.3 - 47.1 %    MCV 85.5 82.6 - 102.9 fL    MCH 27.3 25.2 - 33.5 pg    MCHC 31.9 28.4 - 34.8 g/dL    RDW 13.9 11.8 - 14.4 %    Platelets 142 253 - 090 k/uL    MPV 11.3 8.1 - 13.5 fL    NRBC Automated 0.0 0.0 per 100 WBC    Differential Type NOT REPORTED     Seg Neutrophils 71 (H) 36 - 65 %    Lymphocytes 21 (L) 24 - 43 %    Monocytes 6 3 - 12 %    Eosinophils % 1 1 - 4 %    Basophils 0 0 - 2 %    Immature Granulocytes 1 (H) 0 %    Segs Absolute 7.58 1.50 - 8.10 k/uL    Absolute Lymph # 2.17 1.10 - 3.70 k/uL    Absolute Mono # 0.64 0.10 - 1.20 k/uL    Absolute Eos # 0.13 0.00 - 0.44 k/uL    Basophils # 0.03 0.00 - 0.20 k/uL    Absolute Immature Granulocyte 0.05 0.00 - 0.30 k/uL    WBC Morphology NOT REPORTED unremarkable. The right submandibular gland appears slightly increased in size when compared with the left side. There is subtle inflammatory change within the surrounding soft tissues. Thyroid gland appears unremarkable. LYMPH NODES:  No cervical or supraclavicular lymphadenopathy is seen. SOFT TISSUES:  Mild inflammatory change along the right side of the face near the right submandibular gland. BRAIN/ORBITS/SINUSES:  The visualized portion of the intracranial contents appear unremarkable. The visualized portion of the orbits, paranasal sinuses and mastoid air cells demonstrate no acute abnormality. LUNG APICES/SUPERIOR MEDIASTINUM:  No focal consolidation is seen within the visualized lung apices. No superior mediastinal lymphadenopathy or mass. The visualized portion of the trachea appears unremarkable. BONES:  No aggressive appearing lytic or blastic bony lesion. Mild inflammatory changes along the right side of face near the right submandibular gland which appears slightly enlarged compared to the left side. EKG  None    All EKG's are interpreted by the Emergency Department Physician who either signs or Co-signs this chart in the absence of a cardiologist.    EMERGENCY DEPARTMENT COURSE:    50-year-old female presents to ED with 2 days of right submandibular swelling, pain. Patient states symptoms began suddenly and have worsened since that time. No medications or treatment taken prior to arrival.  Denies recent antibiotic use. States she has history of tooth extraction of upper and lower jaw and denies recent dental problems. Physical exam shows tearful appearing obese 50-year-old female with right-sided submandibular swelling, pain to palpation. No sublingual or tongue swelling. Gingiva appears pink and moist without evidence of infection. Will obtain CT soft tissues of the neck with and without contrast.  CBC, BMP ordered. 4 mg morphine given for symptomatic relief.   We'll continue to monitor and treat. CT indicates mild inflammatory changes along R face around submandibular gland. No evidence of stone, doubt submandibular sialadenitis. Pt updated on imaging results. Due to concern for possible mild underlying infection, will start pt on Clindamycin. Short course of analgesics given for symptomatic relief. Instructions regarding the use of these medications provided at bedside. Pt instructed to follow up with PCP for reevaluation. Return precautions given including worsening swelling, uncontrolled pain, new or concerning symptoms, or as needed. Pt expressed understanding and agreed. Pt remained stable throughout entirety of ED visit while under my care my care. Pt remained stable throughout entirety of ED visit while under my care and was discharged in no acute distress. PROCEDURES:  None    CONSULTS:  None    CRITICAL CARE:  None    FINAL IMPRESSION      1. Submandibular swelling          DISPOSITION / PLAN     DISPOSITION Decision To Discharge 11/20/2018 02:55:56 PM      PATIENT REFERRED TO:  Jesus Villarreal MD  [address] [id number] [phone]    Schedule an appointment as soon as possible for a visit       [facility]  [address] [address]  [phone]  Go to   If symptoms worsen      DISCHARGE MEDICATIONS:  Discharge Medication List as of 11/20/2018  2:59 PM      START taking these medications    Details   clindamycin (CLEOCIN) 150 MG capsule Take 3 capsules by mouth 3 times daily for 10 days, Disp-90 capsule, R-0Print      oxyCODONE-acetaminophen (PERCOCET) 5-325 MG per tablet Take 1 tablet by mouth every 6 hours as needed for Pain for up to 3 days. ., Disp-12 tablet, R-0Print      ibuprofen (IBU) 400 MG tablet Take 1 tablet by mouth every 6 hours as needed for Pain, Disp-120 tablet, R-0Print             Richardson Feliz DO    Emergency Medicine Resident    (Please note that portions of thisnote were completed with a voice

## 2018-11-29 DIAGNOSIS — R21 RASH AND NONSPECIFIC SKIN ERUPTION: ICD-10-CM

## 2018-12-02 RX ORDER — DIPHENHYDRAMINE HYDROCHLORIDE 25 MG/1
CAPSULE ORAL
Qty: 30 CAPSULE | Refills: 3 | Status: SHIPPED | OUTPATIENT
Start: 2018-12-02 | End: 2019-03-21 | Stop reason: ALTCHOICE

## 2018-12-13 ENCOUNTER — CARE COORDINATION (OUTPATIENT)
Dept: CARE COORDINATION | Age: 58
End: 2018-12-13

## 2018-12-13 ENCOUNTER — TELEPHONE (OUTPATIENT)
Dept: INTERNAL MEDICINE | Age: 58
End: 2018-12-13

## 2018-12-14 NOTE — CARE COORDINATION
Ambulatory Care Coordination Note  12/14/2018  CM Risk Score: 9  Pan Mortality Risk Score:      ACC: Jaison Haji RN    Summary Note: attempted to reach CHARLENE [facility] for CC follow up. She was a no show for recent office visit. The man answering the phone stated that she wasn't available but would give her the message that CC called. Will follow up next week, if no call back. Care Coordination Interventions    Program Enrollment:  Rising Risk  Referral from Primary Care Provider:  Yes  Suggested Interventions and Community Resources  Behavorial Health:  Not Started  Diabetes Education:  Not Started  Registered Dietician:  Not Started  Smoking Cessation:  Not Started  Other Therapy Services:  Completed (Comment: Starting [employer])  [employer] Management Tools:  Completed         Goals Addressed     None          Prior to Admission medications    Medication Sig Start Date End Date Taking?  Authorizing Provider   BANOPHEN 25 MG capsule TAKE ONE CAPSULE BY MOUTH TWICE DAILY AS NEEDED FOR ITCHING 12/2/18   José Antonio Galloway MD   ibuprofen (IBU) 400 MG tablet Take 1 tablet by mouth every 6 hours as needed for Pain 11/20/18   Briana Guerrero MD   carvedilol (COREG) 3.125 MG tablet TAKE ONE TABLET BY MOUTH TWICE DAILY 10/31/18   Blank Lopez MD   glipiZIDE (GLUCOTROL) 5 MG tablet TAKE ONE TABLET BY MOUTH TWICE DAILY 10/31/18   Blank Lopez MD   famotidine (PEPCID) 20 MG tablet TAKE ONE TABLET BY MOUTH TWICE DAILY 10/31/18   Blank Lopez MD   EASY TOUCH PEN NEEDLES 32G X 6 MM MISC USE TO TAKE BASAGLAR AND HUMALOG INSULIN 10/31/18   Blank Lopez MD   atorvastatin (LIPITOR) 80 MG tablet TAKE ONE TABLET BY MOUTH ONCE DAILY 10/31/18   Blank Lopez MD   EASY TOUCH LANCETS 30G/TWIST MISC USE AS DIRECTED THREE TIMES DAILY 10/31/18   Blank Lopez MD   triamcinolone (KENALOG) 0.025 % cream APPLY TO AFFECTED AREA TWICE DAILY 10/31/18   Blank Lopez MD   ULTICARE ALCOHOL SWABS 70 % PADS USE AS DIRECTED 10/31/18   Blas Pizarro MD   melatonin 5 MG TABS tablet Take 1-2 tablets by mouth nightly as needed (Difficulty sleeping) 10/11/18   Bee Chavis MD   insulin glargine [facility]) 100 UNIT/ML injection pen Inject 65 Units into the skin nightly 10/11/18   Bee Chavis MD   nicotine (NICODERM CQ) 21 MG/24HR Place 1 patch onto the skin every 24 hours 10/11/18 10/11/19  Bee Chavis MD   HUMALOG KWIKPEN 100 UNIT/ML pen INJECT 15 UNITS INTO THE SKIN 3 TIMES DAILY (BEFORE MEALS) 9/27/18   Blas Pizarro MD   ASPIRIN LOW DOSE 81 MG EC tablet TAKE ONE TABLET BY MOUTH DAILY 9/27/18   Blas Pizarro MD   traMADol [facility] Pouch) 50 MG tablet  7/30/18   Historical MD Greg   Petrolatum-Lanolin-Zinc Oxide 57-16-10 % OINT Apply 2 Act topically 2 times daily 8/28/18   Juanjose Lancaster MD   acetaminophen (TYLENOL) 325 MG tablet Take 1 tablet by mouth every 6 hours as needed for Pain 8/25/18   Nikos Garzon MD   nystatin (MYCOSTATIN) 340763 UNIT/GM powder Apply topically 4 times daily.  8/21/18   Beverly Arechiga MD   dicyclomine (BENTYL) 20 MG tablet Take 1 tablet by mouth 3 times daily as needed (abd pain) 7/16/18   Jennifer Regan MD   TRUE METRIX BLOOD GLUCOSE TEST strip TEST BLOOD SUGAR DAILY AS DIRECTED 5/1/18   Dilip Asif MD   metFORMIN (GLUCOPHAGE) 1000 MG tablet TAKE ONE TABLET BY MOUTH TWICE DAILY WITH MEALS 5/1/18   Sri Patton MD   clotrimazole-betamethasone (LOTRISONE) 1-0.05 % cream Apply to the vaginal/rectal area bid externally x 4 days then daily for 3 days 4/5/18   Sergio Lee MD   ondansetron (ZOFRAN) 4 MG tablet Take 1 tablet by mouth every 8 hours as needed for Nausea or Vomiting 2/15/18   Alice Rainey MD   NICOTINE STEP 2 14 MG/24HR PLACE 1 PATCH ONTO THE SKIN DAILY 10/31/17   Sri Patton MD   Multiple Vitamins-Minerals (THERAPEUTIC MULTIVITAMIN-MINERALS) tablet Take 1 tablet by mouth daily 10/27/17 10/27/18  Sergio Lee MD   INSULIN SYRINGE .5CC/29G 29G X 1/2\" 0.5 ML MISC 1 each by Does not apply route daily 10/9/17   Launa Kehr, MD   DOCQLACE 100 MG capsule  9/12/17   Historical Provider, MD   gabapentin (NEURONTIN) 600 MG tablet  9/20/17   Historical Provider, MD Schuyler Sumner X 5/16\" 0.5 ML MISC  8/24/17   Historical Provider, MD   oxyCODONE HCl (OXY-IR) 10 MG immediate release tablet  7/12/17   Historical Provider, MD   Alcohol Swabs (CURITY ALCOHOL PREPS) 70 % PADS USE AS DIRECTED (THREE TIMES DAILY) 10/5/17   Lore Alvarado MD   ipratropium-albuterol (DUONEB) 0.5-2.5 (3) MG/3ML SOLN nebulizer solution Inhale 3 mLs into the lungs 4 times daily as needed for Shortness of Breath 5/22/17   Launa Kehr, MD   Samantha Lint INSULIN SYRINGE 31G X 5/16\" 1 ML MISC USE AS DIRECTED 4/30/17   Dilip Asif MD   vitamin D (ERGOCALCIFEROL) 79208 UNITS capsule Take 1 capsule by mouth once a week 1/4/17   Min Toussaint MD   Incontinence Supplies MISC Use as needed for incontinence 9/23/16   Launa Kehr, MD       Future Appointments  Date Time Provider Genna Tran   1/10/2019 [address], DO [id number]

## 2018-12-17 ENCOUNTER — TELEPHONE (OUTPATIENT)
Dept: OBGYN | Age: 58
End: 2018-12-17

## 2018-12-21 ENCOUNTER — TELEPHONE (OUTPATIENT)
Dept: INTERNAL MEDICINE | Age: 58
End: 2018-12-21

## 2018-12-21 DIAGNOSIS — R32 URINARY INCONTINENCE, UNSPECIFIED TYPE: ICD-10-CM

## 2019-01-03 ENCOUNTER — CARE COORDINATION (OUTPATIENT)
Dept: CARE COORDINATION | Age: 59
End: 2019-01-03

## 2019-01-08 DIAGNOSIS — E11.65 UNCONTROLLED TYPE 2 DIABETES MELLITUS WITH HYPERGLYCEMIA, WITH LONG-TERM CURRENT USE OF INSULIN (HCC): ICD-10-CM

## 2019-01-08 DIAGNOSIS — E11.8 DM (DIABETES MELLITUS) WITH COMPLICATIONS (HCC): ICD-10-CM

## 2019-01-08 DIAGNOSIS — L30.4 INTERTRIGO: ICD-10-CM

## 2019-01-08 DIAGNOSIS — E11.21 CONTROLLED TYPE 2 DIABETES MELLITUS WITH DIABETIC NEPHROPATHY, WITHOUT LONG-TERM CURRENT USE OF INSULIN (HCC): ICD-10-CM

## 2019-01-08 DIAGNOSIS — Z79.4 UNCONTROLLED TYPE 2 DIABETES MELLITUS WITH HYPERGLYCEMIA, WITH LONG-TERM CURRENT USE OF INSULIN (HCC): ICD-10-CM

## 2019-01-08 RX ORDER — INSULIN LISPRO 100 [IU]/ML
15 INJECTION, SOLUTION INTRAVENOUS; SUBCUTANEOUS
Qty: 15 ML | Refills: 5 | Status: SHIPPED | OUTPATIENT
Start: 2019-01-08 | End: 2019-03-21

## 2019-01-08 RX ORDER — INSULIN GLARGINE 100 [IU]/ML
54 INJECTION, SOLUTION SUBCUTANEOUS NIGHTLY
Qty: 1 PEN | Refills: 5 | Status: SHIPPED | OUTPATIENT
Start: 2019-01-08 | End: 2019-03-21

## 2019-01-08 RX ORDER — THERMOMETER, ELECTRONIC,ORAL
EACH MISCELLANEOUS
Qty: 14.17 G | Refills: 3 | Status: SHIPPED | OUTPATIENT
Start: 2019-01-08 | End: 2019-05-02 | Stop reason: SDUPTHER

## 2019-01-08 RX ORDER — ASPIRIN 81 MG
TABLET, DELAYED RELEASE (ENTERIC COATED) ORAL
Qty: 30 TABLET | Refills: 5 | Status: SHIPPED | OUTPATIENT
Start: 2019-01-08 | End: 2019-07-08 | Stop reason: SDUPTHER

## 2019-01-28 ENCOUNTER — TELEPHONE (OUTPATIENT)
Dept: INTERNAL MEDICINE | Age: 59
End: 2019-01-28

## 2019-02-07 DIAGNOSIS — K21.9 GASTROESOPHAGEAL REFLUX DISEASE WITHOUT ESOPHAGITIS: ICD-10-CM

## 2019-02-08 RX ORDER — FAMOTIDINE 20 MG/1
TABLET, FILM COATED ORAL
Qty: 60 TABLET | Refills: 5 | Status: SHIPPED | OUTPATIENT
Start: 2019-02-08 | End: 2019-08-13 | Stop reason: SDUPTHER

## 2019-03-08 ENCOUNTER — CARE COORDINATION (OUTPATIENT)
Dept: CARE COORDINATION | Age: 59
End: 2019-03-08

## 2019-03-08 DIAGNOSIS — L23.9 ALLERGIC DERMATITIS: ICD-10-CM

## 2019-03-08 DIAGNOSIS — E11.65 UNCONTROLLED TYPE 2 DIABETES MELLITUS WITH HYPERGLYCEMIA, WITH LONG-TERM CURRENT USE OF INSULIN (HCC): ICD-10-CM

## 2019-03-08 DIAGNOSIS — I10 ESSENTIAL HYPERTENSION: ICD-10-CM

## 2019-03-08 DIAGNOSIS — E78.2 MIXED HYPERLIPIDEMIA: ICD-10-CM

## 2019-03-08 DIAGNOSIS — Z79.4 UNCONTROLLED TYPE 2 DIABETES MELLITUS WITH HYPERGLYCEMIA, WITH LONG-TERM CURRENT USE OF INSULIN (HCC): ICD-10-CM

## 2019-03-08 RX ORDER — PEN NEEDLE, DIABETIC 32 GX 1/4"
NEEDLE, DISPOSABLE MISCELLANEOUS
Qty: 30 EACH | Refills: 3 | Status: SHIPPED | OUTPATIENT
Start: 2019-03-08 | End: 2019-10-24 | Stop reason: SDUPTHER

## 2019-03-08 RX ORDER — GLIPIZIDE 5 MG/1
TABLET ORAL
Qty: 60 TABLET | Refills: 3 | Status: SHIPPED | OUTPATIENT
Start: 2019-03-08 | End: 2019-03-21

## 2019-03-08 RX ORDER — ATORVASTATIN CALCIUM 80 MG/1
TABLET, FILM COATED ORAL
Qty: 30 TABLET | Refills: 3 | Status: SHIPPED | OUTPATIENT
Start: 2019-03-08 | End: 2019-07-08 | Stop reason: SDUPTHER

## 2019-03-08 RX ORDER — CARVEDILOL 3.12 MG/1
TABLET ORAL
Qty: 60 TABLET | Refills: 3 | Status: SHIPPED | OUTPATIENT
Start: 2019-03-08 | End: 2019-07-08 | Stop reason: SDUPTHER

## 2019-03-08 RX ORDER — TRIAMCINOLONE ACETONIDE 0.25 MG/G
CREAM TOPICAL
Qty: 1 TUBE | Refills: 3 | Status: SHIPPED | OUTPATIENT
Start: 2019-03-08 | End: 2019-04-18 | Stop reason: SDUPTHER

## 2019-03-08 RX ORDER — ALCOHOL ANTISEPTIC PADS
PADS, MEDICATED (EA) TOPICAL
Qty: 100 EACH | Refills: 3 | Status: SHIPPED | OUTPATIENT
Start: 2019-03-08 | End: 2019-07-08 | Stop reason: SDUPTHER

## 2019-03-08 RX ORDER — NICOTINE 21 MG/24HR
1 PATCH, TRANSDERMAL 24 HOURS TRANSDERMAL EVERY 24 HOURS
Qty: 360 PATCH | Refills: 0 | Status: SHIPPED | OUTPATIENT
Start: 2019-03-08 | End: 2019-10-24 | Stop reason: SDUPTHER

## 2019-03-21 ENCOUNTER — OFFICE VISIT (OUTPATIENT)
Dept: INTERNAL MEDICINE | Age: 59
End: 2019-03-21
Payer: COMMERCIAL

## 2019-03-21 ENCOUNTER — CARE COORDINATION (OUTPATIENT)
Dept: CARE COORDINATION | Age: 59
End: 2019-03-21

## 2019-03-21 ENCOUNTER — HOSPITAL ENCOUNTER (OUTPATIENT)
Age: 59
Setting detail: SPECIMEN
Discharge: HOME | End: 2019-03-21
Payer: COMMERCIAL

## 2019-03-21 VITALS
WEIGHT: 271.6 LBS | SYSTOLIC BLOOD PRESSURE: 125 MMHG | HEIGHT: 63 IN | DIASTOLIC BLOOD PRESSURE: 79 MMHG | HEART RATE: 87 BPM | BODY MASS INDEX: 48.12 KG/M2

## 2019-03-21 DIAGNOSIS — J42 CHRONIC BRONCHITIS, UNSPECIFIED CHRONIC BRONCHITIS TYPE (HCC): ICD-10-CM

## 2019-03-21 DIAGNOSIS — F51.01 PRIMARY INSOMNIA: ICD-10-CM

## 2019-03-21 DIAGNOSIS — E11.65 UNCONTROLLED TYPE 2 DIABETES MELLITUS WITH HYPERGLYCEMIA, WITH LONG-TERM CURRENT USE OF INSULIN (HCC): ICD-10-CM

## 2019-03-21 DIAGNOSIS — E87.5 HYPERKALEMIA: ICD-10-CM

## 2019-03-21 DIAGNOSIS — R21 RASH AND NONSPECIFIC SKIN ERUPTION: ICD-10-CM

## 2019-03-21 DIAGNOSIS — E11.8 DM (DIABETES MELLITUS) WITH COMPLICATIONS (HCC): ICD-10-CM

## 2019-03-21 DIAGNOSIS — Z79.4 UNCONTROLLED TYPE 2 DIABETES MELLITUS WITH HYPERGLYCEMIA, WITH LONG-TERM CURRENT USE OF INSULIN (HCC): ICD-10-CM

## 2019-03-21 DIAGNOSIS — E11.65 UNCONTROLLED TYPE 2 DIABETES MELLITUS WITH HYPERGLYCEMIA, WITH LONG-TERM CURRENT USE OF INSULIN (HCC): Primary | ICD-10-CM

## 2019-03-21 DIAGNOSIS — Z79.4 UNCONTROLLED TYPE 2 DIABETES MELLITUS WITH HYPERGLYCEMIA, WITH LONG-TERM CURRENT USE OF INSULIN (HCC): Primary | ICD-10-CM

## 2019-03-21 LAB
CHOLESTEROL/HDL RATIO: 3.2
CHOLESTEROL: 97 MG/DL
HBA1C MFR BLD: 14 %
HDLC SERPL-MCNC: 30 MG/DL
LDL CHOLESTEROL: 17 MG/DL (ref 0–130)
POTASSIUM SERPL-SCNC: 4 MMOL/L (ref 3.7–5.3)
TRIGL SERPL-MCNC: 250 MG/DL
TSH SERPL DL<=0.05 MIU/L-ACNC: 2.72 MIU/L (ref 0.3–5)
VLDLC SERPL CALC-MCNC: ABNORMAL MG/DL (ref 1–30)

## 2019-03-21 PROCEDURE — 82570 ASSAY OF URINE CREATININE: CPT

## 2019-03-21 PROCEDURE — G8417 CALC BMI ABV UP PARAM F/U: HCPCS | Performed by: INTERNAL MEDICINE

## 2019-03-21 PROCEDURE — G8484 FLU IMMUNIZE NO ADMIN: HCPCS | Performed by: INTERNAL MEDICINE

## 2019-03-21 PROCEDURE — G8926 SPIRO NO PERF OR DOC: HCPCS | Performed by: INTERNAL MEDICINE

## 2019-03-21 PROCEDURE — G8427 DOCREV CUR MEDS BY ELIG CLIN: HCPCS | Performed by: INTERNAL MEDICINE

## 2019-03-21 PROCEDURE — 4004F PT TOBACCO SCREEN RCVD TLK: CPT | Performed by: INTERNAL MEDICINE

## 2019-03-21 PROCEDURE — 3017F COLORECTAL CA SCREEN DOC REV: CPT | Performed by: INTERNAL MEDICINE

## 2019-03-21 PROCEDURE — 3023F SPIROM DOC REV: CPT | Performed by: INTERNAL MEDICINE

## 2019-03-21 PROCEDURE — 82043 UR ALBUMIN QUANTITATIVE: CPT

## 2019-03-21 PROCEDURE — 80061 LIPID PANEL: CPT

## 2019-03-21 PROCEDURE — 99211 OFF/OP EST MAY X REQ PHY/QHP: CPT | Performed by: INTERNAL MEDICINE

## 2019-03-21 PROCEDURE — 84132 ASSAY OF SERUM POTASSIUM: CPT

## 2019-03-21 PROCEDURE — 36415 COLL VENOUS BLD VENIPUNCTURE: CPT

## 2019-03-21 PROCEDURE — 3046F HEMOGLOBIN A1C LEVEL >9.0%: CPT | Performed by: INTERNAL MEDICINE

## 2019-03-21 PROCEDURE — 2022F DILAT RTA XM EVC RTNOPTHY: CPT | Performed by: INTERNAL MEDICINE

## 2019-03-21 PROCEDURE — 99214 OFFICE O/P EST MOD 30 MIN: CPT | Performed by: INTERNAL MEDICINE

## 2019-03-21 PROCEDURE — 84443 ASSAY THYROID STIM HORMONE: CPT

## 2019-03-21 PROCEDURE — 83036 HEMOGLOBIN GLYCOSYLATED A1C: CPT | Performed by: INTERNAL MEDICINE

## 2019-03-21 RX ORDER — IPRATROPIUM BROMIDE AND ALBUTEROL SULFATE 2.5; .5 MG/3ML; MG/3ML
3 SOLUTION RESPIRATORY (INHALATION) 4 TIMES DAILY PRN
Qty: 360 ML | Refills: 5 | Status: SHIPPED | OUTPATIENT
Start: 2019-03-21 | End: 2019-10-24 | Stop reason: SDUPTHER

## 2019-03-21 RX ORDER — ZOLPIDEM TARTRATE 6.25 MG/1
6.25 TABLET, FILM COATED, EXTENDED RELEASE ORAL NIGHTLY PRN
Qty: 30 TABLET | Refills: 0 | Status: CANCELLED | OUTPATIENT
Start: 2019-03-21 | End: 2019-04-20

## 2019-03-21 RX ORDER — DIPHENHYDRAMINE HCL 25 MG
CAPSULE ORAL
Qty: 30 CAPSULE | Refills: 3 | Status: CANCELLED | OUTPATIENT
Start: 2019-03-21

## 2019-03-21 RX ORDER — ZOLPIDEM TARTRATE 5 MG/1
5 TABLET ORAL NIGHTLY PRN
Qty: 5 TABLET | Refills: 0 | Status: SHIPPED | OUTPATIENT
Start: 2019-03-21 | End: 2019-04-04 | Stop reason: SDUPTHER

## 2019-03-21 RX ORDER — DICYCLOMINE HCL 20 MG
20 TABLET ORAL 3 TIMES DAILY PRN
Qty: 120 TABLET | Refills: 0 | Status: SHIPPED | OUTPATIENT
Start: 2019-03-21 | End: 2019-05-02 | Stop reason: SDUPTHER

## 2019-03-21 ASSESSMENT — PATIENT HEALTH QUESTIONNAIRE - PHQ9
1. LITTLE INTEREST OR PLEASURE IN DOING THINGS: 1
SUM OF ALL RESPONSES TO PHQ QUESTIONS 1-9: 2
SUM OF ALL RESPONSES TO PHQ QUESTIONS 1-9: 2
SUM OF ALL RESPONSES TO PHQ9 QUESTIONS 1 & 2: 2
2. FEELING DOWN, DEPRESSED OR HOPELESS: 1

## 2019-03-23 LAB
-: NORMAL
REASON FOR REJECTION: NORMAL
ZZ NTE CLEAN UP: ORDERED TEST: NORMAL
ZZ NTE WITH NAME CLEAN UP: SPECIMEN SOURCE: NORMAL

## 2019-03-29 ENCOUNTER — TELEPHONE (OUTPATIENT)
Dept: INTERNAL MEDICINE | Age: 59
End: 2019-03-29

## 2019-03-29 DIAGNOSIS — F51.01 PRIMARY INSOMNIA: ICD-10-CM

## 2019-03-29 NOTE — TELEPHONE ENCOUNTER
PC from pt stating that she was just seen 3/21/19 by dr Tucker Grace and was placed on Ambien, and was told that he would slowly up the medication until patient is where it needs to be. Pt is asking if the medication can be upped again, please advise       Health Maintenance   Topic Date Due    Pneumococcal 0-64 years at Risk Vaccine (1 of 1 - PPSV23) 12/26/1967    Hepatitis B Vaccine (1 of 3 - Risk 3-dose series) 12/26/1980    Shingles Vaccine (1 of 2) 12/26/2011    Colon cancer screen colonoscopy  12/26/2011    Flu vaccine (1) 09/01/2018    Diabetic microalbuminuria test  10/05/2018    Diabetic retinal exam  01/29/2019    Diabetic foot exam  04/18/2019    A1C test (Diabetic or Prediabetic)  06/21/2019    Breast cancer screen  06/27/2019    Creatinine monitoring  11/20/2019    Lipid screen  03/21/2020    Potassium monitoring  03/21/2020    Cervical cancer screen  01/10/2023    DTaP/Tdap/Td vaccine (2 - Td) 09/06/2026    Hepatitis C screen  Completed    HIV screen  Completed             (applicable per patient's age: Cancer Screenings, Depression Screening, Fall Risk Screening, Immunizations)    Hemoglobin A1C (%)   Date Value   03/21/2019 14.0   08/28/2018 14   04/05/2018 12.1     Microalb/Crt.  Ratio (mcg/mg creat)   Date Value   10/05/2017 36 (H)     LDL Cholesterol (mg/dL)   Date Value   03/21/2019 17     AST (U/L)   Date Value   07/16/2018 17     ALT (U/L)   Date Value   07/16/2018 18     BUN (mg/dL)   Date Value   11/20/2018 8      (goal A1C is < 7)   (goal LDL is <100) need 30-50% reduction from baseline     BP Readings from Last 3 Encounters:   03/21/19 125/79   11/20/18 125/89   10/11/18 124/66    (goal /80)      All Future Testing planned in [facility]:  Lab Frequency Next Occurrence   XR FOOT RIGHT (2 VIEWS) Once 05/03/2019   Lipid, Fasting Once 05/01/2019   TSH With Reflex Ft4 Once 05/01/2019       Next Visit Date:  Future Appointments   Date Time Provider Genna Tran   4/3/2019 1:15 PM Sixto Melgar DPM [facility]   4/18/2019  9:50 AM Juan Ventura MD [facility]   4/25/2019  2:30 PM Nikkie Kraft,  [facility]            Patient Active Problem List:     Diverticulitis of large intestine with abscess without bleeding     Leukocytosis     Morbid obesity with BMI of 40.0-44.9, adult (HCC)     Wound, open, abdominal wall, anterior     Incisional hernia, without obstruction or gangrene     Pulmonary embolism ([facility])     Essential hypertension     Chronic back pain     Narcotic abuse ([address]     Generalized abdominal pain     SBO (small bowel obstruction) ([address]     Sigmoid stricture ([address]     Uncontrolled type 2 diabetes mellitus with hyperglycemia, with long-term current use of insulin (HCC)     Hyperglycemia     MIKY (obstructive sleep apnea)     Atelectasis     Hypocalcemia     Vitamin D deficiency     Acute cystitis with hematuria     Hypokalemia     Colon obstruction ([address]

## 2019-04-03 ENCOUNTER — TELEPHONE (OUTPATIENT)
Dept: PODIATRY | Age: 59
End: 2019-04-03

## 2019-04-04 RX ORDER — ZOLPIDEM TARTRATE 5 MG/1
5 TABLET ORAL NIGHTLY PRN
Qty: 20 TABLET | Refills: 0 | OUTPATIENT
Start: 2019-04-04 | End: 2019-05-02 | Stop reason: SDUPTHER

## 2019-04-04 NOTE — TELEPHONE ENCOUNTER
Script for Camrongennaro Faso called into [facility]-- PC to pt-- lm letting her know script was sent in

## 2019-04-18 ENCOUNTER — CARE COORDINATION (OUTPATIENT)
Dept: CARE COORDINATION | Age: 59
End: 2019-04-18

## 2019-04-18 ENCOUNTER — OFFICE VISIT (OUTPATIENT)
Dept: INTERNAL MEDICINE | Age: 59
End: 2019-04-18
Payer: COMMERCIAL

## 2019-04-18 VITALS
BODY MASS INDEX: 49.08 KG/M2 | WEIGHT: 277 LBS | HEART RATE: 94 BPM | DIASTOLIC BLOOD PRESSURE: 60 MMHG | SYSTOLIC BLOOD PRESSURE: 119 MMHG | HEIGHT: 63 IN

## 2019-04-18 DIAGNOSIS — I10 ESSENTIAL HYPERTENSION: ICD-10-CM

## 2019-04-18 DIAGNOSIS — L23.9 ALLERGIC DERMATITIS: ICD-10-CM

## 2019-04-18 DIAGNOSIS — Z79.4 UNCONTROLLED TYPE 2 DIABETES MELLITUS WITH HYPERGLYCEMIA, WITH LONG-TERM CURRENT USE OF INSULIN (HCC): Primary | ICD-10-CM

## 2019-04-18 DIAGNOSIS — E11.65 UNCONTROLLED TYPE 2 DIABETES MELLITUS WITH HYPERGLYCEMIA, WITH LONG-TERM CURRENT USE OF INSULIN (HCC): Primary | ICD-10-CM

## 2019-04-18 PROCEDURE — 3017F COLORECTAL CA SCREEN DOC REV: CPT | Performed by: INTERNAL MEDICINE

## 2019-04-18 PROCEDURE — 3046F HEMOGLOBIN A1C LEVEL >9.0%: CPT | Performed by: INTERNAL MEDICINE

## 2019-04-18 PROCEDURE — 2022F DILAT RTA XM EVC RTNOPTHY: CPT | Performed by: INTERNAL MEDICINE

## 2019-04-18 PROCEDURE — 4004F PT TOBACCO SCREEN RCVD TLK: CPT | Performed by: INTERNAL MEDICINE

## 2019-04-18 PROCEDURE — 99211 OFF/OP EST MAY X REQ PHY/QHP: CPT | Performed by: INTERNAL MEDICINE

## 2019-04-18 PROCEDURE — G8427 DOCREV CUR MEDS BY ELIG CLIN: HCPCS | Performed by: INTERNAL MEDICINE

## 2019-04-18 PROCEDURE — 99213 OFFICE O/P EST LOW 20 MIN: CPT | Performed by: INTERNAL MEDICINE

## 2019-04-18 PROCEDURE — G8417 CALC BMI ABV UP PARAM F/U: HCPCS | Performed by: INTERNAL MEDICINE

## 2019-04-18 RX ORDER — DIPHENHYDRAMINE HYDROCHLORIDE 12.5 MG/1
12.5 BAR, CHEWABLE ORAL 4 TIMES DAILY PRN
Qty: 20 TABLET | Refills: 0 | Status: SHIPPED | OUTPATIENT
Start: 2019-04-18 | End: 2019-10-24 | Stop reason: SDUPTHER

## 2019-04-18 RX ORDER — TRIAMCINOLONE ACETONIDE 0.25 MG/G
CREAM TOPICAL
Qty: 1 TUBE | Refills: 3 | Status: SHIPPED | OUTPATIENT
Start: 2019-04-18 | End: 2019-10-24 | Stop reason: SDUPTHER

## 2019-04-18 NOTE — PATIENT INSTRUCTIONS
Labs given to patient, they will have them done before their next visit. Medications e-scribe to pharmacy of pt's choice. Follow-up appointment scheduled for   05/16/19   , AVS given to patient.     tv

## 2019-04-18 NOTE — PROGRESS NOTES
[facility]    Progress Note    Date of patient's visit: 4/18/2019  YOB: 1960  Patient's Name:  David Magaña    Patient Care Team:  Jareth Oviedo MD as PCP - General (Internal Medicine)  Ariel Diehl MD as PCP - S Attributed Provider  Vijay Garcia RN as Care Coordinator  Curt Bonilla MD as Consulting Physician (Gastroenterology)    REASON FOR VISIT: Routine outpatient follow     HISTORY OF PRESENT ILLNESS:    History was obtained from the patient. David Magaña is a 63 y.o. is here for a  follow-up for high blood pressure and diabetes. Patient continues to be noncompliant with diet. Last A1c 14. She is currently on Lantus 70 units, Humalog 20 units 3 times a day and metformin thousand twice a day. Insurance has denied Victoza. Referral has been made to endocrinologist.  Patient will need a insulin pump. Her blood pressure is stable today. She smokes about 3 cigarettes per day.     Odilia Gibbons for some Benadryl and Kenalog cream.    Patient Active Problem List   Diagnosis    Diverticulitis of large intestine with abscess without bleeding    Leukocytosis    Morbid obesity with BMI of 40.0-44.9, adult ([address]    Wound, open, abdominal wall, anterior    Incisional hernia, without obstruction or gangrene    Pulmonary embolism ([address]    Essential hypertension    Chronic back pain    Narcotic abuse ([address]    Generalized abdominal pain    SBO (small bowel obstruction) ([address]    Sigmoid stricture ([address]    Uncontrolled type 2 diabetes mellitus with hyperglycemia, with long-term current use of insulin (HCC)    Hyperglycemia    MIKY (obstructive sleep apnea)    Atelectasis    Hypocalcemia    Vitamin D deficiency    Acute cystitis with hematuria    Hypokalemia    Colon obstruction (HCC)       ALLERGIES      Allergies   Allergen Reactions    Lisinopril Swelling    Ace Inhibitors Swelling     ANGIOEDEMA    Ciprofloxacin Hives  Metaxalone     Other Hives and Swelling     seafood    Penicillins Hives and Swelling         MEDICATIONS:      Current Outpatient Medications on File Prior to Visit   Medication Sig Dispense Refill    zolpidem (AMBIEN) 5 MG tablet Take 1 tablet by mouth nightly as needed for Sleep for up to 31 days. 20 tablet 0    ipratropium-albuterol (DUONEB) 0.5-2.5 (3) MG/3ML SOLN nebulizer solution Inhale 3 mLs into the lungs 4 times daily as needed for Shortness of Breath 360 mL 5    dicyclomine (BENTYL) 20 MG tablet Take 1 tablet by mouth 3 times daily as needed (abd pain) 120 tablet 0    insulin lispro (HUMALOG KWIKPEN) 100 UNIT/ML pen Take 3 times ,<200 - 0 units, 201-250 - 4 units, 251-300 - 6 units, 301-350 - 8 units, 351-400 - 10 units, >400 - 12 units. 15 mL 5    insulin glargine (BASAGLAR KWIKPEN) 100 UNIT/ML injection pen Inject 70 Units into the skin nightly 15 pen 3    Liraglutide (VICTOZA) 18 MG/3ML SOPN SC injection Inject 0.6 mg subcutaneously once daily for 7 days then increase it to 1.2 mg daily.  2 pen 3    triamcinolone (KENALOG) 0.025 % cream APPLY TO AFFECTED AREA TWICE DAILY 1 Tube 3    ULTICARE ALCOHOL SWABS 70 % PADS USE AS DIRECTED 100 each 3    carvedilol (COREG) 3.125 MG tablet TAKE ONE TABLET BY MOUTH TWICE DAILY 60 tablet 3    nicotine (NICODERM CQ) 21 MG/24HR PLACE 1 PATCH ONTO THE SKIN EVERY 24 HOURS 360 patch 0    atorvastatin (LIPITOR) 80 MG tablet TAKE ONE TABLET BY MOUTH DAILY 30 tablet 3    EASY TOUCH PEN NEEDLES 32G X 6 MM MISC USE AS DIRECTED 30 each 3    famotidine (PEPCID) 20 MG tablet TAKE ONE TABLET BY MOUTH TWICE DAILY 60 tablet 5    CLOTRIMAZOLE ANTI-FUNGAL 1 % cream APPLY TO AFFECTED AREA TWICE DAILY 14.17 g 3    ASPIRIN LOW DOSE 81 MG EC tablet TAKE ONE TABLET BY MOUTH DAILY 30 tablet 5    Incontinence Supplies MISC Use as needed for incontinence 100 each 5    ibuprofen (IBU) 400 MG tablet Take 1 tablet by mouth every 6 hours as needed for Pain 120 tablet 0  EASY TOUCH LANCETS 30G/TWIST MISC USE AS DIRECTED THREE TIMES DAILY 60 each 5    Petrolatum-Lanolin-Zinc Oxide 57-16-10 % OINT Apply 2 Act topically 2 times daily 1 Tube 3    acetaminophen (TYLENOL) 325 MG tablet Take 1 tablet by mouth every 6 hours as needed for Pain 120 tablet 3    TRUE METRIX BLOOD GLUCOSE TEST strip TEST BLOOD SUGAR DAILY AS DIRECTED 100 strip 5    metFORMIN (GLUCOPHAGE) 1000 MG tablet TAKE ONE TABLET BY MOUTH TWICE DAILY WITH MEALS 60 tablet 3    DOCQLACE 100 MG capsule       Alcohol Swabs (CURITY ALCOHOL PREPS) 70 % PADS USE AS DIRECTED (THREE TIMES DAILY) 30 each 5     No current facility-administered medications on file prior to visit. SOCIAL HISTORY    Reviewed and no change from previous record. Radha Awan  reports that she has been smoking cigarettes. She has a 15.00 pack-year smoking history. She has never used smokeless tobacco.    FAMILY HISTORY:    Reviewed and No change from previous visit    REVIEW OF SYSTEMS:    ENT: negative  Respiratory: no cough, shortness of breath, or wheezing  Cardiovascular: no chest pain or dyspnea on exertion  Gastrointestinal: no abdominal pain, black or bloody stools  Neurological: no TIA or stroke symptoms  Endocrine: negative  Genito-Urinary: no dysuria, trouble voiding, or hematuria  Musculoskeletal: negative  Dermatological: negative    PHYSICAL EXAM:      Vitals:    04/18/19 0957   BP: (!) 147/84   Pulse: 94     Physical Exam   Constitutional: She appears well-nourished. HENT:   Head: Atraumatic. Eyes: Pupils are equal, round, and reactive to light. Neck: Normal range of motion. Cardiovascular: Normal rate. Pulmonary/Chest: Effort normal.   Abdominal: Soft. Neurological: She is alert. Skin: Skin is warm. Psychiatric: She has a normal mood and affect.          LABORATORY FINDINGS:    CBC:  Lab Results   Component Value Date    WBC 10.6 11/20/2018    HGB 12.8 11/20/2018     11/20/2018     BMP:    Lab Results

## 2019-04-18 NOTE — PROGRESS NOTES
DIABETES and HYPERTENSION visit    BP Readings from Last 3 Encounters:   03/21/19 125/79   11/20/18 125/89   10/11/18 124/66        Hemoglobin A1C (%)   Date Value   03/21/2019 14.0   08/28/2018 14   04/05/2018 12.1     Microalb/Crt. Ratio (mcg/mg creat)   Date Value   10/05/2017 36 (H)     LDL Cholesterol (mg/dL)   Date Value   03/21/2019 17     HDL (mg/dL)   Date Value   03/21/2019 30 (L)     BUN (mg/dL)   Date Value   11/20/2018 8     CREATININE (mg/dL)   Date Value   11/20/2018 0.73     Glucose (mg/dL)   Date Value   11/20/2018 339 (H)            Have you changed or started any medications since your last visit including any over-the-counter medicines, vitamins, or herbal medicines? no   Have you stopped taking any of your medications? Is so, why? -  no  Are you having any side effects from any of your medications? - no    Have you seen any other physician or provider since your last visit?  no   Have you had any other diagnostic tests since your last visit?  no   Have you been seen in the emergency room and/or had an admission in a hospital since we last saw you?  no   Have you had your routine dental cleaning in the past 6 months?  no     Have you had your annual diabetic retinal (eye) exam? No   (ensure copy of exam is in the chart)    Do you have an active MyChart account? If no, what is the barrier?   No:     Patient Care Team:  Kamilah Madrid MD as PCP - General (Internal Medicine)  Malika Betancur MD as PCP - S Attributed Provider  Kristin Metzger RN as Care Coordinator  Yuki Taylor MD as Consulting Physician (Gastroenterology)    Medical History Review  Past Medical, Family, and Social History reviewed and does not contribute to the patient presenting condition    Health Maintenance   Topic Date Due    Pneumococcal 0-64 years Vaccine (1 of 1 - PPSV23) 12/26/1967    Hepatitis B Vaccine (1 of 3 - Risk 3-dose series) 12/26/1980    Shingles Vaccine (1 of 2) 12/26/2011    Colon cancer screen colonoscopy  12/26/2011    Diabetic microalbuminuria test  10/05/2018    Diabetic retinal exam  01/29/2019    Diabetic foot exam  04/18/2019    A1C test (Diabetic or Prediabetic)  06/21/2019    Breast cancer screen  06/27/2019    Flu vaccine (Season Ended) 09/01/2019    Creatinine monitoring  11/20/2019    Lipid screen  03/21/2020    Potassium monitoring  03/21/2020    Cervical cancer screen  01/10/2023    DTaP/Tdap/Td vaccine (2 - Td) 09/06/2026    Hepatitis C screen  Completed    HIV screen  Completed

## 2019-04-18 NOTE — PROGRESS NOTES
Attending Physician Statement GE  I have discussed the care of Jacey Nieves, including pertinent history and exam findings with the resident. I have reviewed the key elements of all parts of the encounter with the resident. Patient here for 1 month follow up on DM  DM- currently on Lantus 70 U  Humalog 20 TID  metformin  Was prescribed victoza but insurance did not cover it  Was referred to endocrinology in the past, did not see them yet  Per patient glucose between 100-170  Will continue current regimen  Check microalbumin  Sees Podiatry    Continue nicotine patch for smoking cessation    Eczematous rash- refill topical triamcinolone    Has already been referred for colonoscopy    Return in about 1 month (around 5/16/2019).         Sen Oconnor MD

## 2019-04-22 NOTE — CARE COORDINATION
Ambulatory Care Coordination Note  4/18/2019  CM Risk Score: 9  Pan Mortality Risk Score:      ACC: Edward Griffin RN    Summary Note: met with patient during office visit today. She states her sugars have been in the 300s. She is taking her medications. She said it is her diet and she knows why they are elevated. She wants to take the Starting Fresh classes. Gave her dates and times for the next class, starting 4/25/19. Sent message to Arelis Arenas to make sure he had her on the list. Discussed diet and portion sizes. She said she used to have a plate, with dividers, that she used for her meals which helped her a lot. She said she was able to get her A1C down to 7.5. She said she got it from the diabetic educator. She wants to see about getting another one. Will check to see where she might be able to get one. She talked about stressors at home. She has the only running vehicle in the house and everyone is asking her to run them wherever. CC suggested she set some limits so she can focus on taking care of herself. Suggested she let her family know that every Thursday for the next six weeks, is her time to focus on her health. PLAN:  Will follow up in 3 weeks to review blood sugars. Will verify if she is attending the starting fresh classes. Will ask about diabetic foot and eye exams. Care Coordination Interventions    Program Enrollment:  Complex Care  Referral from Primary Care Provider:  Yes  Suggested Interventions and Community Resources  [facility]  Not Started  Diabetes Education:  Declined  Registered Dietician:  Declined  Smoking Cessation:  Not Started  Other Therapy Services:  Completed (Comment: Starting [employer])  Zone Management Tools:  Completed         Goals Addressed                 This Visit's Progress     Wellness Goal   Improving     Patient Self-Management Goal for Health Maintenance  Goal: I will follow a healthy diet as discussed by my provider.   I will schedule Tiffany Casas MD   Liraglutide (VICTOZA) 18 MG/3ML SOPN SC injection Inject 0.6 mg subcutaneously once daily for 7 days then increase it to 1.2 mg daily.  3/21/19   Clara Pierce MD   ULTICARE ALCOHOL SWABS 70 % PADS USE AS DIRECTED 3/8/19   Krista Jurado MD   carvedilol (COREG) 3.125 MG tablet TAKE ONE TABLET BY MOUTH TWICE DAILY 3/8/19   Krista Jurado MD   nicotine (NICODERM CQ) 21 MG/24HR PLACE 1 PATCH ONTO THE SKIN EVERY 24 HOURS 3/8/19 3/7/20  Krista Jurado MD   atorvastatin (LIPITOR) 80 MG tablet TAKE ONE TABLET BY MOUTH DAILY 3/8/19   Leno Marcos MD   EASY TOUCH PEN NEEDLES 32G X 6 MM MISC USE AS DIRECTED 3/8/19   Krista Jurado MD   famotidine (PEPCID) 20 MG tablet TAKE ONE TABLET BY MOUTH TWICE DAILY 2/8/19   Leno Marcos MD   CLOTRIMAZOLE ANTI-FUNGAL 1 % cream APPLY TO AFFECTED AREA TWICE DAILY 1/8/19   Leno Marcos MD   ASPIRIN LOW DOSE 81 MG EC tablet TAKE ONE TABLET BY MOUTH DAILY 1/8/19   Krista Jurado MD   Incontinence Supplies MISC Use as needed for incontinence 12/27/18   Krista Jurado MD   ibuprofen (IBU) 400 MG tablet Take 1 tablet by mouth every 6 hours as needed for Pain 11/20/18   Vanessa Cardona MD   EASY TOUCH LANCETS 30G/TWIST MISC USE AS DIRECTED THREE TIMES DAILY 10/31/18   Hermelinda Sewell MD   Petrolatum-Lanolin-Zinc Oxide 57-16-10 % OINT Apply 2 Act topically 2 times daily 8/28/18   Dallin Pimentel MD   acetaminophen (TYLENOL) 325 MG tablet Take 1 tablet by mouth every 6 hours as needed for Pain 8/25/18   Ivania Issa MD   TRUE METRIX BLOOD GLUCOSE TEST strip TEST BLOOD SUGAR DAILY AS DIRECTED 5/1/18   Dilip Asif MD   metFORMIN (GLUCOPHAGE) 1000 MG tablet TAKE ONE TABLET BY MOUTH TWICE DAILY WITH MEALS 5/1/18   Tremayne Christian MD   DOCQLACE 100 MG capsule  9/12/17   Sonny Garza MD   Alcohol Swabs (CURITY ALCOHOL PREPS) 70 % PADS USE AS DIRECTED (THREE TIMES DAILY) 10/5/17   Simone Calixto MD       Future Appointments   Date Time Provider Genna Chelsea   4/25/2019  2:30 PM Birdie Gomez DO [facility]   5/16/2019  9:30 AM Heide Gordillo MD [facility]      and   Diabetes Assessment    Medic Alert ID:  No  Meal Planning:  Avoidance of concentrated sweets   How often do you test your blood sugar?:  Daily, Meals, Bedtime   Do you have barriers with adherence to non-pharmacologic self-management interventions?  (Nutrition/Exercise/Self-Monitoring):  No   Have you ever had to go to the ED for symptoms of low blood sugar?:  No       Increase or Decrease trend in Blood Sugars, No patient-reported symptoms   Do you have hyperglycemia symptoms?:  No   Do you have hypoglycemia symptoms?:  No   Last Blood Sugar Value:  300   Blood Sugar Monitoring Regimen:  Once a Day   Blood Sugar Trends:  Fluctuating

## 2019-05-02 DIAGNOSIS — L30.4 INTERTRIGO: ICD-10-CM

## 2019-05-02 DIAGNOSIS — Z79.4 CONTROLLED TYPE 2 DIABETES MELLITUS WITH HYPERGLYCEMIA, WITH LONG-TERM CURRENT USE OF INSULIN (HCC): ICD-10-CM

## 2019-05-02 DIAGNOSIS — F51.01 PRIMARY INSOMNIA: ICD-10-CM

## 2019-05-02 DIAGNOSIS — E11.65 CONTROLLED TYPE 2 DIABETES MELLITUS WITH HYPERGLYCEMIA, WITH LONG-TERM CURRENT USE OF INSULIN (HCC): ICD-10-CM

## 2019-05-02 RX ORDER — DICYCLOMINE HCL 20 MG
TABLET ORAL
Qty: 120 TABLET | Refills: 0 | Status: SHIPPED | OUTPATIENT
Start: 2019-05-02 | End: 2019-07-08 | Stop reason: SDUPTHER

## 2019-05-02 RX ORDER — ZOLPIDEM TARTRATE 5 MG/1
TABLET ORAL
Qty: 20 TABLET | Refills: 0 | OUTPATIENT
Start: 2019-05-02 | End: 2019-05-22

## 2019-05-02 RX ORDER — THERMOMETER, ELECTRONIC,ORAL
EACH MISCELLANEOUS
Qty: 14.17 G | Refills: 3 | Status: SHIPPED | OUTPATIENT
Start: 2019-05-02 | End: 2019-09-06 | Stop reason: SDUPTHER

## 2019-05-02 RX ORDER — GLUCOSAM/CHON-MSM1/C/MANG/BOSW 500-416.6
TABLET ORAL
Qty: 100 EACH | Refills: 5 | Status: SHIPPED | OUTPATIENT
Start: 2019-05-02 | End: 2019-10-24 | Stop reason: SDUPTHER

## 2019-05-02 NOTE — TELEPHONE ENCOUNTER
E-scribe request for Dicyclomine 20 MG. Please review and e-scribe if applicable. Next Visit Date:  5/2/2019    Health Maintenance   Topic Date Due    Pneumococcal 0-64 years Vaccine (1 of 1 - PPSV23) 12/26/1967    Hepatitis B Vaccine (1 of 3 - Risk 3-dose series) 12/26/1980    Shingles Vaccine (1 of 2) 12/26/2011    Colon cancer screen colonoscopy  12/26/2011    Diabetic microalbuminuria test  10/05/2018    Diabetic retinal exam  01/29/2019    Diabetic foot exam  04/18/2019    A1C test (Diabetic or Prediabetic)  06/21/2019    Breast cancer screen  06/27/2019    Flu vaccine (Season Ended) 09/01/2019    Creatinine monitoring  11/20/2019    Lipid screen  03/21/2020    Potassium monitoring  03/21/2020    Cervical cancer screen  01/10/2023    DTaP/Tdap/Td vaccine (2 - Td) 09/06/2026    Hepatitis C screen  Completed    HIV screen  Completed             (applicable per patient's age: Cancer Screenings, Depression Screening, Fall Risk Screening, Immunizations)    Hemoglobin A1C (%)   Date Value   03/21/2019 14.0   08/28/2018 14   04/05/2018 12.1     Microalb/Crt.  Ratio (mcg/mg creat)   Date Value   10/05/2017 36 (H)     LDL Cholesterol (mg/dL)   Date Value   03/21/2019 17     AST (U/L)   Date Value   07/16/2018 17     ALT (U/L)   Date Value   07/16/2018 18     BUN (mg/dL)   Date Value   11/20/2018 8      (goal A1C is < 7)   (goal LDL is <100) need 30-50% reduction from baseline     BP Readings from Last 3 Encounters:   04/18/19 119/60   03/21/19 125/79   11/20/18 125/89    (goal /80)      All Future Testing planned in [facility]:  Lab Frequency Next Occurrence   XR FOOT RIGHT (2 VIEWS) Once 05/03/2019   Lipid, Fasting Once 05/01/2019   TSH With Reflex Ft4 Once 05/01/2019   Microalbumin, Ur Once 05/18/2019       Next Visit Date:  Future Appointments   Date Time Provider Genna Tran   5/16/2019  9:30 AM Maximus Watson MD [address] [place], [place] [address]   5/23/2019  1:00 PM Nicole Kraft, DO [address] [place] [address] [facility]            Patient Active Problem List:     Diverticulitis of large intestine with abscess without bleeding     Leukocytosis     Morbid obesity with BMI of 40.0-44.9, adult ([address]     Wound, open, abdominal wall, anterior     Incisional hernia, without obstruction or gangrene     Pulmonary embolism ([facility])     Essential hypertension     Chronic back pain     Narcotic abuse ([address]     Generalized abdominal pain     SBO (small bowel obstruction) ([address]     Sigmoid stricture ([address]     Uncontrolled type 2 diabetes mellitus with hyperglycemia, with long-term current use of insulin ([facility])     Hyperglycemia     MIKY (obstructive sleep apnea)     Atelectasis     Hypocalcemia     Vitamin D deficiency     Acute cystitis with hematuria     Hypokalemia     Colon obstruction ([address]

## 2019-05-02 NOTE — TELEPHONE ENCOUNTER
Patient Active Problem List:     Diverticulitis of large intestine with abscess without bleeding     Leukocytosis     Morbid obesity with BMI of 40.0-44.9, adult ([address]     Wound, open, abdominal wall, anterior     Incisional hernia, without obstruction or gangrene     Pulmonary embolism ([facility])     Essential hypertension     Chronic back pain     Narcotic abuse ([address]     Generalized abdominal pain     SBO (small bowel obstruction) ([address]     Sigmoid stricture ([address]     Uncontrolled type 2 diabetes mellitus with hyperglycemia, with long-term current use of insulin ([facility])     Hyperglycemia     MIKY (obstructive sleep apnea)     Atelectasis     Hypocalcemia     Vitamin D deficiency     Acute cystitis with hematuria     Hypokalemia     Colon obstruction ([address]

## 2019-05-02 NOTE — TELEPHONE ENCOUNTER
E-scribe request for Zolpidem 5 MG & Clotrimazole 1 % cream . Please review and e-scribe if applicable. Next Visit Date:  5/2/2019    Health Maintenance   Topic Date Due    Pneumococcal 0-64 years Vaccine (1 of 1 - PPSV23) 12/26/1967    Hepatitis B Vaccine (1 of 3 - Risk 3-dose series) 12/26/1980    Shingles Vaccine (1 of 2) 12/26/2011    Colon cancer screen colonoscopy  12/26/2011    Diabetic microalbuminuria test  10/05/2018    Diabetic retinal exam  01/29/2019    Diabetic foot exam  04/18/2019    A1C test (Diabetic or Prediabetic)  06/21/2019    Breast cancer screen  06/27/2019    Flu vaccine (Season Ended) 09/01/2019    Creatinine monitoring  11/20/2019    Lipid screen  03/21/2020    Potassium monitoring  03/21/2020    Cervical cancer screen  01/10/2023    DTaP/Tdap/Td vaccine (2 - Td) 09/06/2026    Hepatitis C screen  Completed    HIV screen  Completed             (applicable per patient's age: Cancer Screenings, Depression Screening, Fall Risk Screening, Immunizations)    Hemoglobin A1C (%)   Date Value   03/21/2019 14.0   08/28/2018 14   04/05/2018 12.1     Microalb/Crt.  Ratio (mcg/mg creat)   Date Value   10/05/2017 36 (H)     LDL Cholesterol (mg/dL)   Date Value   03/21/2019 17     AST (U/L)   Date Value   07/16/2018 17     ALT (U/L)   Date Value   07/16/2018 18     BUN (mg/dL)   Date Value   11/20/2018 8      (goal A1C is < 7)   (goal LDL is <100) need 30-50% reduction from baseline     BP Readings from Last 3 Encounters:   04/18/19 119/60   03/21/19 125/79   11/20/18 125/89    (goal /80)      All Future Testing planned in [facility]:  Lab Frequency Next Occurrence   XR FOOT RIGHT (2 VIEWS) Once 05/03/2019   Lipid, Fasting Once 05/01/2019   TSH With Reflex Ft4 Once 05/01/2019   Microalbumin, Ur Once 05/18/2019       Next Visit Date:  Future Appointments   Date Time Provider Genna Tran   5/16/2019  9:30 AM Elan Hebert MD [facility]   5/23/2019  1:00 PM Sukhdev Francois

## 2019-05-16 ENCOUNTER — CARE COORDINATION (OUTPATIENT)
Dept: CARE COORDINATION | Age: 59
End: 2019-05-16

## 2019-05-20 NOTE — CARE COORDINATION
Ambulatory Care Coordination Note  5/20/2019  CM Risk Score: 9  Pan Mortality Risk Score:      ACC: Nae Beck RN    Summary Note: [facility] Asked her how the Starting Fresh classes are going. She stated that she hadn't started them. When asked why, she said \"I had some things that came up\". Asked about her sugars. She said they've been ok, around 190-210. Asked if she wanted to do the next Starting Fresh session and she stated yes. CC stated that she would find out when it starts. She stated \"you can call me back later. I was sleeping\". Will call back tomorrow with dates for next Starting Fresh class. Care Coordination Interventions    Program Enrollment:  Complex Care  Referral from Primary Care Provider:  Yes  Suggested Interventions and Community Resources  [facility]  Not Started  Diabetes Education:  Declined  Registered Dietician:  Declined  Smoking Cessation:  Not Started  Other Therapy Services:  Completed (Comment: Starting [employer])  Zone Management Tools:  Completed         Goals Addressed     None          Prior to Admission medications    Medication Sig Start Date End Date Taking?  Authorizing Provider   zolpidem (AMBIEN) 5 MG tablet TAKE ONE TABLET BY MOUTH AT BEDTIME AS NEEDED FOR SLEEP 5/2/19 5/22/19  Jessa Carreon MD   CLOTRIMAZOLE ANTI-FUNGAL 1 % cream APPLY TO AFFECTED AREA TWICE DAILY 5/2/19   Rebeca Marcos MD   dicyclomine (BENTYL) 20 MG tablet TAKE ONE TABLET BY MOUTH THREE TIMES DAILY AS NEEDED FOR ABDOMINAL PAIN 5/2/19   Rebeca Marcos MD   TRUEPLUS LANCETS 33G MISC USE AS DIRECTED THREE TIMES DAILY 5/2/19   Jessa Carreon MD   triamcinolone (KENALOG) 0.025 % cream APPLY TO AFFECTED AREA TWICE DAILY 4/18/19   Lulú Rod MD   diphenhydrAMINE (BENADRYL) 12.5 MG chewable tablet Take 1 tablet by mouth 4 times daily as needed for Allergies 4/18/19   Lulú Rod MD   ipratropium-albuterol (DUONEB) 0.5-2.5 (3) MG/3ML SOLN nebulizer solution Inhale 3 mLs into the lungs 4 times daily as needed for Shortness of Breath 3/21/19   Keyshawn Das MD   insulin lispro (HUMALOG KWIKPEN) 100 UNIT/ML pen Take 3 times ,<200 - 0 units, 201-250 - 4 units, 251-300 - 6 units, 301-350 - 8 units, 351-400 - 10 units, >400 - 12 units. 3/21/19   Aristides Barcenas MD   insulin glargine (BASAGLAR KWIKPEN) 100 UNIT/ML injection pen Inject 70 Units into the skin nightly 3/21/19   Aristides Barcenas MD   Liraglutide (VICTOZA) 18 MG/3ML SOPN SC injection Inject 0.6 mg subcutaneously once daily for 7 days then increase it to 1.2 mg daily.  3/21/19   Keyshawn Das MD   ULTICARE ALCOHOL SWABS 70 % PADS USE AS DIRECTED 3/8/19   Luiz Blandon MD   carvedilol (COREG) 3.125 MG tablet TAKE ONE TABLET BY MOUTH TWICE DAILY 3/8/19   Phan Marcos MD   nicotine (NICODERM CQ) 21 MG/24HR PLACE 1 PATCH ONTO THE SKIN EVERY 24 HOURS 3/8/19 3/7/20  Luiz Blandon MD   atorvastatin (LIPITOR) 80 MG tablet TAKE ONE TABLET BY MOUTH DAILY 3/8/19   Phan Marcos MD   EASY TOUCH PEN NEEDLES 32G X 6 MM MISC USE AS DIRECTED 3/8/19   Luiz Blandon MD   famotidine (PEPCID) 20 MG tablet TAKE ONE TABLET BY MOUTH TWICE DAILY 2/8/19   Phan Marcos MD   ASPIRIN LOW DOSE 81 MG EC tablet TAKE ONE TABLET BY MOUTH DAILY 1/8/19   Luiz Blandon MD   Incontinence Supplies MISC Use as needed for incontinence 12/27/18   Luiz Blandon MD   ibuprofen (IBU) 400 MG tablet Take 1 tablet by mouth every 6 hours as needed for Pain 11/20/18   Levy Rice MD   Petrolatum-Lanolin-Zinc Oxide 57-16-10 % OINT Apply 2 Act topically 2 times daily 8/28/18   Sylvester Rodriguez MD   acetaminophen (TYLENOL) 325 MG tablet Take 1 tablet by mouth every 6 hours as needed for Pain 8/25/18   Elan Gutierrez MD   TRUE METRIX BLOOD GLUCOSE TEST strip TEST BLOOD SUGAR DAILY AS DIRECTED 5/1/18   Little Schneider MD   metFORMIN (GLUCOPHAGE) 1000 MG tablet TAKE ONE

## 2019-05-29 ENCOUNTER — HOSPITAL ENCOUNTER (EMERGENCY)
Age: 59
Discharge: HOME | End: 2019-05-29
Attending: EMERGENCY MEDICINE
Payer: COMMERCIAL

## 2019-05-29 VITALS
HEART RATE: 95 BPM | RESPIRATION RATE: 16 BRPM | DIASTOLIC BLOOD PRESSURE: 89 MMHG | BODY MASS INDEX: 48.71 KG/M2 | OXYGEN SATURATION: 97 % | TEMPERATURE: 98.7 F | WEIGHT: 275 LBS | SYSTOLIC BLOOD PRESSURE: 144 MMHG

## 2019-05-29 DIAGNOSIS — L02.91 ABSCESS: Primary | ICD-10-CM

## 2019-05-29 DIAGNOSIS — K61.0 PERIANAL ABSCESS: ICD-10-CM

## 2019-05-29 PROCEDURE — 6370000000 HC RX 637 (ALT 250 FOR IP): Performed by: EMERGENCY MEDICINE

## 2019-05-29 PROCEDURE — 10060 I&D ABSCESS SIMPLE/SINGLE: CPT

## 2019-05-29 PROCEDURE — 99282 EMERGENCY DEPT VISIT SF MDM: CPT

## 2019-05-29 RX ORDER — DOXYCYCLINE 100 MG/1
100 TABLET ORAL 2 TIMES DAILY
Qty: 20 TABLET | Refills: 0 | Status: SHIPPED | OUTPATIENT
Start: 2019-05-29 | End: 2019-06-08

## 2019-05-29 RX ORDER — DOXYCYCLINE HYCLATE 100 MG
100 TABLET ORAL ONCE
Status: COMPLETED | OUTPATIENT
Start: 2019-05-29 | End: 2019-05-29

## 2019-05-29 RX ORDER — ACETAMINOPHEN 325 MG/1
325 TABLET ORAL EVERY 6 HOURS PRN
Qty: 120 TABLET | Refills: 0 | Status: SHIPPED | OUTPATIENT
Start: 2019-05-29 | End: 2019-10-24 | Stop reason: SDUPTHER

## 2019-05-29 RX ORDER — OXYCODONE HYDROCHLORIDE AND ACETAMINOPHEN 5; 325 MG/1; MG/1
1 TABLET ORAL ONCE
Status: COMPLETED | OUTPATIENT
Start: 2019-05-29 | End: 2019-05-29

## 2019-05-29 RX ORDER — OXYCODONE HYDROCHLORIDE AND ACETAMINOPHEN 5; 325 MG/1; MG/1
1-2 TABLET ORAL EVERY 6 HOURS PRN
Qty: 5 TABLET | Refills: 0 | Status: SHIPPED | OUTPATIENT
Start: 2019-05-29 | End: 2019-06-01

## 2019-05-29 RX ORDER — NYSTATIN 100000 U/G
CREAM TOPICAL
Qty: 1 TUBE | Refills: 0 | Status: SHIPPED | OUTPATIENT
Start: 2019-05-29 | End: 2019-10-24 | Stop reason: SDUPTHER

## 2019-05-29 RX ADMIN — DOXYCYCLINE HYCLATE 100 MG: 100 TABLET, COATED ORAL at 15:17

## 2019-05-29 RX ADMIN — OXYCODONE HYDROCHLORIDE AND ACETAMINOPHEN 1 TABLET: 5; 325 TABLET ORAL at 15:17

## 2019-05-29 ASSESSMENT — ENCOUNTER SYMPTOMS
EYES NEGATIVE: 1
GASTROINTESTINAL NEGATIVE: 1
ALLERGIC/IMMUNOLOGIC NEGATIVE: 1
RESPIRATORY NEGATIVE: 1

## 2019-05-29 ASSESSMENT — PAIN DESCRIPTION - LOCATION: LOCATION: BUTTOCKS

## 2019-05-29 ASSESSMENT — PAIN DESCRIPTION - PROGRESSION: CLINICAL_PROGRESSION: GRADUALLY WORSENING

## 2019-05-29 ASSESSMENT — PAIN SCALES - GENERAL
PAINLEVEL_OUTOF10: 10
PAINLEVEL_OUTOF10: 10

## 2019-05-29 ASSESSMENT — PAIN DESCRIPTION - ONSET: ONSET: SUDDEN

## 2019-05-29 ASSESSMENT — PAIN DESCRIPTION - DESCRIPTORS: DESCRIPTORS: ACHING

## 2019-05-29 ASSESSMENT — PAIN DESCRIPTION - PAIN TYPE: TYPE: ACUTE PAIN

## 2019-05-29 NOTE — ED PROVIDER NOTES
[facility]  ED  Emergency Department Encounter  EmergencyMedicine Resident     Pt Nay Barcenas  MRN: [id number]  Corinegfrafi 1960  Date of evaluation: 5/29/19  PCP:  MD Jimmy Fischer       Chief Complaint   Patient presents with    Abscess     pt with c/o rectal abscess. began friday, began getting better but then two days ago suddenly got worse and became painful. pt with difficulty sitting due to pain. denies hx of abscesses. HISTORY OF PRESENT ILLNESS  (Location/Symptom, Timing/Onset, Context/Setting, Quality, Duration, Modifying Factors, Severity.)      David Magaña is a 63 y.o. female who presents with an abscess the left gluteal region. Patient position of his abscess on Friday, states it has been getting worse since then. Patient reports exquisite pain. She denies any fevers or chills, denies any previous issues of abscesses. PAST MEDICAL / SURGICAL / SOCIAL / FAMILY HISTORY      has a past medical history of Allergic rhinitis, Arthritis, Chronic back pain, Diverticulitis, GERD (gastroesophageal reflux disease), Headache(784.0), Hyperlipidemia, Hypertension, Knee pain, MDRO (multiple drug resistant organisms) resistance, Narcotic abuse ([facility] [address] Obesity, Osteoarthritis, Rectovaginal fistula, Type II or unspecified type diabetes mellitus without mention of complication, not stated as uncontrolled, and Urinary incontinence. has a past surgical history that includes Knee Arthroplasty; Abdomen surgery (5/1/16); ventral hernia repair (061570); Abdomen surgery (01/21/2014); and Abdominal exploration surgery (01/02/2017).     Social History     Socioeconomic History    Marital status: Single     Spouse name: Not on file    Number of children: Not on file    Years of education: Not on file    Highest education level: Not on file   Occupational History    Not on file   Social Needs    Financial resource strain: Not on file   Felix-Perri acetaminophen (TYLENOL) 325 MG tablet Take 1 tablet by mouth every 6 hours as needed for Pain 5/29/19  Yes Parker Logan MD   CLOTRIMAZOLE ANTI-FUNGAL 1 % cream APPLY TO AFFECTED AREA TWICE DAILY 5/2/19   Bogdan Marcos MD   dicyclomine (BENTYL) 20 MG tablet TAKE ONE TABLET BY MOUTH THREE TIMES DAILY AS NEEDED FOR ABDOMINAL PAIN 5/2/19   Bogdan Marcos MD   TRUEPLUS LANCETS 33G MISC USE AS DIRECTED THREE TIMES DAILY 5/2/19   Daisy Adams MD   triamcinolone (KENALOG) 0.025 % cream APPLY TO AFFECTED AREA TWICE DAILY 4/18/19   Marjorie Johnson MD   diphenhydrAMINE (BENADRYL) 12.5 MG chewable tablet Take 1 tablet by mouth 4 times daily as needed for Allergies 4/18/19   Marjorie Johnson MD   ipratropium-albuterol (DUONEB) 0.5-2.5 (3) MG/3ML SOLN nebulizer solution Inhale 3 mLs into the lungs 4 times daily as needed for Shortness of Breath 3/21/19   Yael Paul MD   insulin lispro (HUMALOG KWIKPEN) 100 UNIT/ML pen Take 3 times ,<200 - 0 units, 201-250 - 4 units, 251-300 - 6 units, 301-350 - 8 units, 351-400 - 10 units, >400 - 12 units. 3/21/19   Aristides Morales MD   insulin glargine (BASAGLAR KWIKPEN) 100 UNIT/ML injection pen Inject 70 Units into the skin nightly 3/21/19   Aristides Morales MD   Liraglutide (VICTOZA) 18 MG/3ML SOPN SC injection Inject 0.6 mg subcutaneously once daily for 7 days then increase it to 1.2 mg daily.  3/21/19   Yael Paul MD   ULTICARE ALCOHOL SWABS 70 % PADS USE AS DIRECTED 3/8/19   Daisy Adams MD   carvedilol (COREG) 3.125 MG tablet TAKE ONE TABLET BY MOUTH TWICE DAILY 3/8/19   Daisy Adams MD   nicotine (NICODERM CQ) 21 MG/24HR PLACE 1 PATCH ONTO THE SKIN EVERY 24 HOURS 3/8/19 3/7/20  Daisy Adams MD   atorvastatin (LIPITOR) 80 MG tablet TAKE ONE TABLET BY MOUTH DAILY 3/8/19   Daisy Adams MD   EASY TOUCH PEN NEEDLES 32G X 6 MM MISC USE AS DIRECTED 3/8/19   Daisy Adams MD   famotidine (PEPCID) 20 MG tablet TAKE ONE TABLET BY MOUTH TWICE DAILY 2/8/19   Scott Imam Priti MD   ASPIRIN LOW DOSE 81 MG EC tablet TAKE ONE TABLET BY MOUTH DAILY 1/8/19   Marisa Olivera MD   Incontinence Supplies MISC Use as needed for incontinence 12/27/18   Marisa Olivera MD   ibuprofen (IBU) 400 MG tablet Take 1 tablet by mouth every 6 hours as needed for Pain 11/20/18   Lillette Jeans, MD   Petrolatum-Lanolin-Zinc Oxide 57-16-10 % OINT Apply 2 Act topically 2 times daily 8/28/18   Paradise Griffin MD   acetaminophen (TYLENOL) 325 MG tablet Take 1 tablet by mouth every 6 hours as needed for Pain 8/25/18   Debbie Greer MD   TRUE METRIX BLOOD GLUCOSE TEST strip TEST BLOOD SUGAR DAILY AS DIRECTED 5/1/18   Dilip Asif MD   metFORMIN (GLUCOPHAGE) 1000 MG tablet TAKE ONE TABLET BY MOUTH TWICE DAILY WITH MEALS 5/1/18   Dada Dewitt MD   DOCQLACE 100 MG capsule  9/12/17   Historical Provider, MD   Alcohol Swabs (CURITY ALCOHOL PREPS) 70 % PADS USE AS DIRECTED (THREE TIMES DAILY) 10/5/17   Ariadne Guevara MD       REVIEW OF SYSTEMS    (2-9 systems for level 4, 10 or more for level 5)      Review of Systems   Constitutional: Negative. HENT: Negative. Eyes: Negative. Respiratory: Negative. Cardiovascular: Negative. Gastrointestinal: Negative. Endocrine: Negative. Genitourinary: Negative. Musculoskeletal: Negative. Skin: Positive for wound. Allergic/Immunologic: Negative. Neurological: Negative. Hematological: Negative. Psychiatric/Behavioral: Negative. PHYSICAL EXAM   (up to 7 for level 4, 8 or more for level 5)      INITIAL VITALS:   BP (!) 144/89   Pulse 95   Temp 98.7 °F (37.1 °C) (Oral)   Resp 16   Wt 275 lb (124.7 kg)   SpO2 97%   BMI 48.71 kg/m²     Physical Exam   Constitutional: She is oriented to person, place, and time. HENT:   Head: Normocephalic and atraumatic.    Right Ear: External ear normal.   Left Ear: External ear normal.   Eyes: Pupils are equal, round, and reactive to light. EOM are normal. Right eye exhibits no discharge. Left eye exhibits no discharge. Neck: Neck supple. No JVD present. No tracheal deviation present. Cardiovascular: Normal rate, regular rhythm and normal heart sounds. Exam reveals no gallop and no friction rub. No murmur heard. Pulmonary/Chest: No respiratory distress. She has no wheezes. She exhibits no tenderness. Abdominal: Soft. Bowel sounds are normal. She exhibits no distension. There is no tenderness. There is no guarding. Musculoskeletal: Normal range of motion. She exhibits no edema or deformity. Lymphadenopathy:     She has no cervical adenopathy. Neurological: She is alert and oriented to person, place, and time. No cranial nerve deficit. She exhibits normal muscle tone. Skin: Skin is warm and dry. No rash noted. She is not diaphoretic. Nursing note and vitals reviewed. DIFFERENTIAL  DIAGNOSIS     PLAN (LABS / IMAGING / EKG):  Orders Placed This Encounter   Procedures    INCISION AND DRAINAGE       MEDICATIONS ORDERED:  Orders Placed This Encounter   Medications    doxycycline hyclate (VIBRA-TABS) tablet 100 mg    oxyCODONE-acetaminophen (PERCOCET) 5-325 MG per tablet 1 tablet    doxycycline monohydrate (ADOXA) 100 MG tablet     Sig: Take 1 tablet by mouth 2 times daily for 10 days     Dispense:  20 tablet     Refill:  0    oxyCODONE-acetaminophen (PERCOCET) 5-325 MG per tablet     Sig: Take 1-2 tablets by mouth every 6 hours as needed for Pain for up to 3 days. WARNING:  May cause drowsiness. May impair ability to operate vehicles or machinery. Do not use in combination with alcohol.      Dispense:  5 tablet     Refill:  0    acetaminophen (TYLENOL) 325 MG tablet     Sig: Take 1 tablet by mouth every 6 hours as needed for Pain     Dispense:  120 tablet     Refill:  0       Perianal abscess, perirectal abscess, cellulitis, Rina's gangrene    DIAGNOSTIC RESULTS / EMERGENCY DEPARTMENT COURSE / MDM ACETAMINOPHEN (TYLENOL) 325 MG TABLET    Take 1 tablet by mouth every 6 hours as needed for Pain    DOXYCYCLINE MONOHYDRATE (ADOXA) 100 MG TABLET    Take 1 tablet by mouth 2 times daily for 10 days    OXYCODONE-ACETAMINOPHEN (PERCOCET) 5-325 MG PER TABLET    Take 1-2 tablets by mouth every 6 hours as needed for Pain for up to 3 days. WARNING:  May cause drowsiness. May impair ability to operate vehicles or machinery. Do not use in combination with alcohol.        Calixto Alvarenga MD  Emergency Medicine Resident    (Please note that portions of thisnote were completed with a voice recognition program.  Efforts were made to edit the dictations but occasionally words are mis-transcribed.)       Calixto Alvarenga MD  Resident  05/29/19 [id number]

## 2019-05-29 NOTE — ED PROVIDER NOTES
[facility]     Emergency Department     Faculty Attestation    I performed a history and physical examination of the patient and discussed management with the resident. I reviewed the residents note and agree with the documented findings and plan of care. Any areas of disagreement are noted on the chart. I was personally present for the key portions of any procedures. I have documented in the chart those procedures where I was not present during the key portions. I have reviewed the emergency nurses triage note. I agree with the chief complaint, past medical history, past surgical history, allergies, medications, social and family history as documented unless otherwise noted below. Documentation of the HPI, Physical Exam and Medical Decision Making performed by medical students or scribes is based on my personal performance of the HPI, PE and MDM. For Physician Assistant/ Nurse Practitioner cases/documentation I have personally evaluated this patient and have completed at least one if not all key elements of the E/M (history, physical exam, and MDM). Additional findings are as noted. Vital signs:   Vitals:    05/29/19 1419   BP: (!) 144/89   Pulse: 95   Resp: 16   Temp: 98.7 °F (37.1 °C)   SpO2: 97%      Small abscess in the inferior aspect of the gluteal cleft. We will I&D. Also has candida in that area. Topical nystatin.             Sadiq Duarte M.D,  Attending Emergency  Physician           Laverne Meadows MD  05/29/19 [id number]

## 2019-05-30 ENCOUNTER — CARE COORDINATION (OUTPATIENT)
Dept: CARE COORDINATION | Age: 59
End: 2019-05-30

## 2019-05-31 NOTE — CARE COORDINATION
schedule routine eye examinations with an eye specialist as directed by my provider. I will consider obtaining vaccines recommended by my provider. I will take my medications as prescribed by my providers. I will schedule regular follow ups with my providers and keep my appointments. Barriers: lack of support, overwhelmed by complexity of regimen and lack of education  Plan for overcoming my barriers: Work with Care Coordinator on managing diabetes; Consider dietician referral  Confidence: 7/10  Anticipated Goal Completion Date: 4/30/2019            Prior to Admission medications    Medication Sig Start Date End Date Taking? Authorizing Provider   doxycycline monohydrate (ADOXA) 100 MG tablet Take 1 tablet by mouth 2 times daily for 10 days 5/29/19 6/8/19  Ivette Fernández MD   oxyCODONE-acetaminophen (PERCOCET) 5-325 MG per tablet Take 1-2 tablets by mouth every 6 hours as needed for Pain for up to 3 days. WARNING:  May cause drowsiness. May impair ability to operate vehicles or machinery. Do not use in combination with alcohol. 5/29/19 6/1/19  Ivette Fernández MD   acetaminophen (TYLENOL) 325 MG tablet Take 1 tablet by mouth every 6 hours as needed for Pain 5/29/19   Ivette Fernández MD   nystatin (MYCOSTATIN) 111094 UNIT/GM cream Apply topically 2 times daily.  5/29/19   Ivette Fernández MD   CLOTRIMAZOLE ANTI-FUNGAL 1 % cream APPLY TO AFFECTED AREA TWICE DAILY 5/2/19   Madelyn Marcos MD   dicyclomine (BENTYL) 20 MG tablet TAKE ONE TABLET BY MOUTH THREE TIMES DAILY AS NEEDED FOR ABDOMINAL PAIN 5/2/19   Madelyn Marcos MD   TRUEPLUS LANCETS 33G MISC USE AS DIRECTED THREE TIMES DAILY 5/2/19   Brandt Rios MD   triamcinolone (KENALOG) 0.025 % cream APPLY TO AFFECTED AREA TWICE DAILY 4/18/19   Edwige Lovelace MD   diphenhydrAMINE (BENADRYL) 12.5 MG chewable tablet Take 1 tablet by mouth 4 times daily as needed for Allergies 4/18/19   Edwige Lovelace MD ipratropium-albuterol (DUONEB) 0.5-2.5 (3) MG/3ML SOLN nebulizer solution Inhale 3 mLs into the lungs 4 times daily as needed for Shortness of Breath 3/21/19   Keyshawn Das MD   insulin lispro (HUMALOG KWIKPEN) 100 UNIT/ML pen Take 3 times ,<200 - 0 units, 201-250 - 4 units, 251-300 - 6 units, 301-350 - 8 units, 351-400 - 10 units, >400 - 12 units. 3/21/19   Aristides Barcenas MD   insulin glargine (BASAGLAR KWIKPEN) 100 UNIT/ML injection pen Inject 70 Units into the skin nightly 3/21/19   Aristides Barcenas MD   Liraglutide (VICTOZA) 18 MG/3ML SOPN SC injection Inject 0.6 mg subcutaneously once daily for 7 days then increase it to 1.2 mg daily.  3/21/19   Keyshawn Das MD   ULTICARE ALCOHOL SWABS 70 % PADS USE AS DIRECTED 3/8/19   Phan Marcos MD   carvedilol (COREG) 3.125 MG tablet TAKE ONE TABLET BY MOUTH TWICE DAILY 3/8/19   Luiz Blandon MD   nicotine (NICODERM CQ) 21 MG/24HR PLACE 1 PATCH ONTO THE SKIN EVERY 24 HOURS 3/8/19 3/7/20  Luiz Blandon MD   atorvastatin (LIPITOR) 80 MG tablet TAKE ONE TABLET BY MOUTH DAILY 3/8/19   Luiz Blandon MD   EASY TOUCH PEN NEEDLES 32G X 6 MM MISC USE AS DIRECTED 3/8/19   Luiz Blandon MD   famotidine (PEPCID) 20 MG tablet TAKE ONE TABLET BY MOUTH TWICE DAILY 2/8/19   Phan Marcos MD   ASPIRIN LOW DOSE 81 MG EC tablet TAKE ONE TABLET BY MOUTH DAILY 1/8/19   Luiz Blandon MD   Incontinence Supplies MISC Use as needed for incontinence 12/27/18   Luiz Blandon MD   ibuprofen (IBU) 400 MG tablet Take 1 tablet by mouth every 6 hours as needed for Pain 11/20/18   Levy Pipes, MD   Petrolatum-Lanolin-Zinc Oxide 57-16-10 % OINT Apply 2 Act topically 2 times daily 8/28/18   Sylvester Rodriguez MD   TRUE METRIX BLOOD GLUCOSE TEST strip TEST BLOOD SUGAR DAILY AS DIRECTED 5/1/18   Dilip Asif MD   metFORMIN (GLUCOPHAGE) 1000 MG tablet TAKE ONE TABLET BY MOUTH TWICE DAILY WITH MEALS 5/1/18   Little Schneider MD   [facility] 100 MG capsule  9/12/17   Historical Provider, MD       Future Appointments   Date Time Provider Genna Tran   6/6/2019  9:10 AM Kamilah Madrid MD [id number]      and   Diabetes Assessment    Medic Alert ID:  No  Meal Planning:  Avoidance of concentrated sweets   How often do you test your blood sugar?:  Daily, Meals, Bedtime   Do you have barriers with adherence to non-pharmacologic self-management interventions?  (Nutrition/Exercise/Self-Monitoring):  No   Have you ever had to go to the ED for symptoms of low blood sugar?:  No       No patient-reported symptoms   Do you have hyperglycemia symptoms?:  No   Do you have hypoglycemia symptoms?:  No   Blood Sugar Monitoring Regimen:  Morning Fasting   Blood Sugar Trends:  Fluctuating

## 2019-07-08 DIAGNOSIS — Z79.4 UNCONTROLLED TYPE 2 DIABETES MELLITUS WITH HYPERGLYCEMIA, WITH LONG-TERM CURRENT USE OF INSULIN (HCC): ICD-10-CM

## 2019-07-08 DIAGNOSIS — I10 ESSENTIAL HYPERTENSION: ICD-10-CM

## 2019-07-08 DIAGNOSIS — E11.65 UNCONTROLLED TYPE 2 DIABETES MELLITUS WITH HYPERGLYCEMIA, WITH LONG-TERM CURRENT USE OF INSULIN (HCC): ICD-10-CM

## 2019-07-08 DIAGNOSIS — E78.2 MIXED HYPERLIPIDEMIA: ICD-10-CM

## 2019-07-08 DIAGNOSIS — E11.21 CONTROLLED TYPE 2 DIABETES MELLITUS WITH DIABETIC NEPHROPATHY, WITHOUT LONG-TERM CURRENT USE OF INSULIN (HCC): ICD-10-CM

## 2019-07-11 RX ORDER — ALCOHOL ANTISEPTIC PADS
PADS, MEDICATED (EA) TOPICAL
Qty: 1 EACH | Refills: 1 | Status: SHIPPED | OUTPATIENT
Start: 2019-07-11 | End: 2019-09-06 | Stop reason: SDUPTHER

## 2019-07-11 RX ORDER — DICYCLOMINE HCL 20 MG
TABLET ORAL
Qty: 120 TABLET | Refills: 0 | Status: SHIPPED | OUTPATIENT
Start: 2019-07-11 | End: 2019-08-13 | Stop reason: SDUPTHER

## 2019-07-11 RX ORDER — GLIPIZIDE 5 MG/1
TABLET ORAL
Qty: 60 TABLET | Refills: 3 | Status: SHIPPED | OUTPATIENT
Start: 2019-07-11 | End: 2019-10-24 | Stop reason: SDUPTHER

## 2019-07-11 RX ORDER — ASPIRIN 81 MG
TABLET, DELAYED RELEASE (ENTERIC COATED) ORAL
Qty: 30 TABLET | Refills: 5 | Status: SHIPPED | OUTPATIENT
Start: 2019-07-11 | End: 2019-10-24 | Stop reason: SDUPTHER

## 2019-07-11 RX ORDER — PEN NEEDLE, DIABETIC 32GX 5/32"
NEEDLE, DISPOSABLE MISCELLANEOUS
Qty: 3 PACKAGE | Refills: 1 | Status: SHIPPED | OUTPATIENT
Start: 2019-07-11

## 2019-07-11 RX ORDER — ATORVASTATIN CALCIUM 80 MG/1
TABLET, FILM COATED ORAL
Qty: 30 TABLET | Refills: 3 | Status: SHIPPED | OUTPATIENT
Start: 2019-07-11 | End: 2019-10-24 | Stop reason: SDUPTHER

## 2019-07-11 RX ORDER — CARVEDILOL 3.12 MG/1
TABLET ORAL
Qty: 60 TABLET | Refills: 3 | Status: SHIPPED | OUTPATIENT
Start: 2019-07-11 | End: 2019-10-24 | Stop reason: SDUPTHER

## 2019-07-30 ENCOUNTER — CARE COORDINATION (OUTPATIENT)
Dept: CARE COORDINATION | Age: 59
End: 2019-07-30

## 2019-07-30 NOTE — CARE COORDINATION
Ambulatory Care Coordination Note  7/30/2019  CM Risk Score: 9  Charlson 10 Year Mortality Risk Score: 10%     ACC: Joel Aguilera RN    Summary Note: Attempted to reach patient for CC follow up. Message left on VM to call [phone]. Care Coordination Interventions    Program Enrollment:  Complex Care  Referral from Primary Care Provider:  Yes  Suggested Interventions and Community Resources  [facility]  Not [facility]  Diabetes Education:  Declined  Registered Dietician:  Declined  Smoking Cessation:  Not Started  Other Therapy Services: In Process (Comment: Starting [employer])  Transportation Support:  Completed  Zone Management Tools:  Completed         Goals Addressed    None         Prior to Admission medications    Medication Sig Start Date End Date Taking? Authorizing Provider   SURE COMFORT PEN NEEDLES 32G X 4 MM MISC USE AS DIRECTED 7/11/19   Danni Peterson MD   glipiZIDE (GLUCOTROL) 5 MG tablet TAKE ONE TABLET BY MOUTH TWICE DAILY 7/11/19   Danni Peterson MD   dicyclomine (BENTYL) 20 MG tablet TAKE ONE TABLET BY MOUTH THREE TIMES DAILY AS NEEDED FOR ABDOMINAL PAIN 7/11/19   Danni Peterson MD   carvedilol (COREG) 3.125 MG tablet TAKE ONE TABLET BY MOUTH TWICE DAILY 7/11/19   Alyssa Wilkins MD   ASPIRIN LOW DOSE 81 MG EC tablet TAKE ONE TABLET BY MOUTH DAILY 7/11/19   Danni Peterson MD   atorvastatin (LIPITOR) 80 MG tablet TAKE ONE TABLET BY MOUTH DAILY 7/11/19   Danni Peterson MD   ULTICARE ALCOHOL SWABS 70 % PADS USE AS DIRECTED 7/11/19   Danni Peterson MD   acetaminophen (TYLENOL) 325 MG tablet Take 1 tablet by mouth every 6 hours as needed for Pain 5/29/19   Nay Cuenca MD   nystatin (MYCOSTATIN) 105907 UNIT/GM cream Apply topically 2 times daily.  5/29/19   Nay Cuenca MD   CLOTRIMAZOLE ANTI-FUNGAL 1 % cream APPLY TO AFFECTED AREA TWICE DAILY 5/2/19   Antwan Wan MD   TRUEPLUS LANCETS 33G MISC USE AS DIRECTED THREE TIMES DAILY 5/2/19   Antwan Wan MD Ish Bar MD [facility] IM [facility]

## 2019-07-31 ENCOUNTER — CARE COORDINATION (OUTPATIENT)
Dept: CARE COORDINATION | Age: 59
End: 2019-07-31

## 2019-07-31 NOTE — CARE COORDINATION
Ambulatory Care Coordination Note  7/31/2019  CM Risk Score: 9  Charlson 10 Year Mortality Risk Score: 10%     ACC: Jaclyn Steele, RN    Summary Note: Received a phone call from [facility] returning call to [address] [place] [address]. She said she was doing ok. Her blood sugars are improving. Her last blood sugar was 210. She is currently taking the Basaglar 70 units nightly, the humalog 20 units pre-meal, along with her oral meds. She is not taking the victoza. She said her insurance wouldn't cover it. Asked about the [employer] classes. She said she never went. No one called her about the classes. CC stated that she would talk to Adriel Breaux, CHW about the classes, to get her enrolled. She was agreeable. She has a follow up with new provider on 8/22/19. She said she needed to schedule an appt with her podiatrist and gyn for follow ups. Gave her contact numbers for their offices so she could call and schedule her appts. She is due for her mammogram and eye exams as well. CC stated that either her gyn or new PCP can order the mammogram. She will call [facility] to schedule her eye exam.  PLAN:  Follow up with patient on 8/22 at the office. Follow up on mammogram, eye exam, foot exam      Care Coordination Interventions    Program Enrollment:  Complex Care  Referral from Primary Care Provider:  Yes  Suggested Interventions and Community Resources  [facility]  Not Started  Diabetes Education:  Declined  Registered Dietician:  Declined  Smoking Cessation:  Not Started  Other Therapy Services: In Process (Comment: Starting [employer])  Transportation Support:  Completed  Zone Management Tools:  Completed         Goals Addressed                 This Visit's Progress     Wellness Goal   Improving     Patient Self-Management Goal for Health Maintenance  Goal: I will follow a healthy diet as discussed by my provider. I will schedule screening colonoscopies as directed by my provider.   I will schedule routine PAP smears as directed by

## 2019-08-13 DIAGNOSIS — K21.9 GASTROESOPHAGEAL REFLUX DISEASE WITHOUT ESOPHAGITIS: ICD-10-CM

## 2019-08-14 RX ORDER — DICYCLOMINE HCL 20 MG
TABLET ORAL
Qty: 60 TABLET | Refills: 0 | Status: SHIPPED | OUTPATIENT
Start: 2019-08-14 | End: 2019-10-10 | Stop reason: SDUPTHER

## 2019-08-14 RX ORDER — FAMOTIDINE 20 MG/1
TABLET, FILM COATED ORAL
Qty: 30 TABLET | Refills: 0 | Status: SHIPPED | OUTPATIENT
Start: 2019-08-14 | End: 2019-10-10 | Stop reason: SDUPTHER

## 2019-09-06 DIAGNOSIS — L30.4 INTERTRIGO: ICD-10-CM

## 2019-09-06 RX ORDER — THERMOMETER, ELECTRONIC,ORAL
EACH MISCELLANEOUS
Qty: 14.17 G | Refills: 3 | Status: SHIPPED | OUTPATIENT
Start: 2019-09-06 | End: 2019-10-24 | Stop reason: SDUPTHER

## 2019-09-06 RX ORDER — ALCOHOL ANTISEPTIC PADS
PADS, MEDICATED (EA) TOPICAL
Qty: 30 EACH | Refills: 1 | Status: SHIPPED | OUTPATIENT
Start: 2019-09-06 | End: 2019-10-24 | Stop reason: SDUPTHER

## 2019-09-06 NOTE — TELEPHONE ENCOUNTER
Alcohol swabs pending for refill     Health Maintenance   Topic Date Due    Pneumococcal 0-64 years Vaccine (1 of 1 - PPSV23) 12/26/1967    Hepatitis B Vaccine (1 of 3 - Risk 3-dose series) 12/26/1980    Shingles Vaccine (1 of 2) 12/26/2011    Colon cancer screen colonoscopy  12/26/2011    Diabetic microalbuminuria test  10/05/2018    Diabetic retinal exam  01/29/2019    Diabetic foot exam  04/18/2019    A1C test (Diabetic or Prediabetic)  06/21/2019    Breast cancer screen  06/27/2019    Flu vaccine (1) 09/01/2019    Creatinine monitoring  11/20/2019    Lipid screen  03/21/2020    Potassium monitoring  03/21/2020    Cervical cancer screen  01/10/2023    DTaP/Tdap/Td vaccine (2 - Td) 09/06/2026    Hepatitis C screen  Completed    HIV screen  Completed             (applicable per patient's age: Cancer Screenings, Depression Screening, Fall Risk Screening, Immunizations)    Hemoglobin A1C (%)   Date Value   03/21/2019 14.0   08/28/2018 14   04/05/2018 12.1     Microalb/Crt.  Ratio (mcg/mg creat)   Date Value   10/05/2017 36 (H)     LDL Cholesterol (mg/dL)   Date Value   03/21/2019 17     AST (U/L)   Date Value   07/16/2018 17     ALT (U/L)   Date Value   07/16/2018 18     BUN (mg/dL)   Date Value   11/20/2018 8      (goal A1C is < 7)   (goal LDL is <100) need 30-50% reduction from baseline     BP Readings from Last 3 Encounters:   05/29/19 (!) 144/89   04/18/19 119/60   03/21/19 125/79    (goal /80)      All Future Testing planned in [facility]:  Lab Frequency Next Occurrence   Microalbumin, Ur Once 12/05/2019       Next Visit Date:  Future Appointments   Date Time Provider Genna Tran   10/24/2019  2:00 PM Godwin Lim MD [facility] IM Steph Shearer            Patient Active Problem List:     Diverticulitis of large intestine with abscess without bleeding     Leukocytosis     Morbid obesity with BMI of 40.0-44.9, adult ([address]     Wound, open, abdominal wall, anterior     Incisional hernia, without

## 2019-09-06 NOTE — TELEPHONE ENCOUNTER
Clotrimazole anti fungal cream pending for refill     Health Maintenance   Topic Date Due    Pneumococcal 0-64 years Vaccine (1 of 1 - PPSV23) 12/26/1967    Hepatitis B Vaccine (1 of 3 - Risk 3-dose series) 12/26/1980    Shingles Vaccine (1 of 2) 12/26/2011    Colon cancer screen colonoscopy  12/26/2011    Diabetic microalbuminuria test  10/05/2018    Diabetic retinal exam  01/29/2019    Diabetic foot exam  04/18/2019    A1C test (Diabetic or Prediabetic)  06/21/2019    Breast cancer screen  06/27/2019    Flu vaccine (1) 09/01/2019    Creatinine monitoring  11/20/2019    Lipid screen  03/21/2020    Potassium monitoring  03/21/2020    Cervical cancer screen  01/10/2023    DTaP/Tdap/Td vaccine (2 - Td) 09/06/2026    Hepatitis C screen  Completed    HIV screen  Completed             (applicable per patient's age: Cancer Screenings, Depression Screening, Fall Risk Screening, Immunizations)    Hemoglobin A1C (%)   Date Value   03/21/2019 14.0   08/28/2018 14   04/05/2018 12.1     Microalb/Crt.  Ratio (mcg/mg creat)   Date Value   10/05/2017 36 (H)     LDL Cholesterol (mg/dL)   Date Value   03/21/2019 17     AST (U/L)   Date Value   07/16/2018 17     ALT (U/L)   Date Value   07/16/2018 18     BUN (mg/dL)   Date Value   11/20/2018 8      (goal A1C is < 7)   (goal LDL is <100) need 30-50% reduction from baseline     BP Readings from Last 3 Encounters:   05/29/19 (!) 144/89   04/18/19 119/60   03/21/19 125/79    (goal /80)      All Future Testing planned in [facility]:  Lab Frequency Next Occurrence   Microalbumin, Ur Once 12/05/2019       Next Visit Date:  Future Appointments   Date Time Provider Genna Tran   10/24/2019  2:00 PM Lenin Moralez MD [facility] MARGOT Mendoza            Patient Active Problem List:     Diverticulitis of large intestine with abscess without bleeding     Leukocytosis     Morbid obesity with BMI of 40.0-44.9, adult ([address]     Wound, open, abdominal wall, anterior     Incisional hernia, without obstruction or gangrene     Pulmonary embolism (HCC)     Essential hypertension     Chronic back pain     Narcotic abuse ([address]     Generalized abdominal pain     SBO (small bowel obstruction) (HCC)     Sigmoid stricture ([address]     Uncontrolled type 2 diabetes mellitus with hyperglycemia, with long-term current use of insulin (HCC)     Hyperglycemia     MIKY (obstructive sleep apnea)     Atelectasis     Hypocalcemia     Vitamin D deficiency     Acute cystitis with hematuria     Hypokalemia     Colon obstruction ([address]

## 2019-09-11 DIAGNOSIS — E11.65 UNCONTROLLED TYPE 2 DIABETES MELLITUS WITH HYPERGLYCEMIA, WITH LONG-TERM CURRENT USE OF INSULIN (HCC): ICD-10-CM

## 2019-09-11 DIAGNOSIS — E11.8 DM (DIABETES MELLITUS) WITH COMPLICATIONS (HCC): ICD-10-CM

## 2019-09-11 DIAGNOSIS — Z79.4 UNCONTROLLED TYPE 2 DIABETES MELLITUS WITH HYPERGLYCEMIA, WITH LONG-TERM CURRENT USE OF INSULIN (HCC): ICD-10-CM

## 2019-09-11 NOTE — TELEPHONE ENCOUNTER
Essential hypertension     Chronic back pain     Narcotic abuse (HCC)     Generalized abdominal pain     SBO (small bowel obstruction) ([address]     Sigmoid stricture ([address]     Uncontrolled type 2 diabetes mellitus with hyperglycemia, with long-term current use of insulin (HCC)     Hyperglycemia     MIKY (obstructive sleep apnea)     Atelectasis     Hypocalcemia     Vitamin D deficiency     Acute cystitis with hematuria     Hypokalemia     Colon obstruction ([address]

## 2019-09-30 ENCOUNTER — CARE COORDINATION (OUTPATIENT)
Dept: CARE COORDINATION | Age: 59
End: 2019-09-30

## 2019-10-10 DIAGNOSIS — K21.9 GASTROESOPHAGEAL REFLUX DISEASE WITHOUT ESOPHAGITIS: ICD-10-CM

## 2019-10-10 RX ORDER — FAMOTIDINE 20 MG/1
TABLET, FILM COATED ORAL
Qty: 60 TABLET | Refills: 2 | Status: SHIPPED | OUTPATIENT
Start: 2019-10-10 | End: 2019-10-24 | Stop reason: SDUPTHER

## 2019-10-10 RX ORDER — DICYCLOMINE HCL 20 MG
TABLET ORAL
Qty: 60 TABLET | Refills: 0 | Status: SHIPPED | OUTPATIENT
Start: 2019-10-10 | End: 2019-10-24 | Stop reason: SDUPTHER

## 2019-10-24 ENCOUNTER — CARE COORDINATION (OUTPATIENT)
Dept: CARE COORDINATION | Age: 59
End: 2019-10-24

## 2019-10-24 ENCOUNTER — OFFICE VISIT (OUTPATIENT)
Dept: INTERNAL MEDICINE | Age: 59
End: 2019-10-24
Payer: COMMERCIAL

## 2019-10-24 VITALS
HEIGHT: 63 IN | WEIGHT: 241 LBS | DIASTOLIC BLOOD PRESSURE: 83 MMHG | HEART RATE: 105 BPM | BODY MASS INDEX: 42.7 KG/M2 | SYSTOLIC BLOOD PRESSURE: 153 MMHG

## 2019-10-24 DIAGNOSIS — Z12.31 ENCOUNTER FOR SCREENING MAMMOGRAM FOR BREAST CANCER: Primary | ICD-10-CM

## 2019-10-24 DIAGNOSIS — E11.21 CONTROLLED TYPE 2 DIABETES MELLITUS WITH DIABETIC NEPHROPATHY, WITHOUT LONG-TERM CURRENT USE OF INSULIN (HCC): ICD-10-CM

## 2019-10-24 DIAGNOSIS — F17.200 SMOKER: ICD-10-CM

## 2019-10-24 DIAGNOSIS — E11.65 UNCONTROLLED TYPE 2 DIABETES MELLITUS WITH HYPERGLYCEMIA, WITH LONG-TERM CURRENT USE OF INSULIN (HCC): ICD-10-CM

## 2019-10-24 DIAGNOSIS — L30.4 INTERTRIGO: ICD-10-CM

## 2019-10-24 DIAGNOSIS — K21.9 GASTROESOPHAGEAL REFLUX DISEASE WITHOUT ESOPHAGITIS: ICD-10-CM

## 2019-10-24 DIAGNOSIS — L23.9 ALLERGIC DERMATITIS: ICD-10-CM

## 2019-10-24 DIAGNOSIS — J42 CHRONIC BRONCHITIS, UNSPECIFIED CHRONIC BRONCHITIS TYPE (HCC): ICD-10-CM

## 2019-10-24 DIAGNOSIS — I10 ESSENTIAL HYPERTENSION: ICD-10-CM

## 2019-10-24 DIAGNOSIS — E78.2 MIXED HYPERLIPIDEMIA: ICD-10-CM

## 2019-10-24 DIAGNOSIS — Z79.4 CONTROLLED TYPE 2 DIABETES MELLITUS WITH HYPERGLYCEMIA, WITH LONG-TERM CURRENT USE OF INSULIN (HCC): ICD-10-CM

## 2019-10-24 DIAGNOSIS — R32 URINARY INCONTINENCE, UNSPECIFIED TYPE: ICD-10-CM

## 2019-10-24 DIAGNOSIS — Z00.00 ANNUAL PHYSICAL EXAM: ICD-10-CM

## 2019-10-24 DIAGNOSIS — E11.8 DM (DIABETES MELLITUS) WITH COMPLICATIONS (HCC): ICD-10-CM

## 2019-10-24 DIAGNOSIS — Z79.4 UNCONTROLLED TYPE 2 DIABETES MELLITUS WITH HYPERGLYCEMIA, WITH LONG-TERM CURRENT USE OF INSULIN (HCC): ICD-10-CM

## 2019-10-24 DIAGNOSIS — E11.65 CONTROLLED TYPE 2 DIABETES MELLITUS WITH HYPERGLYCEMIA, WITH LONG-TERM CURRENT USE OF INSULIN (HCC): ICD-10-CM

## 2019-10-24 LAB — HBA1C MFR BLD: 14 %

## 2019-10-24 PROCEDURE — G8484 FLU IMMUNIZE NO ADMIN: HCPCS | Performed by: STUDENT IN AN ORGANIZED HEALTH CARE EDUCATION/TRAINING PROGRAM

## 2019-10-24 PROCEDURE — G8417 CALC BMI ABV UP PARAM F/U: HCPCS | Performed by: STUDENT IN AN ORGANIZED HEALTH CARE EDUCATION/TRAINING PROGRAM

## 2019-10-24 PROCEDURE — 3017F COLORECTAL CA SCREEN DOC REV: CPT | Performed by: STUDENT IN AN ORGANIZED HEALTH CARE EDUCATION/TRAINING PROGRAM

## 2019-10-24 PROCEDURE — 99213 OFFICE O/P EST LOW 20 MIN: CPT | Performed by: STUDENT IN AN ORGANIZED HEALTH CARE EDUCATION/TRAINING PROGRAM

## 2019-10-24 PROCEDURE — 83036 HEMOGLOBIN GLYCOSYLATED A1C: CPT | Performed by: STUDENT IN AN ORGANIZED HEALTH CARE EDUCATION/TRAINING PROGRAM

## 2019-10-24 PROCEDURE — 4004F PT TOBACCO SCREEN RCVD TLK: CPT | Performed by: STUDENT IN AN ORGANIZED HEALTH CARE EDUCATION/TRAINING PROGRAM

## 2019-10-24 PROCEDURE — G8926 SPIRO NO PERF OR DOC: HCPCS | Performed by: STUDENT IN AN ORGANIZED HEALTH CARE EDUCATION/TRAINING PROGRAM

## 2019-10-24 PROCEDURE — 3046F HEMOGLOBIN A1C LEVEL >9.0%: CPT | Performed by: STUDENT IN AN ORGANIZED HEALTH CARE EDUCATION/TRAINING PROGRAM

## 2019-10-24 PROCEDURE — 2022F DILAT RTA XM EVC RTNOPTHY: CPT | Performed by: STUDENT IN AN ORGANIZED HEALTH CARE EDUCATION/TRAINING PROGRAM

## 2019-10-24 PROCEDURE — 3023F SPIROM DOC REV: CPT | Performed by: STUDENT IN AN ORGANIZED HEALTH CARE EDUCATION/TRAINING PROGRAM

## 2019-10-24 PROCEDURE — G8427 DOCREV CUR MEDS BY ELIG CLIN: HCPCS | Performed by: STUDENT IN AN ORGANIZED HEALTH CARE EDUCATION/TRAINING PROGRAM

## 2019-10-24 RX ORDER — NYSTATIN 100000 U/G
CREAM TOPICAL
Qty: 1 TUBE | Refills: 0 | Status: SHIPPED | OUTPATIENT
Start: 2019-10-24

## 2019-10-24 RX ORDER — INSULIN LISPRO 100 [IU]/ML
25 INJECTION, SOLUTION INTRAVENOUS; SUBCUTANEOUS
Qty: 1 PEN | Refills: 3 | Status: SHIPPED | OUTPATIENT
Start: 2019-10-24 | End: 2020-03-11 | Stop reason: SDUPTHER

## 2019-10-24 RX ORDER — TRIAMCINOLONE ACETONIDE 0.25 MG/G
CREAM TOPICAL
Qty: 1 TUBE | Refills: 3 | Status: SHIPPED | OUTPATIENT
Start: 2019-10-24 | End: 2020-03-11 | Stop reason: SDUPTHER

## 2019-10-24 RX ORDER — PEN NEEDLE, DIABETIC 32 GX 1/4"
NEEDLE, DISPOSABLE MISCELLANEOUS
Qty: 30 EACH | Refills: 3 | Status: SHIPPED | OUTPATIENT
Start: 2019-10-24 | End: 2020-04-02

## 2019-10-24 RX ORDER — ASPIRIN 81 MG/1
TABLET ORAL
Qty: 30 TABLET | Refills: 5 | Status: SHIPPED | OUTPATIENT
Start: 2019-10-24

## 2019-10-24 RX ORDER — GLUCOSAM/CHON-MSM1/C/MANG/BOSW 500-416.6
TABLET ORAL
Qty: 100 EACH | Refills: 5 | Status: SHIPPED | OUTPATIENT
Start: 2019-10-24

## 2019-10-24 RX ORDER — ACETAMINOPHEN 325 MG/1
325 TABLET ORAL EVERY 6 HOURS PRN
Qty: 120 TABLET | Refills: 0 | Status: SHIPPED | OUTPATIENT
Start: 2019-10-24 | End: 2019-12-06 | Stop reason: SDUPTHER

## 2019-10-24 RX ORDER — GLIPIZIDE 5 MG/1
TABLET ORAL
Qty: 60 TABLET | Refills: 3 | Status: SHIPPED | OUTPATIENT
Start: 2019-10-24 | End: 2020-04-02

## 2019-10-24 RX ORDER — CARVEDILOL 3.12 MG/1
TABLET ORAL
Qty: 60 TABLET | Refills: 3 | Status: SHIPPED | OUTPATIENT
Start: 2019-10-24 | End: 2020-04-02

## 2019-10-24 RX ORDER — DICYCLOMINE HCL 20 MG
TABLET ORAL
Qty: 60 TABLET | Refills: 0 | Status: SHIPPED | OUTPATIENT
Start: 2019-10-24 | End: 2020-01-15

## 2019-10-24 RX ORDER — ATORVASTATIN CALCIUM 80 MG/1
TABLET, FILM COATED ORAL
Qty: 30 TABLET | Refills: 3 | Status: SHIPPED | OUTPATIENT
Start: 2019-10-24 | End: 2020-04-02

## 2019-10-24 RX ORDER — CLOTRIMAZOLE 1 %
CREAM (GRAM) TOPICAL
Qty: 14.17 G | Refills: 3 | Status: SHIPPED | OUTPATIENT
Start: 2019-10-24 | End: 2020-02-11

## 2019-10-24 RX ORDER — DIPHENHYDRAMINE HYDROCHLORIDE 12.5 MG/1
12.5 BAR, CHEWABLE ORAL 4 TIMES DAILY PRN
Qty: 20 TABLET | Refills: 0 | Status: SHIPPED | OUTPATIENT
Start: 2019-10-24 | End: 2019-12-06 | Stop reason: SDUPTHER

## 2019-10-24 RX ORDER — FAMOTIDINE 20 MG/1
TABLET, FILM COATED ORAL
Qty: 60 TABLET | Refills: 2 | Status: SHIPPED | OUTPATIENT
Start: 2019-10-24 | End: 2020-03-11 | Stop reason: SDUPTHER

## 2019-10-24 RX ORDER — NICOTINE 21 MG/24HR
1 PATCH, TRANSDERMAL 24 HOURS TRANSDERMAL EVERY 24 HOURS
Qty: 360 PATCH | Refills: 0 | Status: SHIPPED | OUTPATIENT
Start: 2019-10-24 | End: 2020-10-23

## 2019-10-24 RX ORDER — IPRATROPIUM BROMIDE AND ALBUTEROL SULFATE 2.5; .5 MG/3ML; MG/3ML
3 SOLUTION RESPIRATORY (INHALATION) 4 TIMES DAILY PRN
Qty: 360 ML | Refills: 5 | Status: SHIPPED | OUTPATIENT
Start: 2019-10-24

## 2019-10-24 RX ORDER — IBUPROFEN 400 MG/1
400 TABLET ORAL EVERY 6 HOURS PRN
Qty: 120 TABLET | Refills: 0 | Status: SHIPPED | OUTPATIENT
Start: 2019-10-24 | End: 2019-12-06 | Stop reason: SDUPTHER

## 2019-10-24 RX ORDER — ALCOHOL ANTISEPTIC PADS
PADS, MEDICATED (EA) TOPICAL
Qty: 30 EACH | Refills: 1 | Status: SHIPPED | OUTPATIENT
Start: 2019-10-24 | End: 2020-01-15

## 2019-10-25 ENCOUNTER — TELEPHONE (OUTPATIENT)
Dept: INTERNAL MEDICINE | Age: 59
End: 2019-10-25

## 2019-10-25 DIAGNOSIS — Z87.891 PERSONAL HISTORY OF TOBACCO USE, PRESENTING HAZARDS TO HEALTH: Primary | ICD-10-CM

## 2019-10-28 ENCOUNTER — HOSPITAL ENCOUNTER (OUTPATIENT)
Dept: DIABETES SERVICES | Age: 59
Setting detail: THERAPIES SERIES
Discharge: HOME | End: 2019-10-28
Payer: COMMERCIAL

## 2019-10-28 DIAGNOSIS — E11.65 UNCONTROLLED TYPE 2 DIABETES MELLITUS WITH HYPERGLYCEMIA, WITH LONG-TERM CURRENT USE OF INSULIN (HCC): Primary | ICD-10-CM

## 2019-10-28 DIAGNOSIS — Z79.4 UNCONTROLLED TYPE 2 DIABETES MELLITUS WITH HYPERGLYCEMIA, WITH LONG-TERM CURRENT USE OF INSULIN (HCC): Primary | ICD-10-CM

## 2019-10-28 PROCEDURE — G0109 DIAB MANAGE TRN IND/GROUP: HCPCS

## 2019-10-28 SDOH — ECONOMIC STABILITY: FOOD INSECURITY: ADDITIONAL INFORMATION: NO

## 2019-10-28 ASSESSMENT — PROBLEM AREAS IN DIABETES QUESTIONNAIRE (PAID)
FEELING DEPRESSED WHEN YOU THINK ABOUT LIVING WITH DIABETES: 2
FEELING SCARED WHEN YOU THINK ABOUT LIVING WITH DIABETES: 3
FEELING THAT DIABETES IS TAKING UP TOO MUCH OF YOUR MENTAL AND PHYSICAL ENERGY EVERY DAY: 1
WORRYING ABOUT THE FUTURE AND THE POSSIBILITY OF SERIOUS COMPLICATIONS: 1
PAID-5 TOTAL SCORE: 8
COPING WITH COMPLICATIONS OF DIABETES: 1

## 2019-11-04 ENCOUNTER — TELEPHONE (OUTPATIENT)
Dept: ONCOLOGY | Age: 59
End: 2019-11-04

## 2019-11-04 DIAGNOSIS — Z87.891 PERSONAL HISTORY OF NICOTINE DEPENDENCE: Primary | ICD-10-CM

## 2019-11-11 ENCOUNTER — TELEPHONE (OUTPATIENT)
Dept: ONCOLOGY | Age: 59
End: 2019-11-11

## 2019-11-14 ENCOUNTER — HOSPITAL ENCOUNTER (OUTPATIENT)
Dept: CT IMAGING | Age: 59
Discharge: HOME | End: 2019-11-16
Payer: COMMERCIAL

## 2019-11-14 DIAGNOSIS — Z87.891 PERSONAL HISTORY OF TOBACCO USE, PRESENTING HAZARDS TO HEALTH: ICD-10-CM

## 2019-11-14 PROCEDURE — G0297 LDCT FOR LUNG CA SCREEN: HCPCS

## 2019-11-18 DIAGNOSIS — L23.9 ALLERGIC DERMATITIS: ICD-10-CM

## 2019-11-19 ENCOUNTER — HOSPITAL ENCOUNTER (OUTPATIENT)
Age: 59
Setting detail: SPECIMEN
Discharge: HOME | End: 2019-11-19
Payer: COMMERCIAL

## 2019-11-19 DIAGNOSIS — E11.8 DM (DIABETES MELLITUS) WITH COMPLICATIONS (HCC): ICD-10-CM

## 2019-11-19 DIAGNOSIS — I10 ESSENTIAL HYPERTENSION: ICD-10-CM

## 2019-11-19 DIAGNOSIS — Z00.00 ANNUAL PHYSICAL EXAM: ICD-10-CM

## 2019-11-19 LAB
CHOLESTEROL/HDL RATIO: 3.3
CHOLESTEROL: 98 MG/DL
CREATININE URINE: 56.2 MG/DL (ref 28–217)
HDLC SERPL-MCNC: 30 MG/DL
LDL CHOLESTEROL: 6 MG/DL (ref 0–130)
MICROALBUMIN/CREAT 24H UR: <12 MG/L
MICROALBUMIN/CREAT UR-RTO: NORMAL MCG/MG CREAT
TRIGL SERPL-MCNC: 309 MG/DL
VLDLC SERPL CALC-MCNC: ABNORMAL MG/DL (ref 1–30)

## 2019-11-19 PROCEDURE — 80061 LIPID PANEL: CPT

## 2019-11-19 PROCEDURE — 82570 ASSAY OF URINE CREATININE: CPT

## 2019-11-19 PROCEDURE — 82043 UR ALBUMIN QUANTITATIVE: CPT

## 2019-11-19 PROCEDURE — 36415 COLL VENOUS BLD VENIPUNCTURE: CPT

## 2019-11-19 RX ORDER — DIPHENHYDRAMINE HYDROCHLORIDE 12.5 MG/1
12.5 BAR, CHEWABLE ORAL 4 TIMES DAILY PRN
Qty: 60 TABLET | Refills: 3 | OUTPATIENT
Start: 2019-11-19

## 2019-11-20 ENCOUNTER — HOSPITAL ENCOUNTER (OUTPATIENT)
Dept: MAMMOGRAPHY | Age: 59
Discharge: HOME | End: 2019-11-22
Payer: COMMERCIAL

## 2019-11-20 DIAGNOSIS — Z12.31 ENCOUNTER FOR SCREENING MAMMOGRAM FOR BREAST CANCER: ICD-10-CM

## 2019-11-20 PROCEDURE — 77063 BREAST TOMOSYNTHESIS BI: CPT

## 2019-12-06 ENCOUNTER — CARE COORDINATION (OUTPATIENT)
Dept: CARE COORDINATION | Age: 59
End: 2019-12-06

## 2019-12-12 ENCOUNTER — CARE COORDINATION (OUTPATIENT)
Dept: CARE COORDINATION | Age: 59
End: 2019-12-12

## 2019-12-26 ENCOUNTER — CARE COORDINATION (OUTPATIENT)
Dept: CARE COORDINATION | Age: 59
End: 2019-12-26

## 2020-02-11 RX ORDER — CLOTRIMAZOLE 1 %
CREAM (GRAM) TOPICAL
Qty: 100 G | Refills: 0 | Status: SHIPPED | OUTPATIENT
Start: 2020-02-11 | End: 2020-05-01

## 2020-02-20 ENCOUNTER — HOSPITAL ENCOUNTER (OUTPATIENT)
Age: 60
Setting detail: SPECIMEN
Discharge: HOME | End: 2020-02-20
Payer: COMMERCIAL

## 2020-02-20 LAB
ALBUMIN SERPL-MCNC: 3.6 G/DL (ref 3.5–5.2)
ALBUMIN/GLOBULIN RATIO: 0.9 (ref 1–2.5)
ALP BLD-CCNC: 249 U/L (ref 35–104)
ALT SERPL-CCNC: 32 U/L (ref 5–33)
ANION GAP SERPL CALCULATED.3IONS-SCNC: 15 MMOL/L (ref 9–17)
AST SERPL-CCNC: 25 U/L
BILIRUB SERPL-MCNC: <0.1 MG/DL (ref 0.3–1.2)
BUN BLDV-MCNC: 6 MG/DL (ref 6–20)
BUN/CREAT BLD: ABNORMAL (ref 9–20)
CALCIUM SERPL-MCNC: 9.4 MG/DL (ref 8.6–10.4)
CHLORIDE BLD-SCNC: 99 MMOL/L (ref 98–107)
CO2: 21 MMOL/L (ref 20–31)
CREAT SERPL-MCNC: 0.45 MG/DL (ref 0.5–0.9)
GFR AFRICAN AMERICAN: >60 ML/MIN
GFR NON-AFRICAN AMERICAN: >60 ML/MIN
GFR SERPL CREATININE-BSD FRML MDRD: ABNORMAL ML/MIN/{1.73_M2}
GFR SERPL CREATININE-BSD FRML MDRD: ABNORMAL ML/MIN/{1.73_M2}
GLUCOSE BLD-MCNC: 322 MG/DL (ref 70–99)
POTASSIUM SERPL-SCNC: 3.9 MMOL/L (ref 3.7–5.3)
SODIUM BLD-SCNC: 135 MMOL/L (ref 135–144)
TOTAL PROTEIN: 7.8 G/DL (ref 6.4–8.3)

## 2020-02-20 PROCEDURE — 80053 COMPREHEN METABOLIC PANEL: CPT

## 2020-02-20 PROCEDURE — 36415 COLL VENOUS BLD VENIPUNCTURE: CPT

## 2020-03-06 NOTE — TELEPHONE ENCOUNTER
Request for insulin lispro, kenalog, pepcid, bentyl, ibuprofen, tylenol - medications pended. Patient left last OV without being seen. All labs completed. Please fill if appropriate. Next Visit Date:  No future appointments. Health Maintenance   Topic Date Due    Colon cancer screen colonoscopy  12/26/2011    Pneumococcal 0-64 years Vaccine (1 of 1 - PPSV23) 04/24/2020 (Originally 12/26/1966)    Hepatitis B vaccine (1 of 3 - Risk 3-dose series) 10/24/2020 (Originally 12/26/1980)    Flu vaccine (1) 10/24/2020 (Originally 9/1/2019)    Shingles Vaccine (1 of 2) 10/24/2020 (Originally 12/26/2011)    A1C test (Diabetic or Prediabetic)  05/20/2020    Diabetic retinal exam  09/10/2020    Diabetic foot exam  10/24/2020    Low dose CT lung screening  11/14/2020    Diabetic microalbuminuria test  11/19/2020    Lipid screen  11/19/2020    Potassium monitoring  02/20/2021    Creatinine monitoring  02/20/2021    Breast cancer screen  11/20/2021    Cervical cancer screen  01/10/2023    DTaP/Tdap/Td vaccine (2 - Td) 09/06/2026    Hepatitis C screen  Completed    HIV screen  Completed    Hepatitis A vaccine  Aged Out    Hib vaccine  Aged Out    Meningococcal (ACWY) vaccine  Aged Out       Hemoglobin A1C (%)   Date Value   02/20/2020 14.0   10/24/2019 14.0   03/21/2019 14.0             ( goal A1C is < 7)   Microalb/Crt.  Ratio (mcg/mg creat)   Date Value   11/19/2019 CANNOT BE CALCULATED     LDL Cholesterol (mg/dL)   Date Value   11/19/2019 6       (goal LDL is <100)   AST (U/L)   Date Value   02/20/2020 25     ALT (U/L)   Date Value   02/20/2020 32     BUN (mg/dL)   Date Value   02/20/2020 6     BP Readings from Last 3 Encounters:   02/20/20 113/73   10/24/19 (!) 153/83   05/29/19 (!) 144/89          (goal 120/80)    All Future Testing planned in [facility]        Patient Active Problem List:     Diverticulitis of large intestine with abscess without bleeding     Leukocytosis     Morbid obesity with BMI of

## 2020-03-11 ENCOUNTER — TELEPHONE (OUTPATIENT)
Dept: INTERNAL MEDICINE | Age: 60
End: 2020-03-11

## 2020-03-11 RX ORDER — INSULIN LISPRO 100 [IU]/ML
INJECTION, SOLUTION INTRAVENOUS; SUBCUTANEOUS
Qty: 15 ML | Refills: 5 | Status: SHIPPED | OUTPATIENT
Start: 2020-03-11 | End: 2020-04-10

## 2020-03-11 RX ORDER — DICYCLOMINE HCL 20 MG
TABLET ORAL
Qty: 90 TABLET | Refills: 2 | Status: SHIPPED | OUTPATIENT
Start: 2020-03-11 | End: 2020-04-10

## 2020-03-11 RX ORDER — IBUPROFEN 400 MG/1
TABLET ORAL
Qty: 120 TABLET | Refills: 2 | Status: SHIPPED | OUTPATIENT
Start: 2020-03-11 | End: 2020-04-10

## 2020-03-11 RX ORDER — TRIAMCINOLONE ACETONIDE 0.25 MG/G
CREAM TOPICAL
Qty: 1 TUBE | Refills: 3 | Status: SHIPPED | OUTPATIENT
Start: 2020-03-11

## 2020-03-11 RX ORDER — ACETAMINOPHEN 325 MG/1
TABLET ORAL
Qty: 120 TABLET | Status: SHIPPED | OUTPATIENT
Start: 2020-03-11 | End: 2020-04-10

## 2020-03-11 RX ORDER — FAMOTIDINE 20 MG/1
TABLET, FILM COATED ORAL
Qty: 60 TABLET | Refills: 2 | Status: SHIPPED | OUTPATIENT
Start: 2020-03-11 | End: 2020-03-13 | Stop reason: ALTCHOICE

## 2020-03-11 NOTE — TELEPHONE ENCOUNTER
Received PC from pharmacy - Pepcid 20mg is currently unavailable, requesting to be changed to either Ranitidine 150mg or Pepcid 40mg. Please change medication and send to pharmacy.

## 2020-03-13 RX ORDER — RANITIDINE 150 MG/1
150 TABLET ORAL 2 TIMES DAILY
Qty: 100 TABLET | Refills: 1 | Status: SHIPPED | OUTPATIENT
Start: 2020-03-13

## 2020-04-03 RX ORDER — GLIPIZIDE 5 MG/1
TABLET ORAL
Qty: 60 TABLET | Refills: 3 | Status: SHIPPED | OUTPATIENT
Start: 2020-04-03

## 2020-04-03 RX ORDER — INSULIN GLARGINE 100 [IU]/ML
INJECTION, SOLUTION SUBCUTANEOUS
Qty: 30 ML | Refills: 5 | Status: SHIPPED | OUTPATIENT
Start: 2020-04-03

## 2020-04-03 RX ORDER — ATORVASTATIN CALCIUM 80 MG/1
TABLET, FILM COATED ORAL
Qty: 30 TABLET | Refills: 3 | Status: SHIPPED | OUTPATIENT
Start: 2020-04-03

## 2020-04-03 RX ORDER — CARVEDILOL 3.12 MG/1
TABLET ORAL
Qty: 60 TABLET | Refills: 3 | Status: SHIPPED | OUTPATIENT
Start: 2020-04-03

## 2020-04-03 RX ORDER — PEN NEEDLE, DIABETIC 32 GX 1/4"
NEEDLE, DISPOSABLE MISCELLANEOUS
Qty: 100 EACH | Refills: 5 | Status: SHIPPED | OUTPATIENT
Start: 2020-04-03

## 2020-04-03 RX ORDER — DIPHENHYDRAMINE HCL 25 MG
25 TABLET ORAL EVERY 8 HOURS PRN
Qty: 30 TABLET | Refills: 5 | Status: SHIPPED | OUTPATIENT
Start: 2020-04-03

## 2020-05-01 RX ORDER — FAMOTIDINE 20 MG/1
TABLET, FILM COATED ORAL
Qty: 60 TABLET | Refills: 2 | Status: SHIPPED | OUTPATIENT
Start: 2020-05-01

## 2020-05-01 RX ORDER — CLOTRIMAZOLE 1 %
CREAM (GRAM) TOPICAL
Qty: 1 TUBE | Refills: 3 | Status: SHIPPED | OUTPATIENT
Start: 2020-05-01

## 2021-06-30 NOTE — TELEPHONE ENCOUNTER
PC to pt LM on VM to contact office
Pt calling back stating she also received medication for a nebulizer but has never gotten a nebulizer machine. Pt would like to know if one could be ordered.  Please advise and order if appropriate
VM left for patient to call the office for message from md  Letter mailed to patient, will await patient contact
escribed insulin syringes, alcohol swabs and nebulizer kit. I will not prescribe tramadol at this time. Thanks.
[FreeTextEntry1] : 17 yo male pmh autism, ADHD, elevated BMI who presents acutely for evaluation of skin rash and pain in both knees. PE shows likely tinea versicolor, selenium sulfide 2.5% shampoo called into pharmacy (unable to e-prescribe at this time). Instructions to use once daily, leave on for 10 minutes and to use for total of 7 days. Derm referral provided. Normal exam of bilateral knees, however reported bilateral knee pain with activity and in setting of elevated BMI will obtain bilateral knee Xrays. Recommended rest, icing and elevation. Orthopedic referral also provided for further evaluation. RTC for worsened or persistent rash or knee pain otherwise follow as scheduled for weight check in August. Bubbles in urine likely related to Moo urinating while standing and recent U/A with only trace proteinuria. Will repeat U/A and BMP at next well visit. Grandmother declining nephrology referral at this time, will re-assess at next well visit within 6 months.\par \par Caretaker expressed understanding of the plan and agrees. All questions were answered.\par \par \par

## 2021-08-10 ENCOUNTER — APPOINTMENT (OUTPATIENT)
Dept: CT IMAGING | Age: 61
DRG: 254 | End: 2021-08-10
Payer: COMMERCIAL

## 2021-08-10 ENCOUNTER — HOSPITAL ENCOUNTER (INPATIENT)
Age: 61
LOS: 2 days | Discharge: HOME | DRG: 254 | End: 2021-08-13
Attending: EMERGENCY MEDICINE | Admitting: FAMILY MEDICINE
Payer: COMMERCIAL

## 2021-08-10 DIAGNOSIS — K56.600 PARTIAL SMALL BOWEL OBSTRUCTION (HCC): Primary | ICD-10-CM

## 2021-08-10 LAB
ABSOLUTE EOS #: 0.25 K/UL (ref 0–0.4)
ABSOLUTE IMMATURE GRANULOCYTE: 0 K/UL (ref 0–0.3)
ABSOLUTE LYMPH #: 3.75 K/UL (ref 1–4.8)
ABSOLUTE MONO #: 0.5 K/UL (ref 0.1–0.8)
ALBUMIN SERPL-MCNC: 4 G/DL (ref 3.5–5.2)
ALBUMIN/GLOBULIN RATIO: 0.9 (ref 1–2.5)
ALP BLD-CCNC: 207 U/L (ref 35–104)
ALT SERPL-CCNC: 13 U/L (ref 5–33)
ANION GAP SERPL CALCULATED.3IONS-SCNC: 13 MMOL/L (ref 9–17)
AST SERPL-CCNC: 17 U/L
BASOPHILS # BLD: 0 % (ref 0–2)
BASOPHILS ABSOLUTE: 0 K/UL (ref 0–0.2)
BILIRUB SERPL-MCNC: 0.35 MG/DL (ref 0.3–1.2)
BILIRUBIN DIRECT: 0.08 MG/DL
BILIRUBIN, INDIRECT: 0.27 MG/DL (ref 0–1)
BUN BLDV-MCNC: 10 MG/DL (ref 6–20)
BUN/CREAT BLD: ABNORMAL (ref 9–20)
CALCIUM SERPL-MCNC: 9.6 MG/DL (ref 8.6–10.4)
CHLORIDE BLD-SCNC: 96 MMOL/L (ref 98–107)
CO2: 24 MMOL/L (ref 20–31)
CREAT SERPL-MCNC: 0.69 MG/DL (ref 0.5–0.9)
DIFFERENTIAL TYPE: ABNORMAL
EOSINOPHILS RELATIVE PERCENT: 1 % (ref 1–4)
GFR AFRICAN AMERICAN: >60 ML/MIN
GFR NON-AFRICAN AMERICAN: >60 ML/MIN
GFR SERPL CREATININE-BSD FRML MDRD: ABNORMAL ML/MIN/{1.73_M2}
GFR SERPL CREATININE-BSD FRML MDRD: ABNORMAL ML/MIN/{1.73_M2}
GLOBULIN: ABNORMAL G/DL (ref 1.5–3.8)
GLUCOSE BLD-MCNC: 292 MG/DL (ref 70–99)
HCT VFR BLD CALC: 51.9 % (ref 36.3–47.1)
HEMOGLOBIN: 16.8 G/DL (ref 11.9–15.1)
IMMATURE GRANULOCYTES: 0 %
LACTIC ACID, WHOLE BLOOD: 1.2 MMOL/L (ref 0.7–2.1)
LIPASE: 9 U/L (ref 13–60)
LYMPHOCYTES # BLD: 15 % (ref 24–44)
MCH RBC QN AUTO: 28 PG (ref 25.2–33.5)
MCHC RBC AUTO-ENTMCNC: 32.4 G/DL (ref 28.4–34.8)
MCV RBC AUTO: 86.6 FL (ref 82.6–102.9)
MONOCYTES # BLD: 2 % (ref 1–7)
MORPHOLOGY: NORMAL
NRBC AUTOMATED: 0 PER 100 WBC
PDW BLD-RTO: 12.9 % (ref 11.8–14.4)
PLATELET # BLD: 399 K/UL (ref 138–453)
PLATELET ESTIMATE: ABNORMAL
PMV BLD AUTO: 10.6 FL (ref 8.1–13.5)
POTASSIUM SERPL-SCNC: 4.2 MMOL/L (ref 3.7–5.3)
RBC # BLD: 5.99 M/UL (ref 3.95–5.11)
RBC # BLD: ABNORMAL 10*6/UL
SEG NEUTROPHILS: 82 % (ref 36–66)
SEGMENTED NEUTROPHILS ABSOLUTE COUNT: 20.5 K/UL (ref 1.8–7.7)
SODIUM BLD-SCNC: 133 MMOL/L (ref 135–144)
TOTAL PROTEIN: 8.7 G/DL (ref 6.4–8.3)
WBC # BLD: 25 K/UL (ref 3.5–11.3)
WBC # BLD: ABNORMAL 10*3/UL

## 2021-08-10 PROCEDURE — 96375 TX/PRO/DX INJ NEW DRUG ADDON: CPT

## 2021-08-10 PROCEDURE — 96365 THER/PROPH/DIAG IV INF INIT: CPT

## 2021-08-10 PROCEDURE — 96376 TX/PRO/DX INJ SAME DRUG ADON: CPT

## 2021-08-10 PROCEDURE — 6360000002 HC RX W HCPCS: Performed by: STUDENT IN AN ORGANIZED HEALTH CARE EDUCATION/TRAINING PROGRAM

## 2021-08-10 PROCEDURE — 74177 CT ABD & PELVIS W/CONTRAST: CPT

## 2021-08-10 PROCEDURE — 85025 COMPLETE CBC W/AUTO DIFF WBC: CPT

## 2021-08-10 PROCEDURE — 99285 EMERGENCY DEPT VISIT HI MDM: CPT

## 2021-08-10 PROCEDURE — 80076 HEPATIC FUNCTION PANEL: CPT

## 2021-08-10 PROCEDURE — 2580000003 HC RX 258: Performed by: STUDENT IN AN ORGANIZED HEALTH CARE EDUCATION/TRAINING PROGRAM

## 2021-08-10 PROCEDURE — 80048 BASIC METABOLIC PNL TOTAL CA: CPT

## 2021-08-10 PROCEDURE — 87040 BLOOD CULTURE FOR BACTERIA: CPT

## 2021-08-10 PROCEDURE — 83605 ASSAY OF LACTIC ACID: CPT

## 2021-08-10 PROCEDURE — 96368 THER/DIAG CONCURRENT INF: CPT

## 2021-08-10 PROCEDURE — 2500000003 HC RX 250 WO HCPCS: Performed by: STUDENT IN AN ORGANIZED HEALTH CARE EDUCATION/TRAINING PROGRAM

## 2021-08-10 PROCEDURE — 96361 HYDRATE IV INFUSION ADD-ON: CPT

## 2021-08-10 PROCEDURE — 83690 ASSAY OF LIPASE: CPT

## 2021-08-10 PROCEDURE — 6360000004 HC RX CONTRAST MEDICATION: Performed by: STUDENT IN AN ORGANIZED HEALTH CARE EDUCATION/TRAINING PROGRAM

## 2021-08-10 PROCEDURE — 93005 ELECTROCARDIOGRAM TRACING: CPT | Performed by: STUDENT IN AN ORGANIZED HEALTH CARE EDUCATION/TRAINING PROGRAM

## 2021-08-10 RX ORDER — MORPHINE SULFATE 4 MG/ML
4 INJECTION, SOLUTION INTRAMUSCULAR; INTRAVENOUS ONCE
Status: COMPLETED | OUTPATIENT
Start: 2021-08-10 | End: 2021-08-10

## 2021-08-10 RX ORDER — 0.9 % SODIUM CHLORIDE 0.9 %
1000 INTRAVENOUS SOLUTION INTRAVENOUS ONCE
Status: COMPLETED | OUTPATIENT
Start: 2021-08-10 | End: 2021-08-10

## 2021-08-10 RX ADMIN — SODIUM CHLORIDE 1000 ML: 9 INJECTION, SOLUTION INTRAVENOUS at 20:26

## 2021-08-10 RX ADMIN — MORPHINE SULFATE 4 MG: 4 INJECTION INTRAVENOUS at 21:42

## 2021-08-10 RX ADMIN — SODIUM CHLORIDE 1000 ML: 9 INJECTION, SOLUTION INTRAVENOUS at 19:06

## 2021-08-10 RX ADMIN — METRONIDAZOLE 500 MG: 500 INJECTION, SOLUTION INTRAVENOUS at 21:42

## 2021-08-10 RX ADMIN — MORPHINE SULFATE 4 MG: 4 INJECTION INTRAVENOUS at 19:02

## 2021-08-10 RX ADMIN — IOPAMIDOL 75 ML: 755 INJECTION, SOLUTION INTRAVENOUS at 20:58

## 2021-08-10 RX ADMIN — CEFTRIAXONE SODIUM 1000 MG: 1 INJECTION, POWDER, FOR SOLUTION INTRAMUSCULAR; INTRAVENOUS at 20:27

## 2021-08-10 ASSESSMENT — ENCOUNTER SYMPTOMS
BLOOD IN STOOL: 0
SHORTNESS OF BREATH: 0
VOMITING: 1
COUGH: 0
ABDOMINAL PAIN: 1
SORE THROAT: 0
NAUSEA: 1
DIARRHEA: 1

## 2021-08-10 ASSESSMENT — PAIN SCALES - GENERAL
PAINLEVEL_OUTOF10: 10
PAINLEVEL_OUTOF10: 7
PAINLEVEL_OUTOF10: 10

## 2021-08-10 ASSESSMENT — PAIN DESCRIPTION - PAIN TYPE: TYPE: ACUTE PAIN

## 2021-08-10 ASSESSMENT — PAIN DESCRIPTION - LOCATION: LOCATION: ABDOMEN

## 2021-08-10 NOTE — ED PROVIDER NOTES
[facility]  ED  Emergency Department Encounter  EmergencyMedicine Resident     Pt Clifford Parra Ronald Valerio  MRN: [id number]  [place] 1960  Date of evaluation: 8/10/21  PCP:  No primary care provider on file. CHIEF COMPLAINT       Chief Complaint   Patient presents with    Abdominal Pain    Nausea    Emesis       HISTORY OF PRESENT ILLNESS  (Location/Symptom, Timing/Onset, Context/Setting, Quality, Duration, Modifying Factors, Severity.)      Tomeka Brito is a 62 y.o. female who presents with acute abdominal pain, nausea, vomiting, watery diarrhea. Patient has a past medical history of abdominal hernias, status post repair complicated by small bowel obstruction with subsequent colostomy bag placement. She states last night she developed some abdominal pain and vomited once. When she woke up this morning the pain continuously intensified, she has had continued vomiting and profuse watery diarrhea. No blood in stool or emesis. Pain is diffuse, does not radiate. No urinary complaints. She does endorse chills but no fevers. No recent antibiotic use, never had C. difficile in the past, not on a PPI. PAST MEDICAL / SURGICAL / SOCIAL / FAMILY HISTORY      has a past medical history of Allergic rhinitis, Arthritis, Chronic back pain, Diverticulitis, GERD (gastroesophageal reflux disease), Headache(784.0), Hyperlipidemia, Hypertension, Knee pain, MDRO (multiple drug resistant organisms) resistance, Narcotic abuse ([facility] [address] Obesity, Osteoarthritis, Rectovaginal fistula, Type II or unspecified type diabetes mellitus without mention of complication, not stated as uncontrolled, and Urinary incontinence. has a past surgical history that includes Knee Arthroplasty; Abdomen surgery (5/1/16); ventral hernia repair (625924); Abdomen surgery (01/21/2014); and Abdominal exploration surgery (01/02/2017).     Social History     Socioeconomic History    Marital status: Single     Spouse name: Not on file    clotrimazole (LOTRIMIN) 1 % cream APPLY TO AFFECTED AREA TWICE DAILY 5/1/20   Vernell Brown MD   diphenhydrAMINE (GNP ALLERGY) 25 MG tablet Take 1 tablet by mouth every 8 hours as needed for Itching 4/3/20   Vernell Brown MD   glipiZIDE (GLUCOTROL) 5 MG tablet TAKE ONE TABLET BY MOUTH TWICE DAILY 4/3/20   Vernell Brown MD   carvedilol (COREG) 3.125 MG tablet TAKE ONE TABLET BY MOUTH TWICE DAILY 4/3/20   Vernell Brown MD   LANTUS SOLOSTAR 100 UNIT/ML injection pen INJECT 100 UNITS UNDER THE SKIN NIGHTLY 4/3/20   Vernell Brown MD   EASY TOUCH PEN NEEDLES 32G X 6 MM MISC USE AS DIRECTED 4/3/20   Vernell Brown MD   atorvastatin (LIPITOR) 80 MG tablet TAKE ONE TABLET BY MOUTH DAILY 4/3/20   Vernell Brown MD   raNITIdine (ZANTAC) 150 MG tablet Take 1 tablet by mouth 2 times daily 3/13/20   Vernell Brown MD   acetaminophen (TYLENOL) 325 MG tablet TAKE ONE TABLET BY MOUTH EVERY SIX HOURS AS NEEDED FOR PAIN 3/11/20 4/10/20  Vernell Brown MD   ibuprofen (ADVIL;MOTRIN) 400 MG tablet TAKE ONE TABLET BY MOUTH EVERY SIX HOURS AS NEEDED FOR PAIN 3/11/20 4/10/20  Vernell Brown MD   dicyclomine (BENTYL) 20 MG tablet TAKE ONE TABLET BY MOUTH THREE TIMES DAILY AS NEEDED FOR ABDOMINAL PAIN 3/11/20 4/10/20  Vernell Brown MD   triamcinolone (KENALOG) 0.025 % cream APPLY TO AFFECTED AREA TWICE DAILY 3/11/20   Vernell Brown MD   insulin lispro, 1 Unit Dial, 100 UNIT/ML SOPN INJECT 25 UNITS SUBCUTANEOUSLY THREE TIMES DAILY BEFORE MEALS 3/11/20 4/10/20  Vernell Brown MD   ULTICARE ALCOHOL SWABS 70 % PADS USE AS DIRECTED 1/15/20   Vernell Brown MD   nystatin (MYCOSTATIN) 057948 UNIT/GM cream Apply topically 2 times daily.  10/24/19   Vernell Brown MD   aspirin (ASPIRIN LOW DOSE) 81 MG EC tablet TAKE ONE TABLET BY MOUTH DAILY 10/24/19   Vernell Brown MD   Incontinence Supplies MISC Use as needed for incontinence 10/24/19   Vernell Brown MD   ipratropium-albuterol (DUONEB) 0.5-2.5 (3) MG/3ML SOLN nebulizer solution Inhale 3 mLs into the lungs 4 times daily as needed for Shortness of Breath 10/24/19   Denisse Charles MD   nicotine (NICODERM CQ) 21 MG/24HR Place 1 patch onto the skin every 24 hours 10/24/19 10/23/20  Denisse Charles MD   TRUEPLUS LANCETS 33G MISC USE AS DIRECTED THREE TIMES DAILY 10/24/19   Denisse Charles MD   blood glucose test strips (TRUE METRIX BLOOD GLUCOSE TEST) strip TEST BLOOD SUGAR DAILY AS DIRECTED 10/24/19   Denisse Charles MD   SURE COMFORT PEN NEEDLES 32G X 4 MM MISC USE AS DIRECTED 7/11/19   Denisse Charles MD   metFORMIN (GLUCOPHAGE) 1000 MG tablet TAKE ONE TABLET BY MOUTH TWICE DAILY WITH MEALS 5/1/18   Dilip Asif MD       REVIEW OF SYSTEMS    (2-9 systems for level 4, 10 or more for level 5)      Review of Systems   Constitutional: Positive for chills. Negative for fever. HENT: Negative for congestion and sore throat. Respiratory: Negative for cough and shortness of breath. Cardiovascular: Negative for chest pain. Gastrointestinal: Positive for abdominal pain, diarrhea, nausea and vomiting. Negative for blood in stool. Genitourinary: Negative for dysuria and frequency. Musculoskeletal: Negative for neck stiffness. Skin: Negative for rash. Allergic/Immunologic: Positive for immunocompromised state (DM). Neurological: Negative for weakness and headaches. PHYSICAL EXAM   (up to 7 for level 4, 8 or more for level 5)      INITIAL VITALS:   /76   Pulse 118   Temp 96.8 °F (36 °C) (Tympanic)   Resp 16   Ht 5' 3\" (1.6 m)   Wt 194 lb 11.2 oz (88.3 kg)   SpO2 98%   BMI 34.49 kg/m²      Vitals:    08/10/21 1646 08/10/21 1652   BP: 102/76    Pulse: 118    Resp: 16    Temp: 96.8 °F (36 °C)    TempSrc: Tympanic    SpO2: 98%    Weight:  194 lb 11.2 oz (88.3 kg)   Height: 5' 3\" (1.6 m)         Physical Exam  Vitals reviewed. Constitutional:       General: She is not in acute distress. Appearance: She is well-developed. She is not ill-appearing, toxic-appearing or diaphoretic. HENT:      Head: Normocephalic and atraumatic. Mouth/Throat:      Mouth: Mucous membranes are dry. Eyes:      Pupils: Pupils are equal, round, and reactive to light. Cardiovascular:      Rate and Rhythm: Normal rate and regular rhythm. Pulmonary:      Effort: Pulmonary effort is normal.      Breath sounds: Normal breath sounds. Abdominal:      General: There is no distension. Palpations: Abdomen is soft. Tenderness: There is generalized abdominal tenderness. There is no guarding or rebound. Musculoskeletal:         General: Normal range of motion. Cervical back: Normal range of motion and neck supple. Skin:     General: Skin is warm and dry. Neurological:      General: No focal deficit present. Mental Status: She is alert and oriented to person, place, and time.          DIFFERENTIAL  DIAGNOSIS     PLAN (LABS / IMAGING / EKG):  Orders Placed This Encounter   Procedures    Culture, Blood 1    Culture, Blood 1    CT ABDOMEN PELVIS W IV CONTRAST Additional Contrast? None    Basic Metabolic Panel w/ Reflex to MG    CBC Auto Differential    Hepatic Function Panel    Lipase    Urinalysis Reflex to Culture    Lactic Acid, Whole Blood    Lactic Acid, Whole Blood    Diet NPO    Inpatient consult to General Surgery    EKG 12 Lead       MEDICATIONS ORDERED:  Orders Placed This Encounter   Medications    0.9 % sodium chloride bolus    morphine injection 4 mg    0.9 % sodium chloride bolus    cefTRIAXone (ROCEPHIN) 1000 mg IVPB in 50 mL D5W minibag     Order Specific Question:   Antimicrobial Indications     Answer:   Infectious Diarrhea    metronidazole (FLAGYL) 500 mg in NaCl 100 mL IVPB premix     Order Specific Question:   Antimicrobial Indications     Answer:   Intra-Abdominal Infection    iopamidol (ISOVUE-370) 76 % injection 75 mL    morphine injection 4 mg       DIAGNOSTIC RESULTS / EMERGENCY DEPARTMENT COURSE / MDM   LAB RESULTS:  Results for orders placed or performed during the hospital encounter of 08/10/21 Basic Metabolic Panel w/ Reflex to MG   Result Value Ref Range    Glucose 292 (H) 70 - 99 mg/dL    BUN 10 6 - 20 mg/dL    CREATININE 0.69 0.50 - 0.90 mg/dL    Bun/Cre Ratio NOT REPORTED 9 - 20    Calcium 9.6 8.6 - 10.4 mg/dL    Sodium 133 (L) 135 - 144 mmol/L    Potassium 4.2 3.7 - 5.3 mmol/L    Chloride 96 (L) 98 - 107 mmol/L    CO2 24 20 - 31 mmol/L    Anion Gap 13 9 - 17 mmol/L    GFR Non-African American >60 >60 mL/min    GFR African American >60 >60 mL/min    GFR Comment          GFR Staging NOT REPORTED    CBC Auto Differential   Result Value Ref Range    WBC 25.0 (H) 3.5 - 11.3 k/uL    RBC 5.99 (H) 3.95 - 5.11 m/uL    Hemoglobin 16.8 (H) 11.9 - 15.1 g/dL    Hematocrit 51.9 (H) 36.3 - 47.1 %    MCV 86.6 82.6 - 102.9 fL    MCH 28.0 25.2 - 33.5 pg    MCHC 32.4 28.4 - 34.8 g/dL    RDW 12.9 11.8 - 14.4 %    Platelets 067 509 - 645 k/uL    MPV 10.6 8.1 - 13.5 fL    NRBC Automated 0.0 0.0 per 100 WBC    Differential Type NOT REPORTED     WBC Morphology NOT REPORTED     RBC Morphology NOT REPORTED     Platelet Estimate NOT REPORTED     Immature Granulocytes 0 0 %    Seg Neutrophils 82 (H) 36 - 66 %    Lymphocytes 15 (L) 24 - 44 %    Monocytes 2 1 - 7 %    Eosinophils % 1 1 - 4 %    Basophils 0 0 - 2 %    Absolute Immature Granulocyte 0.00 0.00 - 0.30 k/uL    Segs Absolute 20.50 (H) 1.8 - 7.7 k/uL    Absolute Lymph # 3.75 1.0 - 4.8 k/uL    Absolute Mono # 0.50 0.1 - 0.8 k/uL    Absolute Eos # 0.25 0.0 - 0.4 k/uL    Basophils Absolute 0.00 0.0 - 0.2 k/uL    Morphology Normal    Hepatic Function Panel   Result Value Ref Range    Albumin 4.0 3.5 - 5.2 g/dL    Alkaline Phosphatase 207 (H) 35 - 104 U/L    ALT 13 5 - 33 U/L    AST 17 <32 U/L    Total Bilirubin 0.35 0.3 - 1.2 mg/dL    Bilirubin, Direct 0.08 <0.31 mg/dL    Bilirubin, Indirect 0.27 0.00 - 1.00 mg/dL    Total Protein 8.7 (H) 6.4 - 8.3 g/dL    Globulin NOT REPORTED 1.5 - 3.8 g/dL    Albumin/Globulin Ratio 0.9 (L) 1.0 - 2.5   Lipase   Result Value Ref Range tolerates ceftriaxone. Ceftriaxone and metronidazole ordered. [CS]   2130 Pain is starting to come back. Will redosed with morphine. Awaiting CT scan prior admission.    [CS]   2150 IMPRESSION:  Status post left hemicolectomy with Donna's pouch noted and left     lower quadrant colostomy with associated parastomal hernia. Fluid-filled  dilated loops of small bowel seen within the hernia sac as well as mild  amount of surrounding fluid most likely reflecting a partial small bowel  obstruction.     Cholelithiasis     Mildly fatty liver   CT ABDOMEN PELVIS W IV CONTRAST Additional Contrast? None [CS]   2150 Surgery consulted, plan for admission for partial small bowel obstruction. + or - NG tube placement per surgery. [CS]      ED Course User Index  [CS] Jorge Luis Yin DO         PROCEDURES:      CONSULTS:  IP CONSULT TO GENERAL SURGERY    CRITICAL CARE:  Please see attending note    FINAL IMPRESSION      1. Partial small bowel obstruction ([address]          DISPOSITION / PLAN     DISPOSITION Decision To Admit 08/10/2021 09:50:49 PM      PATIENT REFERRED TO:  No follow-up provider specified.     DISCHARGE MEDICATIONS:  New Prescriptions    No medications on file       Jorge Luis Yin DO  Emergency Medicine Resident    (Please note that portions of thisnote were completed with a voice recognition program.  Efforts were made to edit the dictations but occasionally words are mis-transcribed.)        Jorge Luis Yin DO  Resident  08/10/21 [id number]

## 2021-08-10 NOTE — ED PROVIDER NOTES
[facility]  Emergency Department  Faculty Attestation     I performed a history and physical examination of the patient and discussed management with the resident. I reviewed the residents note and agree with the documented findings and plan of care. Any areas of disagreement are noted on the chart. I was personally present for the key portions of any procedures. I have documented in the chart those procedures where I was not present during the key portions. I have reviewed the emergency nurses triage note. I agree with the chief complaint, past medical history, past surgical history, allergies, medications, social and family history as documented unless otherwise noted below. For Physician Assistant/ Nurse Practitioner cases/documentation I have personally evaluated this patient and have completed at least one if not all key elements of the E/M (history, physical exam, and MDM). Additional findings are as noted. Primary Care Physician:  No primary care provider on file. Screenings:  [unfilled]    CHIEF COMPLAINT       Chief Complaint   Patient presents with    Abdominal Pain    Nausea    Emesis       RECENT VITALS:   Temp: 96.8 °F (36 °C),  Pulse: 118, Resp: 16, BP: 102/76    LABS:  Labs Reviewed   BASIC METABOLIC PANEL W/ REFLEX TO MG FOR LOW K   CBC WITH AUTO DIFFERENTIAL   HEPATIC FUNCTION PANEL   LIPASE   URINE RT REFLEX TO CULTURE   LACTIC ACID, WHOLE BLOOD       Radiology  CT ABDOMEN PELVIS W IV CONTRAST Additional Contrast? None    (Results Pending)       CRITICAL CARE: There was a high probability of clinically significant/life threatening deterioration in this patient's condition which required my urgent intervention. Total critical care time was 10 minutes. This excludes any time for separately reportable procedures. EKG:      Attending Physician Additional  Notes    Patient has diffuse abdominal pain since yesterday, nausea, vomiting.   She is on multiple abdominal surgeries, prior colonic obstructions, diverticulitis, ostomy. No urine symptoms. No flank pain. No fevers. On exam she is uncomfortable, states pain is 12/10 in intensity, afebrile, anicteric, tachycardic, borderline blood pressure. No CVA tenderness. Skin is warm and dry. Normal cap refill. Abdomen is minimally distended, mild diffuse tenderness without rebound or guarding. Impression is dehydration, possible bowel obstruction, acute abdomen. Plan is IV access, fluids, analgesics, antiemetics, CT abdomen, reassess, anticipate admission. Arlys Plants.  Connie Montana MD, [facility]  Attending Emergency  Physician               Viola Mary MD  08/10/21 [id number]

## 2021-08-10 NOTE — ED TRIAGE NOTES
Pt with x 2days of ABD pain. H/o multiple ABD sx, last sx ostomy was created. Pt feels like pain is sharp in nature she has been vomiting but denies any blood. Tried taking pepto and pepcid without relief. Having slightly increased output for ostomy.

## 2021-08-10 NOTE — Clinical Note
Patient Class: Inpatient [101]   REQUIRED: Diagnosis: SBO (small [facility] obstruction) ([facility] [516280]   Estimated Length of Stay: Estimated stay of more than 2 midnights   Admitting Provider: Kassie Reyes [[id number]]   Telemetry/Cardiac Monitoring Required?: Yes

## 2021-08-10 NOTE — ED NOTES
Pt reports hx of GI obstructions, Hernias, and Colostomy. Last night pt developed  GI discomfort and watery stool.  Pt now has worsening belly pain     Birdie Wall RN  08/10/21 [id number]

## 2021-08-11 ENCOUNTER — APPOINTMENT (OUTPATIENT)
Dept: GENERAL RADIOLOGY | Age: 61
DRG: 254 | End: 2021-08-11
Payer: COMMERCIAL

## 2021-08-11 LAB
ABSOLUTE EOS #: 0.2 K/UL (ref 0–0.44)
ABSOLUTE IMMATURE GRANULOCYTE: 0.08 K/UL (ref 0–0.3)
ABSOLUTE LYMPH #: 3.23 K/UL (ref 1.1–3.7)
ABSOLUTE MONO #: 0.58 K/UL (ref 0.1–1.2)
ANION GAP SERPL CALCULATED.3IONS-SCNC: 9 MMOL/L (ref 9–17)
BASOPHILS # BLD: 0 % (ref 0–2)
BASOPHILS ABSOLUTE: <0.03 K/UL (ref 0–0.2)
BUN BLDV-MCNC: 7 MG/DL (ref 6–20)
BUN/CREAT BLD: ABNORMAL (ref 9–20)
CALCIUM SERPL-MCNC: 8.3 MG/DL (ref 8.6–10.4)
CHLORIDE BLD-SCNC: 107 MMOL/L (ref 98–107)
CO2: 21 MMOL/L (ref 20–31)
CREAT SERPL-MCNC: 0.43 MG/DL (ref 0.5–0.9)
DIFFERENTIAL TYPE: ABNORMAL
EKG ATRIAL RATE: 114 BPM
EKG P AXIS: 57 DEGREES
EKG P-R INTERVAL: 132 MS
EKG Q-T INTERVAL: 326 MS
EKG QRS DURATION: 66 MS
EKG QTC CALCULATION (BAZETT): 449 MS
EKG R AXIS: 1 DEGREES
EKG T AXIS: 56 DEGREES
EKG VENTRICULAR RATE: 114 BPM
EOSINOPHILS RELATIVE PERCENT: 2 % (ref 1–4)
ESTIMATED AVERAGE GLUCOSE: 255 MG/DL
GFR AFRICAN AMERICAN: >60 ML/MIN
GFR NON-AFRICAN AMERICAN: >60 ML/MIN
GFR SERPL CREATININE-BSD FRML MDRD: ABNORMAL ML/MIN/{1.73_M2}
GFR SERPL CREATININE-BSD FRML MDRD: ABNORMAL ML/MIN/{1.73_M2}
GLUCOSE BLD-MCNC: 135 MG/DL (ref 65–105)
GLUCOSE BLD-MCNC: 147 MG/DL (ref 65–105)
GLUCOSE BLD-MCNC: 147 MG/DL (ref 65–105)
GLUCOSE BLD-MCNC: 154 MG/DL (ref 65–105)
GLUCOSE BLD-MCNC: 161 MG/DL (ref 65–105)
GLUCOSE BLD-MCNC: 168 MG/DL (ref 70–99)
GLUCOSE BLD-MCNC: 172 MG/DL (ref 65–105)
HBA1C MFR BLD: 10.5 % (ref 4–6)
HCT VFR BLD CALC: 40.9 % (ref 36.3–47.1)
HEMOGLOBIN: 12.8 G/DL (ref 11.9–15.1)
IMMATURE GRANULOCYTES: 1 %
LACTIC ACID, WHOLE BLOOD: 0.7 MMOL/L (ref 0.7–2.1)
LYMPHOCYTES # BLD: 25 % (ref 24–43)
MAGNESIUM: 1.6 MG/DL (ref 1.6–2.6)
MCH RBC QN AUTO: 27.8 PG (ref 25.2–33.5)
MCHC RBC AUTO-ENTMCNC: 31.3 G/DL (ref 28.4–34.8)
MCV RBC AUTO: 88.9 FL (ref 82.6–102.9)
MONOCYTES # BLD: 5 % (ref 3–12)
NRBC AUTOMATED: 0 PER 100 WBC
PDW BLD-RTO: 13 % (ref 11.8–14.4)
PLATELET # BLD: 286 K/UL (ref 138–453)
PLATELET ESTIMATE: ABNORMAL
PMV BLD AUTO: 11 FL (ref 8.1–13.5)
POTASSIUM SERPL-SCNC: 3.4 MMOL/L (ref 3.7–5.3)
RBC # BLD: 4.6 M/UL (ref 3.95–5.11)
RBC # BLD: ABNORMAL 10*6/UL
SEG NEUTROPHILS: 68 % (ref 36–65)
SEGMENTED NEUTROPHILS ABSOLUTE COUNT: 8.61 K/UL (ref 1.5–8.1)
SODIUM BLD-SCNC: 137 MMOL/L (ref 135–144)
WBC # BLD: 12.7 K/UL (ref 3.5–11.3)
WBC # BLD: ABNORMAL 10*3/UL

## 2021-08-11 PROCEDURE — 2500000003 HC RX 250 WO HCPCS: Performed by: NURSE PRACTITIONER

## 2021-08-11 PROCEDURE — 6370000000 HC RX 637 (ALT 250 FOR IP): Performed by: NURSE PRACTITIONER

## 2021-08-11 PROCEDURE — 83605 ASSAY OF LACTIC ACID: CPT

## 2021-08-11 PROCEDURE — 82947 ASSAY GLUCOSE BLOOD QUANT: CPT

## 2021-08-11 PROCEDURE — 83036 HEMOGLOBIN GLYCOSYLATED A1C: CPT

## 2021-08-11 PROCEDURE — 6360000002 HC RX W HCPCS: Performed by: NURSE PRACTITIONER

## 2021-08-11 PROCEDURE — 6360000002 HC RX W HCPCS: Performed by: STUDENT IN AN ORGANIZED HEALTH CARE EDUCATION/TRAINING PROGRAM

## 2021-08-11 PROCEDURE — 2060000000 HC ICU INTERMEDIATE R&B

## 2021-08-11 PROCEDURE — 6370000000 HC RX 637 (ALT 250 FOR IP): Performed by: FAMILY MEDICINE

## 2021-08-11 PROCEDURE — 74018 RADEX ABDOMEN 1 VIEW: CPT

## 2021-08-11 PROCEDURE — 2580000003 HC RX 258: Performed by: NURSE PRACTITIONER

## 2021-08-11 PROCEDURE — 99222 1ST HOSP IP/OBS MODERATE 55: CPT | Performed by: FAMILY MEDICINE

## 2021-08-11 PROCEDURE — APPNB60 APP NON BILLABLE TIME 46-60 MINS: Performed by: NURSE PRACTITIONER

## 2021-08-11 PROCEDURE — 80048 BASIC METABOLIC PNL TOTAL CA: CPT

## 2021-08-11 PROCEDURE — 6360000002 HC RX W HCPCS: Performed by: FAMILY MEDICINE

## 2021-08-11 PROCEDURE — 2580000003 HC RX 258: Performed by: STUDENT IN AN ORGANIZED HEALTH CARE EDUCATION/TRAINING PROGRAM

## 2021-08-11 PROCEDURE — 83735 ASSAY OF MAGNESIUM: CPT

## 2021-08-11 PROCEDURE — 85025 COMPLETE CBC W/AUTO DIFF WBC: CPT

## 2021-08-11 PROCEDURE — 36415 COLL VENOUS BLD VENIPUNCTURE: CPT

## 2021-08-11 RX ORDER — FENTANYL CITRATE 50 UG/ML
25 INJECTION, SOLUTION INTRAMUSCULAR; INTRAVENOUS ONCE
Status: COMPLETED | OUTPATIENT
Start: 2021-08-11 | End: 2021-08-11

## 2021-08-11 RX ORDER — POTASSIUM CHLORIDE 7.45 MG/ML
10 INJECTION INTRAVENOUS
Status: DISPENSED | OUTPATIENT
Start: 2021-08-11 | End: 2021-08-11

## 2021-08-11 RX ORDER — ACETAMINOPHEN 325 MG/1
650 TABLET ORAL EVERY 6 HOURS PRN
Status: DISCONTINUED | OUTPATIENT
Start: 2021-08-11 | End: 2021-08-13 | Stop reason: HOSPADM

## 2021-08-11 RX ORDER — INSULIN GLARGINE 100 [IU]/ML
100 INJECTION, SOLUTION SUBCUTANEOUS NIGHTLY
Status: DISCONTINUED | OUTPATIENT
Start: 2021-08-11 | End: 2021-08-11

## 2021-08-11 RX ORDER — MAGNESIUM SULFATE 1 G/100ML
1000 INJECTION INTRAVENOUS PRN
Status: DISCONTINUED | OUTPATIENT
Start: 2021-08-11 | End: 2021-08-13 | Stop reason: HOSPADM

## 2021-08-11 RX ORDER — INSULIN GLARGINE 100 [IU]/ML
50 INJECTION, SOLUTION SUBCUTANEOUS NIGHTLY
Status: DISCONTINUED | OUTPATIENT
Start: 2021-08-11 | End: 2021-08-13 | Stop reason: HOSPADM

## 2021-08-11 RX ORDER — SODIUM CHLORIDE 9 MG/ML
25 INJECTION, SOLUTION INTRAVENOUS PRN
Status: DISCONTINUED | OUTPATIENT
Start: 2021-08-11 | End: 2021-08-13 | Stop reason: HOSPADM

## 2021-08-11 RX ORDER — CARVEDILOL 3.12 MG/1
3.12 TABLET ORAL 2 TIMES DAILY
Status: DISCONTINUED | OUTPATIENT
Start: 2021-08-11 | End: 2021-08-13 | Stop reason: HOSPADM

## 2021-08-11 RX ORDER — MORPHINE SULFATE 2 MG/ML
2 INJECTION, SOLUTION INTRAMUSCULAR; INTRAVENOUS EVERY 4 HOURS PRN
Status: DISCONTINUED | OUTPATIENT
Start: 2021-08-11 | End: 2021-08-13

## 2021-08-11 RX ORDER — DEXTROSE MONOHYDRATE 25 G/50ML
12.5 INJECTION, SOLUTION INTRAVENOUS PRN
Status: DISCONTINUED | OUTPATIENT
Start: 2021-08-11 | End: 2021-08-13 | Stop reason: HOSPADM

## 2021-08-11 RX ORDER — ASPIRIN 81 MG/1
81 TABLET ORAL DAILY
Status: DISCONTINUED | OUTPATIENT
Start: 2021-08-11 | End: 2021-08-13 | Stop reason: HOSPADM

## 2021-08-11 RX ORDER — ACETAMINOPHEN 650 MG/1
650 SUPPOSITORY RECTAL EVERY 6 HOURS PRN
Status: DISCONTINUED | OUTPATIENT
Start: 2021-08-11 | End: 2021-08-13 | Stop reason: HOSPADM

## 2021-08-11 RX ORDER — POTASSIUM CHLORIDE 7.45 MG/ML
10 INJECTION INTRAVENOUS PRN
Status: DISCONTINUED | OUTPATIENT
Start: 2021-08-11 | End: 2021-08-13 | Stop reason: HOSPADM

## 2021-08-11 RX ORDER — POLYETHYLENE GLYCOL 3350 17 G/17G
17 POWDER, FOR SOLUTION ORAL DAILY PRN
Status: DISCONTINUED | OUTPATIENT
Start: 2021-08-11 | End: 2021-08-13 | Stop reason: HOSPADM

## 2021-08-11 RX ORDER — ONDANSETRON 4 MG/1
4 TABLET, ORALLY DISINTEGRATING ORAL EVERY 8 HOURS PRN
Status: DISCONTINUED | OUTPATIENT
Start: 2021-08-11 | End: 2021-08-13 | Stop reason: HOSPADM

## 2021-08-11 RX ORDER — SODIUM CHLORIDE, SODIUM LACTATE, POTASSIUM CHLORIDE, AND CALCIUM CHLORIDE .6; .31; .03; .02 G/100ML; G/100ML; G/100ML; G/100ML
1000 INJECTION, SOLUTION INTRAVENOUS ONCE
Status: COMPLETED | OUTPATIENT
Start: 2021-08-11 | End: 2021-08-11

## 2021-08-11 RX ORDER — POTASSIUM CHLORIDE 20 MEQ/1
40 TABLET, EXTENDED RELEASE ORAL PRN
Status: DISCONTINUED | OUTPATIENT
Start: 2021-08-11 | End: 2021-08-13 | Stop reason: HOSPADM

## 2021-08-11 RX ORDER — MAGNESIUM SULFATE 1 G/100ML
1000 INJECTION INTRAVENOUS
Status: DISPENSED | OUTPATIENT
Start: 2021-08-11 | End: 2021-08-11

## 2021-08-11 RX ORDER — NICOTINE POLACRILEX 4 MG
15 LOZENGE BUCCAL PRN
Status: DISCONTINUED | OUTPATIENT
Start: 2021-08-11 | End: 2021-08-13 | Stop reason: HOSPADM

## 2021-08-11 RX ORDER — SODIUM CHLORIDE 0.9 % (FLUSH) 0.9 %
10 SYRINGE (ML) INJECTION PRN
Status: DISCONTINUED | OUTPATIENT
Start: 2021-08-11 | End: 2021-08-13 | Stop reason: HOSPADM

## 2021-08-11 RX ORDER — ATORVASTATIN CALCIUM 80 MG/1
80 TABLET, FILM COATED ORAL DAILY
Status: DISCONTINUED | OUTPATIENT
Start: 2021-08-11 | End: 2021-08-13 | Stop reason: HOSPADM

## 2021-08-11 RX ORDER — SODIUM CHLORIDE 0.9 % (FLUSH) 0.9 %
5-40 SYRINGE (ML) INJECTION EVERY 12 HOURS SCHEDULED
Status: DISCONTINUED | OUTPATIENT
Start: 2021-08-11 | End: 2021-08-13 | Stop reason: HOSPADM

## 2021-08-11 RX ORDER — SODIUM CHLORIDE 9 MG/ML
INJECTION, SOLUTION INTRAVENOUS CONTINUOUS
Status: DISCONTINUED | OUTPATIENT
Start: 2021-08-11 | End: 2021-08-13

## 2021-08-11 RX ORDER — MAGNESIUM SULFATE 1 G/100ML
1000 INJECTION INTRAVENOUS
Status: COMPLETED | OUTPATIENT
Start: 2021-08-11 | End: 2021-08-11

## 2021-08-11 RX ORDER — DEXTROSE MONOHYDRATE 50 MG/ML
100 INJECTION, SOLUTION INTRAVENOUS PRN
Status: DISCONTINUED | OUTPATIENT
Start: 2021-08-11 | End: 2021-08-13 | Stop reason: HOSPADM

## 2021-08-11 RX ORDER — ONDANSETRON 2 MG/ML
4 INJECTION INTRAMUSCULAR; INTRAVENOUS EVERY 6 HOURS PRN
Status: DISCONTINUED | OUTPATIENT
Start: 2021-08-11 | End: 2021-08-13 | Stop reason: HOSPADM

## 2021-08-11 RX ADMIN — MAGNESIUM SULFATE HEPTAHYDRATE 1000 MG: 1 INJECTION, SOLUTION INTRAVENOUS at 22:54

## 2021-08-11 RX ADMIN — ATORVASTATIN CALCIUM 80 MG: 80 TABLET, FILM COATED ORAL at 14:09

## 2021-08-11 RX ADMIN — FAMOTIDINE 20 MG: 10 INJECTION INTRAVENOUS at 14:18

## 2021-08-11 RX ADMIN — INSULIN LISPRO 2 UNITS: 100 INJECTION, SOLUTION INTRAVENOUS; SUBCUTANEOUS at 23:56

## 2021-08-11 RX ADMIN — MAGNESIUM SULFATE HEPTAHYDRATE 1000 MG: 1 INJECTION, SOLUTION INTRAVENOUS at 20:57

## 2021-08-11 RX ADMIN — CARVEDILOL 3.12 MG: 3.12 TABLET, FILM COATED ORAL at 14:09

## 2021-08-11 RX ADMIN — POTASSIUM CHLORIDE 10 MEQ: 7.46 INJECTION, SOLUTION INTRAVENOUS at 18:32

## 2021-08-11 RX ADMIN — POTASSIUM CHLORIDE 10 MEQ: 7.46 INJECTION, SOLUTION INTRAVENOUS at 20:51

## 2021-08-11 RX ADMIN — POTASSIUM CHLORIDE 10 MEQ: 7.46 INJECTION, SOLUTION INTRAVENOUS at 22:54

## 2021-08-11 RX ADMIN — CARVEDILOL 3.12 MG: 3.12 TABLET, FILM COATED ORAL at 20:38

## 2021-08-11 RX ADMIN — SODIUM CHLORIDE: 9 INJECTION, SOLUTION INTRAVENOUS at 05:16

## 2021-08-11 RX ADMIN — MORPHINE SULFATE 2 MG: 2 INJECTION, SOLUTION INTRAMUSCULAR; INTRAVENOUS at 14:30

## 2021-08-11 RX ADMIN — SODIUM CHLORIDE, PRESERVATIVE FREE 10 ML: 5 INJECTION INTRAVENOUS at 21:05

## 2021-08-11 RX ADMIN — FENTANYL CITRATE 25 MCG: 50 INJECTION, SOLUTION INTRAMUSCULAR; INTRAVENOUS at 05:07

## 2021-08-11 RX ADMIN — Medication 81 MG: at 14:09

## 2021-08-11 RX ADMIN — MAGNESIUM SULFATE HEPTAHYDRATE 1000 MG: 1 INJECTION, SOLUTION INTRAVENOUS at 18:33

## 2021-08-11 RX ADMIN — INSULIN LISPRO 2 UNITS: 100 INJECTION, SOLUTION INTRAVENOUS; SUBCUTANEOUS at 12:16

## 2021-08-11 RX ADMIN — MORPHINE SULFATE 2 MG: 2 INJECTION, SOLUTION INTRAMUSCULAR; INTRAVENOUS at 20:35

## 2021-08-11 RX ADMIN — SODIUM CHLORIDE, POTASSIUM CHLORIDE, SODIUM LACTATE AND CALCIUM CHLORIDE 1000 ML: 600; 310; 30; 20 INJECTION, SOLUTION INTRAVENOUS at 14:24

## 2021-08-11 RX ADMIN — INSULIN GLARGINE 50 UNITS: 100 INJECTION, SOLUTION SUBCUTANEOUS at 20:48

## 2021-08-11 RX ADMIN — FAMOTIDINE 20 MG: 10 INJECTION INTRAVENOUS at 20:40

## 2021-08-11 RX ADMIN — FAMOTIDINE 20 MG: 10 INJECTION INTRAVENOUS at 14:08

## 2021-08-11 ASSESSMENT — ENCOUNTER SYMPTOMS
ABDOMINAL PAIN: 1
SHORTNESS OF BREATH: 0
WHEEZING: 0
VOMITING: 0
VOICE CHANGE: 0
DIARRHEA: 0
NAUSEA: 1
BLOOD IN STOOL: 0
CONSTIPATION: 0
COUGH: 0
SORE THROAT: 0
SINUS PRESSURE: 0

## 2021-08-11 ASSESSMENT — PAIN SCALES - GENERAL
PAINLEVEL_OUTOF10: 8

## 2021-08-11 NOTE — CONSULTS
General Surgery   Consultation        PATIENT NAME: Sai Henson   YOB: 1960  MRN: [id number]    ADMISSION DATE: 8/10/2021  6:17 PM     Consulting Physician: Dr. Noe Acosta Physician: Pedro Cameron DATE: 8/10/2021    Consult regarding: Partial SBO      Cc: Abdominal pain, nuasea    HPI:  The patient is a 62 y.o. female  who presents with acute abdominal pain, nausea, and vomiting. She has had numerous abdominal surgeries including multiple hernia repairs, sigmoidectomy for colonic stricture complicated by dehiscence. Her last surgical procedure was in 2017. She has 1 day history of abdominal pain and nausea with 2 episodes of emesis. No blood in stool or emesis. She continues to have high output from her colostomy of liquid stool. She is very wary of the possibility of requiring any further surgical intervention. Past Medical History:        Diagnosis Date    Allergic rhinitis     Arthritis     LT. KNEE    Chronic back pain     Diverticulitis     GERD (gastroesophageal reflux disease)     Headache(784.0)     Hyperlipidemia     Hypertension     Knee pain     left knee pain    MDRO (multiple drug resistant organisms) resistance 2/24/2014    E. Coli urine    Narcotic abuse ([facility] [address] 7/28/2015    Obesity     Osteoarthritis     Rectovaginal fistula     Type II or unspecified type diabetes mellitus without mention of complication, not stated as uncontrolled     Urinary incontinence        Past Surgical History:        Procedure Laterality Date    ABDOMEN SURGERY  5/1/16    laprascopic ventral hernia repair with TRINH    ABDOMEN SURGERY  01/21/2014    Takedown of colovaginal fistula    ABDOMINAL EXPLORATION SURGERY  01/02/2017    mesh replacement, wound washout with wound vac application    KNEE ARTHROPLASTY      bilateral knees    VENTRAL HERNIA REPAIR  [id number]       Medications Prior to Admission:   Not in a hospital admission.     Allergies:  Lisinopril, Ace inhibitors, Ciprofloxacin, Metaxalone, Other, and Penicillins    Social History:   Social History     Socioeconomic History    Marital status: Single     Spouse name: Not on file    Number of children: Not on file    Years of education: Not on file    Highest education level: Not on file   Occupational History    Not on file   Tobacco Use    Smoking status: Current Every Day Smoker     Packs/day: 1.00     Years: 30.00     Pack years: 30.00     Types: Cigarettes     Start date: 10/24/1989    Smokeless tobacco: Never Used    Tobacco comment: 1 cigarette/day   Substance and Sexual Activity    Alcohol use: No     Alcohol/week: 0.0 standard drinks    Drug use: No    Sexual activity: Never   Other Topics Concern    Not on file   Social History Narrative    Not on file     Social Determinants of Health     Financial Resource Strain:     Difficulty of Paying Living Expenses:    Food Insecurity:     Worried About Running Out of Food in the Last Year:     Ran Out of Food in the Last Year:    Transportation Needs:     Lack of Transportation (Medical):  Lack of Transportation (Non-Medical):    Physical Activity:     Days of Exercise per Week:     Minutes of Exercise per Session:    Stress:     Feeling of Stress :    Social Connections:     Frequency of Communication with Friends and Family:     Frequency of Social Gatherings with Friends and Family:     Attends Restorationist Services:     Active Member of Clubs or Organizations:     Attends Club or Organization Meetings:     Marital Status:    Intimate Partner Violence:     Fear of Current or Ex-Partner:     Emotionally Abused:     Physically Abused:     Sexually Abused:        Family History:       Problem Relation Age of Onset    Diabetes Mother     Cancer Father         COLON, PROSTATE    Heart Disease Paternal Aunt        Review of Systems:    Constitutional: Positive for chills. Negative for fever. HENT: Negative for congestion and sore throat. Respiratory: Negative for cough and shortness of breath. Cardiovascular: Negative for chest pain. Gastrointestinal: Positive for abdominal pain, diarrhea, nausea and vomiting. Negative for blood in stool. Genitourinary: Negative for dysuria and frequency. Musculoskeletal: Negative for neck stiffness. Skin: Negative for rash. Neurological: Negative for weakness and headaches.        PHYSICAL EXAM:    VITALS:  /76   Pulse 118   Temp 96.8 °F (36 °C) (Tympanic)   Resp 16   Ht 5' 3\" (1.6 m)   Wt 194 lb 11.2 oz (88.3 kg)   SpO2 98%   BMI 34.49 kg/m²   INTAKE/OUTPUT:   No intake or output data in the 24 hours ending 08/10/21 2213    CONSTITUTIONAL: awake, alert, cooperative, no apparent distress  HEAD: atraumatic, normocephalic  EYES: sclera clear, pupils equal and reactive to light  ENT: ears are symmetric, nares patent, moist mucous membranes  NECK: Supple, symmetrical, trachea midline  LUNGS: no respiratory distress, no audible wheezing  CARDIOVASCULAR: +S1/S2  ABDOMEN: soft, tender to palpation worst in suprapubic region, nondistended, non-peritoneal on exam, multiple well healed surgical incisions  MUSCULOSKELETAL: full range of motion noted  NEUROLOGIC: Awake, alert, oriented to name, place and time  EXTREMITIES: no cyanosis or clubbing  SKIN: normal coloration and turgor      CBC with Differential:    Lab Results   Component Value Date    WBC 25.0 08/10/2021    RBC 5.99 08/10/2021    HGB 16.8 08/10/2021    HCT 51.9 08/10/2021     08/10/2021    MCV 86.6 08/10/2021    MCH 28.0 08/10/2021    MCHC 32.4 08/10/2021    RDW 12.9 08/10/2021    METASPCT 2 12/26/2016    LYMPHOPCT 15 08/10/2021    MONOPCT 2 08/10/2021    BASOPCT 0 08/10/2021    MONOSABS 0.50 08/10/2021    LYMPHSABS 3.75 08/10/2021    EOSABS 0.25 08/10/2021    BASOSABS 0.00 08/10/2021    DIFFTYPE NOT REPORTED 08/10/2021     BMP:    Lab Results   Component Value Date     08/10/2021    K 4.2 08/10/2021    CL 96 08/10/2021 CO2 24 08/10/2021    BUN 10 08/10/2021    LABALBU 4.0 08/10/2021    CREATININE 0.69 08/10/2021    CALCIUM 9.6 08/10/2021    GFRAA >60 08/10/2021    LABGLOM >60 08/10/2021    GLUCOSE 292 08/10/2021       Pertinent Radiology: CT ABDOMEN PELVIS W IV CONTRAST Additional Contrast? None    Result Date: 8/10/2021  Status post left hemicolectomy with Donna's pouch noted and left lower quadrant colostomy with associated parastomal hernia. Fluid-filled dilated loops of small bowel seen within the hernia sac as well as mild amount of surrounding fluid most likely reflecting a partial small bowel obstruction. Cholelithiasis Mildly fatty liver RECOMMENDATIONS: Surgical consultation     XR ABDOMEN FOR NG/OG/NE TUBE PLACEMENT    Result Date: 8/11/2021  Appropriate radiographic positioning of nasogastric tube. Recommend clinical correlation prior to use. ASSESSMENT   Active Problems:    * No active hospital problems. *  Resolved Problems:    * No resolved hospital problems. *      PLAN    1. NPO  2. IVF  3. NGT  4. SERIAL ABD EXAM  5. CBC AND LAC IN AM   6.  Will trial conservative management    Discussed with Dr. Dewayne Jaeger DO PGY 3  General Surgery Resident  08/11/21 5:57 AM

## 2021-08-11 NOTE — CARE COORDINATION
Case Management Initial Discharge Plan  Gabby Melvin,             Met with:patient to discuss discharge plans. Information verified: address, contacts, phone number, , insurance Yes  Insurance Provider: [facility]    Emergency Contact/Next of Kin name & number: mother [facility] [phone]  Who are involved in patient's support system? Boyfriend and mother    PCP: Belkis Rod  Date of last visit: 2021      Discharge Planning    Living Arrangements:  Spouse/Significant Other     Home has 2 stories   10stairs to climb to reach second floor      Patient able to perform ADL's:Independent    Current Services (outpatient & in home) none  DME equipment: none  DME provider: n/a    Is patient receiving oral anticoagulation therapy? No        Potential Assistance Needed:  N/A    Patient agreeable to home care: No  [place] of choice provided:  n/a    Prior SNF/Rehab Placement and Facility: N/A  Agreeable to SNF/Rehab: No  [place] of choice provided: n/a     Evaluation: n/a    Expected Discharge date:  21    Patient expects to be discharged to:   home    If home: is the family and/or caregiver willing & able to provide support at home? yes  Who will be providing this support? boyfriend*    Follow Up Appointment: Best Day/ Time:      Transportation provider: em or kids  Transportation arrangements needed for discharge: No    Readmission Risk              Risk of Unplanned Readmission:  22             Does patient have a readmission risk score greater than 14?: Yes  If yes, follow-up appointment must be made within 7 days of discharge.      Goals of Care: no abd pain      Educated patient on transitional options, provided freedom of choice and are agreeable with plan      Discharge Plan: home with boyfriend [facility]          Electronically signed by Jordan Hsieh RN on 21 at 11:55 AM EDT

## 2021-08-11 NOTE — H&P
[facility]  Office: [address], DO, Fritz Luther, DO, Humberto Rubin, DO, Jax Bales Blood, DO, Sanju Gonzales MD, Luciana Naranjo MD, Courtney Ramirez MD, Bhargav Madrid MD, Junior Collado MD, Nigel Duarte MD, Dejuan Ng MD, Kiet Lucio, DO, Arnaldo Gupta MD, Araceli Samuels DO, Tian Trevino MD,  Ida Npaier DO, Annmarie Powers MD, Isidro Garcia MD, Delicia Rodgers MD, Cheo Morse MD, Raffy Swann MD, Belem Harrell MD, Kailyn Levin MD, Issa David, [facility], [facility], [facility], Johnnie Lundyippo, CNP, Nathan Zaragoza, CNS, Young Tobin, CNP, Ruby Meadows, CNP, Rosaline Larose, CNP, Ciera Carreno, CNP, Peterson Dumont, CNP, Deny Montiel PA-C, Yaakov Castillo, [facility], Adry Champagne, CNP, Lieutenant Cheatham, CNP, Stefanie Vega, CNP, Tamara Lizarraga, CNP, Pato Kent, CNP, Sean De La Torre, CNP, Francisco Javier Cruz, [facility], [address]      HISTORY AND PHYSICAL EXAMINATION            Date:   8/11/2021  Patient name:  Radha Loaiza  Date of admission:  8/10/2021  6:17 PM  MRN:   [id number]  Account:  [de-identified]  YOB: 1960  PCP:    No primary care provider on file. Room:   2009/2009-01  Code Status:    Full Code    Chief Complaint:     Chief Complaint   Patient presents with    Abdominal Pain    Nausea    Emesis       History Obtained From:     patient, electronic medical record    History of Present Illness: The patient is a 62 y.o. Non- / non  female who presents with Abdominal Pain, Nausea, and Emesis   and she is admitted to the hospital for the management of  SBO (small bowel obstruction) ([facility] [address] Patient came to emergency room with acute onset abdominal pain associated with nausea, vomiting. Patient had pain for 24 hours prior to presentation. She reported multiple episodes of vomiting and loose stools in the colostomy bag. She reports history of small bowel obstruction before due to colonic stricture.   She had multiple surgeries. Patient denies any fever, blood in stool. Other comorbidities include GERD, hypertension, hyperlipidemia, rectovaginal fistula, type 2 diabetes mellitus. Evaluation emergency room showed normal kidney function, leukocytosis 25.0, EKG showed sinus tachycardia. CT abdomen showed left lower quadrant colostomy with parastomal hernia dilated with small bowel loops in hernia sac. Past Medical History:     Past Medical History:   Diagnosis Date    Allergic rhinitis     Arthritis     LT. KNEE    Chronic back pain     Diverticulitis     GERD (gastroesophageal reflux disease)     Headache(784.0)     Hyperlipidemia     Hypertension     Knee pain     left knee pain    MDRO (multiple drug resistant organisms) resistance 2/24/2014    E. Coli urine    Narcotic abuse ([facility] [address] 7/28/2015    Obesity     Osteoarthritis     Rectovaginal fistula     Type II or unspecified type diabetes mellitus without mention of complication, not stated as uncontrolled     Urinary incontinence         Past Surgical History:     Past Surgical History:   Procedure Laterality Date    ABDOMEN SURGERY  5/1/16    laprascopic ventral hernia repair with TRINH    ABDOMEN SURGERY  01/21/2014    Takedown of colovaginal fistula    ABDOMINAL EXPLORATION SURGERY  01/02/2017    mesh replacement, wound washout with wound vac application    KNEE ARTHROPLASTY      bilateral knees    VENTRAL HERNIA REPAIR  [id number]        Medications Prior to Admission:     Prior to Admission medications    Medication Sig Start Date End Date Taking?  Authorizing Provider   famotidine (PEPCID) 20 MG tablet TAKE ONE TABLET BY MOUTH TWICE DAILY 5/1/20   Darin Hurtado MD   clotrimazole (LOTRIMIN) 1 % cream APPLY TO AFFECTED AREA TWICE DAILY 5/1/20   Darin Hurtado MD   diphenhydrAMINE (GNP ALLERGY) 25 MG tablet Take 1 tablet by mouth every 8 hours as needed for Itching 4/3/20   Darin Hurtado MD   glipiZIDE (GLUCOTROL) 5 MG tablet TAKE ONE TABLET BY MOUTH TWICE DAILY 4/3/20   Jeaneth Garay MD   carvedilol (COREG) 3.125 MG tablet TAKE ONE TABLET BY MOUTH TWICE DAILY 4/3/20   Jeaneth Garay MD   LANTUS SOLOSTAR 100 UNIT/ML injection pen INJECT 100 UNITS UNDER THE SKIN NIGHTLY 4/3/20   Jeaneth Garay MD   EASY TOUCH PEN NEEDLES 32G X 6 MM MISC USE AS DIRECTED 4/3/20   Jeaneth Garay MD   atorvastatin (LIPITOR) 80 MG tablet TAKE ONE TABLET BY MOUTH DAILY 4/3/20   Jeaneth Garay MD   raNITIdine (ZANTAC) 150 MG tablet Take 1 tablet by mouth 2 times daily 3/13/20   Jeaneth Garay MD   acetaminophen (TYLENOL) 325 MG tablet TAKE ONE TABLET BY MOUTH EVERY SIX HOURS AS NEEDED FOR PAIN 3/11/20 4/10/20  Jeaneth Garay MD   ibuprofen (ADVIL;MOTRIN) 400 MG tablet TAKE ONE TABLET BY MOUTH EVERY SIX HOURS AS NEEDED FOR PAIN 3/11/20 4/10/20  Jeaneth Garay MD   dicyclomine (BENTYL) 20 MG tablet TAKE ONE TABLET BY MOUTH THREE TIMES DAILY AS NEEDED FOR ABDOMINAL PAIN 3/11/20 4/10/20  Jeaneth Garay MD   triamcinolone (KENALOG) 0.025 % cream APPLY TO AFFECTED AREA TWICE DAILY 3/11/20   Jeaneth Garay MD   insulin lispro, 1 Unit Dial, 100 UNIT/ML SOPN INJECT 25 UNITS SUBCUTANEOUSLY THREE TIMES DAILY BEFORE MEALS 3/11/20 4/10/20  Jeaneth Garay MD   ULTICARE ALCOHOL SWABS 70 % PADS USE AS DIRECTED 1/15/20   Jeaneth Garay MD   nystatin (MYCOSTATIN) 431430 UNIT/GM cream Apply topically 2 times daily.  10/24/19   Jeaneth Garay MD   aspirin (ASPIRIN LOW DOSE) 81 MG EC tablet TAKE ONE TABLET BY MOUTH DAILY 10/24/19   Jeaneth Garay MD   Incontinence Supplies MISC Use as needed for incontinence 10/24/19   Jeaneth Garay MD   ipratropium-albuterol (DUONEB) 0.5-2.5 (3) MG/3ML SOLN nebulizer solution Inhale 3 mLs into the lungs 4 times daily as needed for Shortness of Breath 10/24/19   Jeaneth Garay MD   nicotine (NICODERM CQ) 21 MG/24HR Place 1 patch onto the skin every 24 hours 10/24/19 10/23/20  Jeaneth Garay MD   TRUEPLUS LANCETS 33G MISC USE AS DIRECTED THREE TIMES DAILY 10/24/19   Jeaneth Garay MD   blood glucose test strips (TRUE METRIX BLOOD GLUCOSE TEST) strip TEST BLOOD SUGAR DAILY AS DIRECTED 10/24/19   Angelique Flynn MD   SURE COMFORT PEN NEEDLES 32G X 4 MM MISC USE AS DIRECTED 7/11/19   Angelique Flynn MD   metFORMIN (GLUCOPHAGE) 1000 MG tablet TAKE ONE TABLET BY MOUTH TWICE DAILY WITH MEALS 5/1/18   Dilip Asif MD        Allergies:     Lisinopril, Ace inhibitors, Ciprofloxacin, Metaxalone, Other, and Penicillins    Social History:     Tobacco:    reports that she has been smoking cigarettes. She started smoking about 31 years ago. She has a 30.00 pack-year smoking history. She has never used smokeless tobacco.  Alcohol:      reports no history of alcohol use. Drug Use:  reports no history of drug use. Family History:     Family History   Problem Relation Age of Onset    Diabetes Mother     Cancer Father         COLON, PROSTATE    Heart Disease Paternal Aunt        Review of Systems:     Positive and Negative as described in HPI. Review of Systems   Constitutional: Negative for activity change, appetite change, chills, fatigue, fever and unexpected weight change. HENT: Negative for congestion, mouth sores, postnasal drip, sinus pressure, sore throat and voice change. Eyes: Negative for visual disturbance. Respiratory: Negative for cough, shortness of breath and wheezing. Cardiovascular: Negative for chest pain and palpitations. Gastrointestinal: Positive for abdominal pain and nausea. Negative for blood in stool, constipation, diarrhea and vomiting. Endocrine: Negative for polyuria. Genitourinary: Negative for difficulty urinating, dysuria, frequency and urgency. Musculoskeletal: Negative for arthralgias, joint swelling and myalgias. Neurological: Negative for dizziness, tremors, speech difficulty, light-headedness and headaches.        Physical Exam:   BP 96/75   Pulse 87   Temp 96.8 °F (36 °C) (Tympanic)   Resp 18   Ht 5' 3\" (1.6 m)   Wt 194 lb 11.2 oz (88.3 kg)   SpO2 97%   BMI 34.49 kg/m²   Temp (24hrs), Psychiatric:         Behavior: Behavior is cooperative.          Investigations:      Laboratory Testing:  Recent Results (from the past 24 hour(s))   Basic Metabolic Panel w/ Reflex to MG    Collection Time: 08/10/21  6:41 PM   Result Value Ref Range    Glucose 292 (H) 70 - 99 mg/dL    BUN 10 6 - 20 mg/dL    CREATININE 0.69 0.50 - 0.90 mg/dL    Bun/Cre Ratio NOT REPORTED 9 - 20    Calcium 9.6 8.6 - 10.4 mg/dL    Sodium 133 (L) 135 - 144 mmol/L    Potassium 4.2 3.7 - 5.3 mmol/L    Chloride 96 (L) 98 - 107 mmol/L    CO2 24 20 - 31 mmol/L    Anion Gap 13 9 - 17 mmol/L    GFR Non-African American >60 >60 mL/min    GFR African American >60 >60 mL/min    GFR Comment          GFR Staging NOT REPORTED    CBC Auto Differential    Collection Time: 08/10/21  6:41 PM   Result Value Ref Range    WBC 25.0 (H) 3.5 - 11.3 k/uL    RBC 5.99 (H) 3.95 - 5.11 m/uL    Hemoglobin 16.8 (H) 11.9 - 15.1 g/dL    Hematocrit 51.9 (H) 36.3 - 47.1 %    MCV 86.6 82.6 - 102.9 fL    MCH 28.0 25.2 - 33.5 pg    MCHC 32.4 28.4 - 34.8 g/dL    RDW 12.9 11.8 - 14.4 %    Platelets 889 926 - 372 k/uL    MPV 10.6 8.1 - 13.5 fL    NRBC Automated 0.0 0.0 per 100 WBC    Differential Type NOT REPORTED     WBC Morphology NOT REPORTED     RBC Morphology NOT REPORTED     Platelet Estimate NOT REPORTED     Immature Granulocytes 0 0 %    Seg Neutrophils 82 (H) 36 - 66 %    Lymphocytes 15 (L) 24 - 44 %    Monocytes 2 1 - 7 %    Eosinophils % 1 1 - 4 %    Basophils 0 0 - 2 %    Absolute Immature Granulocyte 0.00 0.00 - 0.30 k/uL    Segs Absolute 20.50 (H) 1.8 - 7.7 k/uL    Absolute Lymph # 3.75 1.0 - 4.8 k/uL    Absolute Mono # 0.50 0.1 - 0.8 k/uL    Absolute Eos # 0.25 0.0 - 0.4 k/uL    Basophils Absolute 0.00 0.0 - 0.2 k/uL    Morphology Normal    Hepatic Function Panel    Collection Time: 08/10/21  6:41 PM   Result Value Ref Range    Albumin 4.0 3.5 - 5.2 g/dL    Alkaline Phosphatase 207 (H) 35 - 104 U/L    ALT 13 5 - 33 U/L    AST 17 <32 U/L    Total Bilirubin 0. 35 0.3 - 1.2 mg/dL    Bilirubin, Direct 0.08 <0.31 mg/dL    Bilirubin, Indirect 0.27 0.00 - 1.00 mg/dL    Total Protein 8.7 (H) 6.4 - 8.3 g/dL    Globulin NOT REPORTED 1.5 - 3.8 g/dL    Albumin/Globulin Ratio 0.9 (L) 1.0 - 2.5   Lipase    Collection Time: 08/10/21  6:41 PM   Result Value Ref Range    Lipase 9 (L) 13 - 60 U/L   EKG 12 Lead    Collection Time: 08/10/21  6:43 PM   Result Value Ref Range    Ventricular Rate 114 BPM    Atrial Rate 114 BPM    P-R Interval 132 ms    QRS Duration 66 ms    Q-T Interval 326 ms    QTc Calculation (Bazett) 449 ms    P Axis 57 degrees    R Axis 1 degrees    T Axis 56 degrees   Lactic Acid, Whole Blood    Collection Time: 08/10/21  7:29 PM   Result Value Ref Range    Lactic Acid, Whole Blood 1.2 0.7 - 2.1 mmol/L   Culture, Blood 1    Collection Time: 08/10/21  8:42 PM    Specimen: Blood   Result Value Ref Range    Specimen Description . BLOOD     Special Requests L FOREARM3 ML     Culture NO GROWTH 10 HOURS    Culture, Blood 1    Collection Time: 08/10/21  8:45 PM    Specimen: Blood   Result Value Ref Range    Specimen Description . BLOOD     Special Requests L AC 10 ML     Culture NO GROWTH 10 HOURS    Lactic Acid, Whole Blood    Collection Time: 08/11/21 12:53 AM   Result Value Ref Range    Lactic Acid, Whole Blood 0.7 0.7 - 2.1 mmol/L   Hemoglobin A1c    Collection Time: 08/11/21  4:32 AM   Result Value Ref Range    Hemoglobin A1C 10.5 (H) 4.0 - 6.0 %    Estimated Avg Glucose 255 mg/dL   POC Glucose Fingerstick    Collection Time: 08/11/21  5:09 AM   Result Value Ref Range    POC Glucose 172 (H) 65 - 105 mg/dL   POC Glucose Fingerstick    Collection Time: 08/11/21  6:48 AM   Result Value Ref Range    POC Glucose 147 (H) 65 - 105 mg/dL   CBC WITH AUTO DIFFERENTIAL    Collection Time: 08/11/21  7:30 AM   Result Value Ref Range    WBC 12.7 (H) 3.5 - 11.3 k/uL    RBC 4.60 3.95 - 5.11 m/uL    Hemoglobin 12.8 11.9 - 15.1 g/dL    Hematocrit 40.9 36.3 - 47.1 %    MCV 88.9 82.6 - 102.9 fL    MCH 27.8 25.2 - 33.5 pg    MCHC 31.3 28.4 - 34.8 g/dL    RDW 13.0 11.8 - 14.4 %    Platelets 548 187 - 188 k/uL    MPV 11.0 8.1 - 13.5 fL    NRBC Automated 0.0 0.0 per 100 WBC    Differential Type NOT REPORTED     WBC Morphology NOT REPORTED     RBC Morphology NOT REPORTED     Platelet Estimate NOT REPORTED     Seg Neutrophils 68 (H) 36 - 65 %    Lymphocytes 25 24 - 43 %    Monocytes 5 3 - 12 %    Eosinophils % 2 1 - 4 %    Basophils 0 0 - 2 %    Immature Granulocytes 1 (H) 0 %    Segs Absolute 8.61 (H) 1.50 - 8.10 k/uL    Absolute Lymph # 3.23 1.10 - 3.70 k/uL    Absolute Mono # 0.58 0.10 - 1.20 k/uL    Absolute Eos # 0.20 0.00 - 0.44 k/uL    Basophils Absolute <0.03 0.00 - 0.20 k/uL    Absolute Immature Granulocyte 0.08 0.00 - 0.30 k/uL   Basic Metabolic Panel w/ Reflex to MG    Collection Time: 08/11/21  7:30 AM   Result Value Ref Range    Glucose 168 (H) 70 - 99 mg/dL    BUN 7 6 - 20 mg/dL    CREATININE 0.43 (L) 0.50 - 0.90 mg/dL    Bun/Cre Ratio NOT REPORTED 9 - 20    Calcium 8.3 (L) 8.6 - 10.4 mg/dL    Sodium 137 135 - 144 mmol/L    Potassium 3.4 (L) 3.7 - 5.3 mmol/L    Chloride 107 98 - 107 mmol/L    CO2 21 20 - 31 mmol/L    Anion Gap 9 9 - 17 mmol/L    GFR Non-African American >60 >60 mL/min    GFR African American >60 >60 mL/min    GFR Comment          GFR Staging NOT REPORTED    Magnesium    Collection Time: 08/11/21  7:30 AM   Result Value Ref Range    Magnesium 1.6 1.6 - 2.6 mg/dL       Imaging/Diagonstics:    CT ABDOMEN PELVIS W IV CONTRAST Additional Contrast? None    Result Date: 8/10/2021  Status post left hemicolectomy with Donna's pouch noted and left lower quadrant colostomy with associated parastomal hernia. Fluid-filled dilated loops of small bowel seen within the hernia sac as well as mild amount of surrounding fluid most likely reflecting a partial small bowel obstruction.  Cholelithiasis Mildly fatty liver RECOMMENDATIONS: Surgical consultation     XR ABDOMEN FOR NG/OG/NE TUBE PLACEMENT    Result Date: 8/11/2021  Appropriate radiographic positioning of nasogastric tube. Recommend clinical correlation prior to use. Assessment :      Primary Problem  SBO (small bowel obstruction) ([facility] [address]    Active Hospital Problems    Diagnosis Date Noted    MIKY (obstructive sleep apnea) [G47.33]     Uncontrolled type 2 diabetes mellitus with hyperglycemia, with long-term current use of insulin (HCC) [E11.65, Z79.4]     SBO (small bowel obstruction) ([facility] [address] [K56.609]     Essential hypertension [I10] 02/19/2015       Plan:     Patient status Admit as inpatient in the  Med/Surge    1. Parastomal hernia with  small bowel obstruction -bowel rest, nasogastric tube to suction. General surgery following. Pain control, IV fluids. 2. Type 2 diabetes mellitus -on Lantus at home 100 units nightly. Continue at 50 units nightly for now while patient is n.p.o. Humalog as needed. Hold Metformin. 3. H/o Colonic stricture s/p  colostomy  4. Essential hypertension-uncontrolled, hydralazine IV as needed. Consultations:   IP CONSULT TO GENERAL SURGERY  IP CONSULT TO INTERNAL MEDICINE  IP CONSULT TO HOSPITALIST    Patient is admitted as inpatient status because of co-morbidities listed above, severity of signs and symptoms as outlined, requirement for current medical therapies and most importantly because of direct risk to patient if care not provided in a hospital setting. Eric Torres MD  8/11/2021    Copy sent to Dr. Arroyo primary care provider on file.     (Please note that portions of this note were completed with a voice recognition program. Efforts were made to edit the dictations but occasionally words are mis-transcribed.)

## 2021-08-11 NOTE — PROGRESS NOTES
as needed for incontinence    INSULIN LISPRO, 1 UNIT DIAL, 100 UNIT/ML SOPN    INJECT 25 UNITS SUBCUTANEOUSLY THREE TIMES DAILY BEFORE MEALS    IPRATROPIUM-ALBUTEROL (DUONEB) 0.5-2.5 (3) MG/3ML SOLN NEBULIZER SOLUTION    Inhale 3 mLs into the lungs 4 times daily as needed for Shortness of Breath    LANTUS SOLOSTAR 100 UNIT/ML INJECTION PEN    INJECT 100 UNITS UNDER THE SKIN NIGHTLY    METFORMIN (GLUCOPHAGE) 1000 MG TABLET    TAKE ONE TABLET BY MOUTH TWICE DAILY WITH MEALS    NICOTINE (NICODERM CQ) 21 MG/24HR    Place 1 patch onto the skin every 24 hours    NYSTATIN (MYCOSTATIN) 409146 UNIT/GM CREAM    Apply topically 2 times daily. RANITIDINE (ZANTAC) 150 MG TABLET    Take 1 tablet by mouth 2 times daily    SURE COMFORT PEN NEEDLES 32G X 4 MM MISC    USE AS DIRECTED    TRIAMCINOLONE (KENALOG) 0.025 % CREAM    APPLY TO AFFECTED AREA TWICE DAILY    TRUEPLUS LANCETS 33G MISC    USE AS DIRECTED THREE TIMES DAILY    ULTICARE ALCOHOL SWABS 70 % PADS    USE AS DIRECTED       Encounter Medications  Orders Placed This Encounter   Medications    0.9 % sodium chloride bolus    morphine injection 4 mg    0.9 % sodium chloride bolus    cefTRIAXone (ROCEPHIN) 1000 mg IVPB in 50 mL D5W minibag     Order Specific Question:   Antimicrobial Indications     Answer:   Infectious Diarrhea    metronidazole (FLAGYL) 500 mg in NaCl 100 mL IVPB premix     Order Specific Question:   Antimicrobial Indications     Answer:   Intra-Abdominal Infection    iopamidol (ISOVUE-370) 76 % injection 75 mL    morphine injection 4 mg       ALLERGIES     is allergic to lisinopril, ace inhibitors, ciprofloxacin, metaxalone, other, and penicillins. RECENT VITALS:   Temp: 96.8 °F (36 °C),  Pulse: 118, Resp: 16, BP: 102/76    RADIOLOGY:   CT ABDOMEN PELVIS W IV CONTRAST Additional Contrast? None   Final Result   Status post left hemicolectomy with Donna's pouch noted and left      lower quadrant colostomy with associated parastomal hernia. Fluid-filled   dilated loops of small bowel seen within the hernia sac as well as mild   amount of surrounding fluid most likely reflecting a partial small bowel   obstruction. Cholelithiasis      Mildly fatty liver      RECOMMENDATIONS:   Surgical consultation             LABS:  Labs Reviewed   BASIC METABOLIC PANEL W/ REFLEX TO MG FOR LOW K - Abnormal; Notable for the following components:       Result Value    Glucose 292 (*)     Sodium 133 (*)     Chloride 96 (*)     All other components within normal limits   CBC WITH AUTO DIFFERENTIAL - Abnormal; Notable for the following components:    WBC 25.0 (*)     RBC 5.99 (*)     Hemoglobin 16.8 (*)     Hematocrit 51.9 (*)     Seg Neutrophils 82 (*)     Lymphocytes 15 (*)     Segs Absolute 20.50 (*)     All other components within normal limits   HEPATIC FUNCTION PANEL - Abnormal; Notable for the following components:    Alkaline Phosphatase 207 (*)     Total Protein 8.7 (*)     Albumin/Globulin Ratio 0.9 (*)     All other components within normal limits   LIPASE - Abnormal; Notable for the following components:    Lipase 9 (*)     All other components within normal limits   CULTURE, BLOOD 1   CULTURE, BLOOD 1   LACTIC ACID, WHOLE BLOOD   URINE RT REFLEX TO CULTURE   LACTIC ACID, WHOLE BLOOD           PLAN/ TASKS OUTSTANDING     Partial Small bowel obstruction,  Admitted      (Please note that portions of this note were completed with a voice recognition program.  Efforts were made to edit the dictations but occasionally words are mis-transcribed. )    Agnes Casillas MD,   Attending Emergency Physician

## 2021-08-11 NOTE — PROGRESS NOTES
Attempted to see patient for the h&P. Upon review of medical history and the HPI patient became rude and refused to answer the questions.   Upon review of what the general surgery service had planned for her the patient subsequently stated to me that I needed to \" read the damn chart, she wasn't here to tell me that\"    In order to de-escalate the issue I explained that I would like information from her understanding of the situation and she then stated again \" go read the damn chart\"     Patient would not participate in the ROS nor the exam

## 2021-08-12 ENCOUNTER — APPOINTMENT (OUTPATIENT)
Dept: GENERAL RADIOLOGY | Age: 61
DRG: 254 | End: 2021-08-12
Payer: COMMERCIAL

## 2021-08-12 LAB
ALBUMIN SERPL-MCNC: 2.8 G/DL (ref 3.5–5.2)
ALBUMIN/GLOBULIN RATIO: 0.9 (ref 1–2.5)
ALP BLD-CCNC: 134 U/L (ref 35–104)
ALT SERPL-CCNC: 7 U/L (ref 5–33)
AMYLASE: 22 U/L (ref 28–100)
ANION GAP SERPL CALCULATED.3IONS-SCNC: 10 MMOL/L (ref 9–17)
AST SERPL-CCNC: 8 U/L
BILIRUB SERPL-MCNC: 0.29 MG/DL (ref 0.3–1.2)
BUN BLDV-MCNC: 4 MG/DL (ref 6–20)
BUN/CREAT BLD: ABNORMAL (ref 9–20)
CALCIUM SERPL-MCNC: 8 MG/DL (ref 8.6–10.4)
CHLORIDE BLD-SCNC: 109 MMOL/L (ref 98–107)
CO2: 20 MMOL/L (ref 20–31)
CREAT SERPL-MCNC: 0.45 MG/DL (ref 0.5–0.9)
GFR AFRICAN AMERICAN: >60 ML/MIN
GFR NON-AFRICAN AMERICAN: >60 ML/MIN
GFR SERPL CREATININE-BSD FRML MDRD: ABNORMAL ML/MIN/{1.73_M2}
GFR SERPL CREATININE-BSD FRML MDRD: ABNORMAL ML/MIN/{1.73_M2}
GLUCOSE BLD-MCNC: 109 MG/DL (ref 65–105)
GLUCOSE BLD-MCNC: 124 MG/DL (ref 65–105)
GLUCOSE BLD-MCNC: 89 MG/DL (ref 65–105)
GLUCOSE BLD-MCNC: 94 MG/DL (ref 65–105)
GLUCOSE BLD-MCNC: 95 MG/DL (ref 70–99)
GLUCOSE BLD-MCNC: 99 MG/DL (ref 65–105)
HCT VFR BLD CALC: 36 % (ref 36.3–47.1)
HEMOGLOBIN: 11.1 G/DL (ref 11.9–15.1)
LIPASE: 6 U/L (ref 13–60)
MAGNESIUM: 2.1 MG/DL (ref 1.6–2.6)
MCH RBC QN AUTO: 27.8 PG (ref 25.2–33.5)
MCHC RBC AUTO-ENTMCNC: 30.8 G/DL (ref 28.4–34.8)
MCV RBC AUTO: 90.2 FL (ref 82.6–102.9)
NRBC AUTOMATED: 0 PER 100 WBC
PDW BLD-RTO: 12.9 % (ref 11.8–14.4)
PHOSPHORUS: 3.4 MG/DL (ref 2.6–4.5)
PLATELET # BLD: 268 K/UL (ref 138–453)
PMV BLD AUTO: 11.3 FL (ref 8.1–13.5)
POTASSIUM SERPL-SCNC: 3.6 MMOL/L (ref 3.7–5.3)
RBC # BLD: 3.99 M/UL (ref 3.95–5.11)
SODIUM BLD-SCNC: 139 MMOL/L (ref 135–144)
TOTAL PROTEIN: 6 G/DL (ref 6.4–8.3)
WBC # BLD: 11.3 K/UL (ref 3.5–11.3)

## 2021-08-12 PROCEDURE — 83735 ASSAY OF MAGNESIUM: CPT

## 2021-08-12 PROCEDURE — 74018 RADEX ABDOMEN 1 VIEW: CPT

## 2021-08-12 PROCEDURE — 6370000000 HC RX 637 (ALT 250 FOR IP): Performed by: NURSE PRACTITIONER

## 2021-08-12 PROCEDURE — 6360000002 HC RX W HCPCS: Performed by: NURSE PRACTITIONER

## 2021-08-12 PROCEDURE — 83690 ASSAY OF LIPASE: CPT

## 2021-08-12 PROCEDURE — 84100 ASSAY OF PHOSPHORUS: CPT

## 2021-08-12 PROCEDURE — 6360000002 HC RX W HCPCS: Performed by: FAMILY MEDICINE

## 2021-08-12 PROCEDURE — 80053 COMPREHEN METABOLIC PANEL: CPT

## 2021-08-12 PROCEDURE — 85027 COMPLETE CBC AUTOMATED: CPT

## 2021-08-12 PROCEDURE — 2060000000 HC ICU INTERMEDIATE R&B

## 2021-08-12 PROCEDURE — 82150 ASSAY OF AMYLASE: CPT

## 2021-08-12 PROCEDURE — 2500000003 HC RX 250 WO HCPCS: Performed by: NURSE PRACTITIONER

## 2021-08-12 PROCEDURE — 2580000003 HC RX 258: Performed by: NURSE PRACTITIONER

## 2021-08-12 PROCEDURE — 82947 ASSAY GLUCOSE BLOOD QUANT: CPT

## 2021-08-12 PROCEDURE — 36415 COLL VENOUS BLD VENIPUNCTURE: CPT

## 2021-08-12 PROCEDURE — 99232 SBSQ HOSP IP/OBS MODERATE 35: CPT | Performed by: INTERNAL MEDICINE

## 2021-08-12 RX ORDER — POTASSIUM CHLORIDE 7.45 MG/ML
10 INJECTION INTRAVENOUS ONCE
Status: COMPLETED | OUTPATIENT
Start: 2021-08-12 | End: 2021-08-12

## 2021-08-12 RX ADMIN — FAMOTIDINE 20 MG: 10 INJECTION INTRAVENOUS at 09:41

## 2021-08-12 RX ADMIN — FAMOTIDINE 20 MG: 10 INJECTION INTRAVENOUS at 21:38

## 2021-08-12 RX ADMIN — CARVEDILOL 3.12 MG: 3.12 TABLET, FILM COATED ORAL at 21:38

## 2021-08-12 RX ADMIN — MORPHINE SULFATE 2 MG: 2 INJECTION, SOLUTION INTRAMUSCULAR; INTRAVENOUS at 21:54

## 2021-08-12 RX ADMIN — Medication 81 MG: at 09:40

## 2021-08-12 RX ADMIN — POTASSIUM CHLORIDE 10 MEQ: 7.46 INJECTION, SOLUTION INTRAVENOUS at 00:58

## 2021-08-12 RX ADMIN — ATORVASTATIN CALCIUM 80 MG: 80 TABLET, FILM COATED ORAL at 09:39

## 2021-08-12 RX ADMIN — ENOXAPARIN SODIUM 40 MG: 40 INJECTION SUBCUTANEOUS at 09:40

## 2021-08-12 RX ADMIN — CARVEDILOL 3.12 MG: 3.12 TABLET, FILM COATED ORAL at 09:39

## 2021-08-12 RX ADMIN — MORPHINE SULFATE 2 MG: 2 INJECTION, SOLUTION INTRAMUSCULAR; INTRAVENOUS at 09:35

## 2021-08-12 RX ADMIN — SODIUM CHLORIDE, PRESERVATIVE FREE 10 ML: 5 INJECTION INTRAVENOUS at 09:44

## 2021-08-12 ASSESSMENT — PAIN SCALES - GENERAL
PAINLEVEL_OUTOF10: 8
PAINLEVEL_OUTOF10: 3
PAINLEVEL_OUTOF10: 7

## 2021-08-12 NOTE — PROGRESS NOTES
[facility]  Office: [address], DO, Denis Ramandeep, DO, Tasneem Keita, DO, Caprice Jefferson Blood, DO, Екатерина Mccollum MD, Belgica Collins MD, Tika Cartagena MD, Melonie Crawley MD, Burak Henriquez MD, Maria M Melton MD, Alysia Wise MD, Lisa Martinez, DO, Carlin Christie MD, Tamara Connelly DO, Joao Ruffin MD,  Ritesh Gr DO, Lane Grace MD, Red Liriano MD, Corinne Powell MD, Beth Larose MD, Yossi Green MD, Olivia Nicole MD, Laverne Fernando MD, Uzma Ahn, [facility], [facility], CNP, Carol Molina, CNP, Jimmy Lua, CNS, Forrest Pastfermin, CNP, Jess Woods, CNP, Antonella Brown, CNP, Britt Gonsalez, CNP, Danielle Jordan, CNP, Georgie Deluca PA-C, Jacob Aleman, [facility], Trevon Grace, CNP, Otilio Tate, CNP, Tray Brooke, CNP, Zach Patten, CNP, Avtar Ferguson, CNP, Mariam Hall, CNP, Deny Rutherford, CNP, Tere Boehringer, [address] [place] [address]    Progress Note    8/12/2021    10:17 AM    Name:   Martin Alegre  MRN:     [id number]     [place]:      [de-identified]   Room:   2009/2009-01  IP Day:  1  Admit Date:  8/10/2021  6:17 PM    PCP:   Yomaira Goddard  Code Status:  Full Code    Subjective:     C/C:   Chief Complaint   Patient presents with   Floydene Ada Abdominal Pain    Nausea    Emesis     Interval History Status: not changed. No issues overnight  NG clamped  Pain controlled  Ostomy having gas, no output  Asking about food  No other complaints  Discussed with RN    Brief History:     The patient is a 62 y.o. F who came to emergency room with acute onset abdominal pain associated with nausea, vomiting. Patient had pain for 24 hours prior to presentation. She reported multiple episodes of vomiting and loose stools in the colostomy bag. She reports history of small bowel obstruction before due to colonic stricture. She had multiple surgeries. Patient denies any fever, blood in stool.   Other comorbidities include GERD, disease), Headache(784.0), Hyperlipidemia, Hypertension, Knee pain, MDRO (multiple drug resistant organisms) resistance, Narcotic abuse ([address] Obesity, Osteoarthritis, Rectovaginal fistula, Type II or unspecified type diabetes mellitus without mention of complication, not stated as uncontrolled, and Urinary incontinence. Social History:   reports that she has been smoking cigarettes. She started smoking about 31 years ago. She has a 30.00 pack-year smoking history. She has never used smokeless tobacco. She reports that she does not drink alcohol and does not use drugs. Family History:   Family History   Problem Relation Age of Onset    Diabetes Mother     Cancer Father         COLON, PROSTATE    Heart Disease Paternal Aunt        Vitals:  /72   Pulse 80   Temp 98.8 °F (37.1 °C) (Oral)   Resp 14   Ht 5' 3\" (1.6 m)   Wt 194 lb 11.2 oz (88.3 kg)   SpO2 95%   BMI 34.49 kg/m²   Temp (24hrs), Av.5 °F (36.9 °C), Min:97.9 °F (36.6 °C), Max:98.8 °F (37.1 °C)    Recent Labs     21  2353 21  0402 21  0627   POCGLU 135* 147* 94 89       I/O (24Hr):     Intake/Output Summary (Last 24 hours) at 2021 1017  Last data filed at 2021 3384  Gross per 24 hour   Intake 2000 ml   Output 350 ml   Net 1650 ml       Labs:  Hematology:  Recent Labs     08/10/21  18421  0730 21  0406   WBC 25.0* 12.7* 11.3   RBC 5.99* 4.60 3.99   HGB 16.8* 12.8 11.1*   HCT 51.9* 40.9 36.0*   MCV 86.6 88.9 90.2   MCH 28.0 27.8 27.8   MCHC 32.4 31.3 30.8   RDW 12.9 13.0 12.9    286 268   MPV 10.6 11.0 11.3     Chemistry:  Recent Labs     08/10/21  1841 08/10/21  1929 21  0053 21  0730 21  0406   *  --   --  137 139   K 4.2  --   --  3.4* 3.6*   CL 96*  --   --  107 109*   CO2 24  --   --  21 20   GLUCOSE 292*  --   --  168* 95   BUN 10  --   --  7 4*   CREATININE 0.69  --   --  0.43* 0.45*   MG  --   --   --  1.6 2.1   ANIONGAP 13  --   --  9 10 LABGLOM >60  --   --  >60 >60   GFRAA >60  --   --  >60 >60   CALCIUM 9.6  --   --  8.3* 8.0*   PHOS  --   --   --   --  3.4   LACTACIDWB  --  1.2 0.7  --   --      Recent Labs     08/10/21  1841 08/11/21  0432 08/11/21  0509 08/11/21  1202 08/11/21  1543 08/11/21  2047 08/11/21  2353 08/12/21  0402 08/12/21  0406 08/12/21  0627   PROT 8.7*  --   --   --   --   --   --   --  6.0*  --    LABALBU 4.0  --   --   --   --   --   --   --  2.8*  --    LABA1C  --  10.5*  --   --   --   --   --   --   --   --    AST 17  --   --   --   --   --   --   --  8  --    ALT 13  --   --   --   --   --   --   --  7  --    ALKPHOS 207*  --   --   --   --   --   --   --  134*  --    BILITOT 0.35  --   --   --   --   --   --   --  0.29*  --    BILIDIR 0.08  --   --   --   --   --   --   --   --   --    AMYLASE  --   --   --   --   --   --   --   --  22*  --    LIPASE 9*  --   --   --   --   --   --   --  6*  --    POCGLU  --   --    < > 154* 161* 135* 147* 94  --  89    < > = values in this interval not displayed. ABG:  Lab Results   Component Value Date    POCPH 7.42 12/27/2016    PHART 7.356 12/25/2016    POCPCO2 41 12/27/2016    FFY3KGQ 39.7 12/25/2016    POCPO2 78 12/27/2016    PO2ART 93.6 12/25/2016    POCHCO3 26.4 12/27/2016    IAK1OPH 21.7 12/25/2016    NBEA NOT REPORTED 12/27/2016    PBEA 2 12/27/2016    IWZ4XFX 28 12/27/2016    ZJNY9EMF 96 12/27/2016    T9THZXCH 96.0 12/25/2016    FIO2 40.0 12/27/2016     Lab Results   Component Value Date/Time    SPECIAL L AC 10 ML 08/10/2021 08:45 PM     Lab Results   Component Value Date/Time    CULTURE NO GROWTH 2 DAYS 08/10/2021 08:45 PM       Radiology:  XR ABDOMEN (KUB) (SINGLE AP VIEW)    Result Date: 8/12/2021  Nonspecific bowel gas pattern without convincing evidence for obstruction. XR ABDOMEN (KUB) (SINGLE AP VIEW)    Result Date: 8/12/2021  1. Diffuse central abdominal wall small-bowel dilatation consistent with persisting distal small bowel obstruction versus ileus.  2. Appropriate radiographic positioning nasogastric tube. CT ABDOMEN PELVIS W IV CONTRAST Additional Contrast? None    Result Date: 8/10/2021  Status post left hemicolectomy with Donna's pouch noted and left lower quadrant colostomy with associated parastomal hernia. Fluid-filled dilated loops of small bowel seen within the hernia sac as well as mild amount of surrounding fluid most likely reflecting a partial small bowel obstruction. Cholelithiasis Mildly fatty liver RECOMMENDATIONS: Surgical consultation     XR ABDOMEN FOR NG/OG/NE TUBE PLACEMENT    Result Date: 8/11/2021  Appropriate radiographic positioning of nasogastric tube. Recommend clinical correlation prior to use.        Physical Examination:        General appearance:  alert, cooperative and no distress  Mental Status:  oriented to person, place and time and normal affect  Lungs:  clear to auscultation bilaterally, normal effort  Heart:  regular rate and rhythm  Abdomen:  soft, tender, nondistended, normal bowel sounds  Extremities:  no edema, redness, tenderness in the calves  Skin:  no gross lesions, rashes, induration    Assessment:        Hospital Problems         Last Modified POA    * (Principal) Partial small bowel obstruction ([address] 8/11/2021 Yes    Essential hypertension 8/11/2021 Yes    Uncontrolled type 2 diabetes mellitus with hyperglycemia, with long-term current use of insulin ([address] 8/11/2021 Yes    MIKY (obstructive sleep apnea) 8/11/2021 Yes          Plan:        - Vitals, labs, imaging, medications reviewed  - Surgery following - NG in place, clamp today  - Diet per surgery  - Pain control  - Serial abdominal exams  - Continue home insulin - partial home dose while NPO  - Monitor glucose, insulin correction   - Continue home BP medications   - NG management per surgery     Nissa Baez MD  8/12/2021  10:17 AM

## 2021-08-12 NOTE — FLOWSHEET NOTE
SPIRITUAL CARE DEPARTMENT - [address]  PROGRESS NOTE    Shift date: 8/11/21  Shift day: Wednesday   Shift # 2    Room # 2009/2009-01   Name: Sai Henson            Age: 62 y.o. Gender: female          [employer]: [address] [place] [address] of [employer]: Unknown    Referral: Routine Visit    Admit Date & Time: 8/10/2021  6:17 PM    PATIENT/EVENT DESCRIPTION:  Sai Henson is a 62 y.o. female in room 2009. SPIRITUAL ASSESSMENT/INTERVENTION:   entered room and met and conversed with Pt. Pt was grateful for the visitation.  provided spiritual support and asked if Pt was receiving emotional support from family.  offered prayer. Pt accepted request. Adriel Soares proceeded to pray for the Pt. Pt was grateful for the visitation and the prayer. SPIRITUAL CARE FOLLOW-UP PLAN:  Chaplains will remain available to offer spiritual and emotional support as needed. Electronically signed by Benton Goodman, on 8/11/2021 at 9:54 PM.  [employer]  [phone]       08/11/21 2152   Encounter Summary   Services provided to: Patient   Referral/Consult From: [facility] Children;Parent; Family members   Place of [facility]   Continue Visiting   (8/11/21)   Complexity of Encounter Moderate   Length of Encounter 30 minutes   Spiritual Assessment Completed Yes   Routine   Type Initial   Assessment Approachable;Coping;Calm; Anxious;Sleeping   Intervention Active listening;Explored feelings, thoughts, concerns;Explored coping resources;Nurtured hope;Prayer;Provided reading materials/devotional materials   Outcome Acceptance;Comfort;Expressed gratitude;Engaged in conversation;Venting emotion;Receptive; Hopeful;Encouraged; Less anxious, less agitated;Coping;Expressed feelings/needs/concerns

## 2021-08-12 NOTE — PROGRESS NOTES
[facility] General Surgery Progress Note            PATIENT NAME: Rukhsana Ortez     TODAY'S DATE: 2021, 5:33 AM    Chief Complaint   Patient presents with    Abdominal Pain    Nausea    Emesis       SUBJECTIVE:    Pt seen and examined. Reports bowel movements with no nausea  Ng in place, minimal output. OBJECTIVE:   Vitals:  /72   Pulse 80   Temp 98.8 °F (37.1 °C) (Oral)   Resp 14   Ht 5' 3\" (1.6 m)   Wt 194 lb 11.2 oz (88.3 kg)   SpO2 95%   BMI 34.49 kg/m²    TEMPERATURE:  Current - Temp: 98.8 °F (37.1 °C);  Max - Temp  Av.5 °F (36.9 °C)  Min: 97.9 °F (36.6 °C)  Max: 98.8 °F (37.1 °C)    INTAKE/OUTPUT:    No intake or output data in the 24 hours ending 21 0533              General: AOx3, NAD  Lungs: Symmetrical chest rise bilaterally, no audible wheezes or rhonchi  Heart: S1S2  Abdomen: Soft, ND, minimal tenderness, large peristoma hernia, stoma pink, stool and air in bag  Extremity: moves all extremities x4, No edema      Data Review:  CBC:   Recent Labs     08/10/21  1841 08/11/21  0730 21  0406   WBC 25.0* 12.7* 11.3   HGB 16.8* 12.8 11.1*    286 268     BMP:    Recent Labs     08/10/21  1841 08/11/21  0730 21  0406   * 137 139   K 4.2 3.4* 3.6*   CL 96* 107 109*   CO2    BUN 10 7 4*   CREATININE 0.69 0.43* 0.45*   GLUCOSE 292* 168* 95     Hepatic:   Recent Labs     08/10/21  1841 08/12/21  0406   AST 17 8   ALT 13 7   ALKPHOS 207* 134*   BILITOT 0.35 0.29*   BILIDIR 0.08  --      Coagulation:   Recent Labs     08/10/21  1841 08/12/21  0406   PROT 8.7* 6.0*             ASSESSMENT     Patient Active Problem List   Diagnosis    Diverticulitis of large intestine with abscess without bleeding    Leukocytosis    Morbid obesity with BMI of 40.0-44.9, adult ([address]    Wound, open, abdominal wall, anterior    Incisional hernia, without obstruction or gangrene    Pulmonary embolism ([address]    Essential hypertension    Chronic back pain    Narcotic abuse ([address] Generalized abdominal pain    Partial small bowel obstruction (HCC)    Sigmoid stricture (HCC)    Uncontrolled type 2 diabetes mellitus with hyperglycemia, with long-term current use of insulin (HCC)    Hyperglycemia    MIKY (obstructive sleep apnea)    Atelectasis    Hypocalcemia    Vitamin D deficiency    Acute cystitis with hematuria    Hypokalemia    Colon obstruction (HCC)       PLAN    No surgery planned as patient continues to improve  Plan for clamp trial; plan to advance diet as tolerated  Continue to encourage ambulation        General Surgery Resident Statement/Note:  I have discussed the case, including pertinent history and exam findings with the above resident, I have personally seen the patient. Pt seen and examined at bedside. I performed both history and physical exam.     Note was edited and changes made by me. Assessment and plan reviewed and changes made by me. I agree with the assessment and plan as stated above. Patient doing better this morning, NG tube in place, minimal output, abdomen soft, large parastomal hernia, stoma with stool and air noted throughout bag. We will try NG tube clamp trial today and start clear liquid diet if tolerates. Electronically signed by Brien Mahajan DO on 8/12/2021 at 7:23 AM    Attending Physician Statement  I have discussed the case with Dr Kayla Robles, including pertinent history and exam findings with the resident. I have seen and examined the patient and the key elements of the encounter have been performed by me. I agree with the assessment, plan and orders as documented by the resident.       Electronically signed by Daniel Grey IV, DO  on 8/13/2021 at 7:26 PM

## 2021-08-13 VITALS
SYSTOLIC BLOOD PRESSURE: 115 MMHG | DIASTOLIC BLOOD PRESSURE: 65 MMHG | HEART RATE: 74 BPM | WEIGHT: 194.7 LBS | TEMPERATURE: 97.1 F | HEIGHT: 63 IN | BODY MASS INDEX: 34.5 KG/M2 | RESPIRATION RATE: 23 BRPM | OXYGEN SATURATION: 97 %

## 2021-08-13 LAB
GLUCOSE BLD-MCNC: 121 MG/DL (ref 65–105)
GLUCOSE BLD-MCNC: 142 MG/DL (ref 65–105)
GLUCOSE BLD-MCNC: 89 MG/DL (ref 65–105)
GLUCOSE BLD-MCNC: 99 MG/DL (ref 65–105)

## 2021-08-13 PROCEDURE — 82947 ASSAY GLUCOSE BLOOD QUANT: CPT

## 2021-08-13 PROCEDURE — 6360000002 HC RX W HCPCS: Performed by: NURSE PRACTITIONER

## 2021-08-13 PROCEDURE — 2500000003 HC RX 250 WO HCPCS: Performed by: NURSE PRACTITIONER

## 2021-08-13 PROCEDURE — 6360000002 HC RX W HCPCS: Performed by: FAMILY MEDICINE

## 2021-08-13 PROCEDURE — 6370000000 HC RX 637 (ALT 250 FOR IP): Performed by: NURSE PRACTITIONER

## 2021-08-13 PROCEDURE — 6370000000 HC RX 637 (ALT 250 FOR IP): Performed by: INTERNAL MEDICINE

## 2021-08-13 PROCEDURE — 99239 HOSP IP/OBS DSCHRG MGMT >30: CPT | Performed by: INTERNAL MEDICINE

## 2021-08-13 RX ORDER — OXYCODONE HYDROCHLORIDE 5 MG/1
10 TABLET ORAL EVERY 4 HOURS PRN
Status: DISCONTINUED | OUTPATIENT
Start: 2021-08-13 | End: 2021-08-13 | Stop reason: HOSPADM

## 2021-08-13 RX ORDER — ONDANSETRON 4 MG/1
4 TABLET, ORALLY DISINTEGRATING ORAL EVERY 8 HOURS PRN
Qty: 10 TABLET | Refills: 0 | Status: SHIPPED | OUTPATIENT
Start: 2021-08-13 | End: 2021-08-16

## 2021-08-13 RX ORDER — OXYCODONE HYDROCHLORIDE 5 MG/1
5 TABLET ORAL EVERY 4 HOURS PRN
Status: DISCONTINUED | OUTPATIENT
Start: 2021-08-13 | End: 2021-08-13 | Stop reason: HOSPADM

## 2021-08-13 RX ORDER — OXYCODONE HYDROCHLORIDE 5 MG/1
5 TABLET ORAL EVERY 6 HOURS PRN
Qty: 12 TABLET | Refills: 0 | Status: SHIPPED | OUTPATIENT
Start: 2021-08-13 | End: 2021-08-16

## 2021-08-13 RX ADMIN — ATORVASTATIN CALCIUM 80 MG: 80 TABLET, FILM COATED ORAL at 08:53

## 2021-08-13 RX ADMIN — ENOXAPARIN SODIUM 40 MG: 40 INJECTION SUBCUTANEOUS at 08:53

## 2021-08-13 RX ADMIN — FAMOTIDINE 20 MG: 10 INJECTION INTRAVENOUS at 08:53

## 2021-08-13 RX ADMIN — CARVEDILOL 3.12 MG: 3.12 TABLET, FILM COATED ORAL at 08:53

## 2021-08-13 RX ADMIN — Medication 81 MG: at 08:53

## 2021-08-13 RX ADMIN — MORPHINE SULFATE 2 MG: 2 INJECTION, SOLUTION INTRAMUSCULAR; INTRAVENOUS at 07:23

## 2021-08-13 RX ADMIN — OXYCODONE HYDROCHLORIDE 10 MG: 5 TABLET ORAL at 13:58

## 2021-08-13 ASSESSMENT — PAIN SCALES - GENERAL
PAINLEVEL_OUTOF10: 9
PAINLEVEL_OUTOF10: 0
PAINLEVEL_OUTOF10: 7
PAINLEVEL_OUTOF10: 9

## 2021-08-13 NOTE — PLAN OF CARE
Problem: Pain:  Goal: Pain level will decrease  Description: Pain level will decrease  8/13/2021 1539 by Karyle Doheny, RN  Outcome: Completed     Problem: Pain:  Goal: Control of acute pain  Description: Control of acute pain  Outcome: Completed     Problem: Pain:  Goal: Control of chronic pain  Description: Control of chronic pain  Outcome: Completed     Problem: Discharge Planning:  Goal: Discharged to appropriate level of care  Description: Discharged to appropriate level of care  8/13/2021 1539 by Karyle Doheny, RN  Outcome: Completed     Problem: Falls - Risk of:  Goal: Will remain free from falls  Description: Will remain free from falls  Outcome: Completed     Problem: Falls - Risk of:  Goal: Absence of physical injury  Description: Absence of physical injury  Outcome: Completed     Problem: Fluid Volume:  Goal: Ability to maintain a balanced intake and output will improve  Description: Ability to maintain a balanced intake and output will improve  Outcome: Completed     Problem: Metabolic:  Goal: Ability to maintain appropriate glucose levels will improve  Description: Ability to maintain appropriate glucose levels will improve  Outcome: Completed   Electronically signed by Smith Gallegos RN on 8/13/2021 at 3:40 PM

## 2021-08-13 NOTE — PROGRESS NOTES
CLINICAL PHARMACY NOTE: MEDS TO BEDS    Total # of Prescriptions Filled: 2   The following medications were delivered to the patient:  · Oxycodone 5 mg tab  · Ondansetron 4 mg odt    Additional Documentation:Medications delivered to the patient in her room. 2009 8/13 @ 2:10pm no copay.

## 2021-08-13 NOTE — PROGRESS NOTES
Patient d/c home with all belongings. Patient assisted to car.   Electronically signed by Latasha Bright RN on 8/13/2021 at 5:16 PM

## 2021-08-13 NOTE — PROGRESS NOTES
General Surgery:  Daily Progress Note                    PATIENT NAME: Elisabeth Camargo     TODAY'S DATE: 8/13/2021, 7:00 AM    SUBJECTIVE:     Pt seen and examined at bedside this morning. No acute events overnight. Afebrile. Tolerating clear liquid diet. No nausea or vomiting. NG tube with minimal output. Flatus and stool noted in ostomy appliance. OBJECTIVE:   VITALS:  BP (!) 101/58   Pulse 81   Temp 98.5 °F (36.9 °C) (Oral)   Resp 18   Ht 5' 3\" (1.6 m)   Wt 194 lb 11.2 oz (88.3 kg)   SpO2 97%   BMI 34.49 kg/m²      INTAKE/OUTPUT:      Intake/Output Summary (Last 24 hours) at 8/13/2021 0700  Last data filed at 8/13/2021 0325  Gross per 24 hour   Intake    Output 350 ml   Net -350 ml       PHYSICAL EXAM:  General Appearance:  awake, alert, oriented, in no acute distress  HEENT:  Normocephalic, atraumatic, mucus membranes moist   Skin:  Skin color, texture, turgor normal. No rashes or lesions. Lungs:  No chest wall tenderness. Heart:  Heart regular rate and rhythm  Abdomen: Soft, nontender, nondistended, parastomal hernia noted along left side of abdomen, stoma pink with stool and air noted in bag. Extremities: Extremities warm to touch, pink, with no edema.       Data:  CBC with Differential:    Lab Results   Component Value Date    WBC 11.3 08/12/2021    RBC 3.99 08/12/2021    HGB 11.1 08/12/2021    HCT 36.0 08/12/2021     08/12/2021    MCV 90.2 08/12/2021    MCH 27.8 08/12/2021    MCHC 30.8 08/12/2021    RDW 12.9 08/12/2021    METASPCT 2 12/26/2016    LYMPHOPCT 25 08/11/2021    MONOPCT 5 08/11/2021    BASOPCT 0 08/11/2021    MONOSABS 0.58 08/11/2021    LYMPHSABS 3.23 08/11/2021    EOSABS 0.20 08/11/2021    BASOSABS <0.03 08/11/2021    DIFFTYPE NOT REPORTED 08/11/2021     BMP:    Lab Results   Component Value Date     08/12/2021    K 3.6 08/12/2021     08/12/2021    CO2 20 08/12/2021    BUN 4 08/12/2021    LABALBU 2.8 08/12/2021    CREATININE 0.45 08/12/2021    CALCIUM 8.0 08/12/2021    GFRAA >60 08/12/2021    LABGLOM >60 08/12/2021    GLUCOSE 95 08/12/2021         ASSESSMENT:  Active Hospital Problems    Diagnosis Date Noted    MIKY (obstructive sleep apnea) [G47.33]     Uncontrolled type 2 diabetes mellitus with hyperglycemia, with long-term current use of insulin (HCC) [E11.65, Z79.4]     Partial small bowel obstruction ([facility] [K56.600]     Essential hypertension [I10] 02/19/2015       62 y.o. female status post multiple abdominal surgeries with end colostomy, resolving small bowel obstruction    Plan:  Patient seen examined at bedside this morning. NG tube removed, minimal output, passing flatus and having bowel movements in ostomy appliance. Tolerated clear liquid diet for the last 24 hours. Will advance to full liquid. Encourage patient be out of bed to ambulate. Patient overall improving, obstruction resolving, no acute surgical intervention. Continue medical management per primary team.    Electronically signed by Maureen Edmondson DO  on 8/13/2021 at 7:00 AM     Attending Physician Statement  I have discussed the case with Dr Leland Carlos, including pertinent history and exam findings with the resident. I have seen and examined the patient and the key elements of the encounter have been performed by me. I agree with the assessment, plan and orders as documented by the resident.       Electronically signed by Jose Grey IV, DO  on 8/13/2021 at 7:26 PM

## 2021-08-13 NOTE — PROGRESS NOTES
[facility]  Office: [address], DO, Kristen Harper, DO, Heidi Nitin, DO, Berto Hilton Blood, DO, Moses Patton MD, Zaheer Correa MD, Deedee Miranda MD, Jake Johnston MD, Yenny Hidalgo MD, Juan Ramon Fiore MD, Gibran Tee MD, Lauren Carmen, DO, Varsha Stephens MD, Julieth Skelton DO, Lucio Liu MD,  Dakota Dunbar DO, Shawn Nixon MD, Christopher Brown MD, Kayla Barry MD, Sofia Nayak MD, Laverne Huber MD, Trang Kirby MD, Vignesh Joseph MD, Amee Barajas, [facility], [facility], CNP, Aiyana Basiilo, CNP, Mel Figueroa, CNS, Bette Andrea, CNP, Aayush Velazquez, [facility], Shankar Cassidy, CNP, Raquel Arellano, CNP, Bessy Isaac, CNP, CHRISTINE PhillipsC, Pennie Camarillo, [facility], Nahid Farmer, CNP, Arnaud Hampton, CNP, Zac Lo, CNP, Flora Deluna, CNP, Nivia Madera, CNP, Thor Gonzalez, CNP, Vaughn Nissen, CNP, Charo Faulkner, [address] [place] [address]    Progress Note    8/13/2021    11:51 AM    Name:   Jia Valerio  MRN:     [id number]     Dwain Schaumann:      [de-identified]   Room:   2009/2009-01  IP Day:  2  Admit Date:  8/10/2021  6:17 PM    PCP:   Tyler Abdullahi  Code Status:  Full Code    Subjective:     C/C:   Chief Complaint   Patient presents with   Elida Messina Abdominal Pain    Nausea    Emesis     Interval History Status: not changed. No issues overnight  Tolerating liquid diet  Ostomy working  No other complaints  Discussed with RN    Brief History:     The patient is a 62 y.o. F who came to emergency room with acute onset abdominal pain associated with nausea, vomiting. Patient had pain for 24 hours prior to presentation. She reported multiple episodes of vomiting and loose stools in the colostomy bag. She reports history of small bowel obstruction before due to colonic stricture. She had multiple surgeries. Patient denies any fever, blood in stool.   Other comorbidities include GERD, hypertension, hyperlipidemia, rectovaginal fistula, type 2 diabetes mellitus. Evaluation in emergency room showed normal kidney function, leukocytosis 25.0, EKG showed sinus tachycardia. CT abdomen showed left lower quadrant colostomy with parastomal hernia dilated with small bowel loops in hernia sac. Review of Systems:     Constitutional:  negative for chills, fevers, sweats  Respiratory:  negative for cough, dyspnea on exertion, shortness of breath, wheezing  Cardiovascular:  negative for chest pain, chest pressure/discomfort, lower extremity edema, palpitations  Gastrointestinal:  negative for abdominal pain, constipation, diarrhea, nausea, vomiting  Neurological:  negative for dizziness, headache    Medications: Allergies:     Allergies   Allergen Reactions    Lisinopril Swelling    Ace Inhibitors Swelling     ANGIOEDEMA    Ciprofloxacin Hives    Metaxalone     Other Hives and Swelling     seafood    Penicillins Hives and Swelling       Current Meds:   Scheduled Meds:    aspirin  81 mg Oral Daily    atorvastatin  80 mg Oral Daily    carvedilol  3.125 mg Oral BID    famotidine (PEPCID) injection  20 mg Intravenous BID    sodium chloride flush  5-40 mL Intravenous 2 times per day    enoxaparin  40 mg Subcutaneous Daily    insulin lispro  0-12 Units Subcutaneous Q4H    insulin glargine  50 Units Subcutaneous Nightly     Continuous Infusions:    sodium chloride      dextrose       PRN Meds: sodium chloride flush, sodium chloride, potassium chloride **OR** potassium alternative oral replacement **OR** potassium chloride, magnesium sulfate, ondansetron **OR** ondansetron, acetaminophen **OR** acetaminophen, polyethylene glycol, glucose, dextrose, glucagon (rDNA), dextrose, morphine    Data:     Past Medical History:   has a past medical history of Allergic rhinitis, Arthritis, Chronic back pain, Diverticulitis, GERD (gastroesophageal reflux disease), Headache(784.0), Hyperlipidemia, Hypertension, Knee pain, MDRO (multiple drug resistant organisms) resistance, Narcotic abuse ([address] Obesity, Osteoarthritis, Rectovaginal fistula, Type II or unspecified type diabetes mellitus without mention of complication, not stated as uncontrolled, and Urinary incontinence. Social History:   reports that she has been smoking cigarettes. She started smoking about 31 years ago. She has a 30.00 pack-year smoking history. She has never used smokeless tobacco. She reports that she does not drink alcohol and does not use drugs. Family History:   Family History   Problem Relation Age of Onset    Diabetes Mother     Cancer Father         COLON, PROSTATE    Heart Disease Paternal Aunt        Vitals:  /66   Pulse 74   Temp 97.6 °F (36.4 °C) (Oral)   Resp 21   Ht 5' 3\" (1.6 m)   Wt 194 lb 11.2 oz (88.3 kg)   SpO2 97%   BMI 34.49 kg/m²   Temp (24hrs), Av.3 °F (36.8 °C), Min:97.6 °F (36.4 °C), Max:98.6 °F (37 °C)    Recent Labs     21  1639 21  0301 21  0851   POCGLU 124* 109* 89 99       I/O (24Hr):     Intake/Output Summary (Last 24 hours) at 2021 1151  Last data filed at 2021 0900  Gross per 24 hour   Intake 360 ml   Output 350 ml   Net 10 ml       Labs:  Hematology:  Recent Labs     08/10/21  18421  0730 21  0406   WBC 25.0* 12.7* 11.3   RBC 5.99* 4.60 3.99   HGB 16.8* 12.8 11.1*   HCT 51.9* 40.9 36.0*   MCV 86.6 88.9 90.2   MCH 28.0 27.8 27.8   MCHC 32.4 31.3 30.8   RDW 12.9 13.0 12.9    286 268   MPV 10.6 11.0 11.3     Chemistry:  Recent Labs     08/10/21  1841 08/10/21  19221  0053 21  0730 21  0406   *  --   --  137 139   K 4.2  --   --  3.4* 3.6*   CL 96*  --   --  107 109*   CO2 24  --   --  21 20   GLUCOSE 292*  --   --  168* 95   BUN 10  --   --  7 4*   CREATININE 0.69  --   --  0.43* 0.45*   MG  --   --   --  1.6 2.1   ANIONGAP 13  --   --  9 10   LABGLOM >60  --   --  >60 >60   GFRAA >60  --   --  >60 >60   CALCIUM 9.6  --   --  8.3* 8.0* PHOS  --   --   --   --  3.4   LACTACIDWB  --  1.2 0.7  --   --      Recent Labs     08/10/21  1841 08/11/21  0432 08/11/21  0509 08/12/21  0402 08/12/21  0406 08/12/21  0627 08/12/21  1132 08/12/21  1639 08/12/21 2015 08/13/21  0301 08/13/21  0851   PROT 8.7*  --   --   --  6.0*  --   --   --   --   --   --    LABALBU 4.0  --   --   --  2.8*  --   --   --   --   --   --    LABA1C  --  10.5*  --   --   --   --   --   --   --   --   --    AST 17  --   --   --  8  --   --   --   --   --   --    ALT 13  --   --   --  7  --   --   --   --   --   --    ALKPHOS 207*  --   --   --  134*  --   --   --   --   --   --    BILITOT 0.35  --   --   --  0.29*  --   --   --   --   --   --    BILIDIR 0.08  --   --   --   --   --   --   --   --   --   --    AMYLASE  --   --   --   --  22*  --   --   --   --   --   --    LIPASE 9*  --   --   --  6*  --   --   --   --   --   --    POCGLU  --   --    < >   < >  --  89 99 124* 109* 89 99    < > = values in this interval not displayed. ABG:  Lab Results   Component Value Date    POCPH 7.42 12/27/2016    PHART 7.356 12/25/2016    POCPCO2 41 12/27/2016    UVD6FLZ 39.7 12/25/2016    POCPO2 78 12/27/2016    PO2ART 93.6 12/25/2016    POCHCO3 26.4 12/27/2016    IVW3MDA 21.7 12/25/2016    NBEA NOT REPORTED 12/27/2016    PBEA 2 12/27/2016    QAB8IVK 28 12/27/2016    ZIXX6INP 96 12/27/2016    W8IAKWZP 96.0 12/25/2016    FIO2 40.0 12/27/2016     Lab Results   Component Value Date/Time    SPECIAL L AC 10 ML 08/10/2021 08:45 PM     Lab Results   Component Value Date/Time    CULTURE NO GROWTH 3 DAYS 08/10/2021 08:45 PM       Radiology:  XR ABDOMEN (KUB) (SINGLE AP VIEW)    Result Date: 8/12/2021  Nonspecific bowel gas pattern without convincing evidence for obstruction. XR ABDOMEN (KUB) (SINGLE AP VIEW)    Result Date: 8/12/2021  1. Diffuse central abdominal wall small-bowel dilatation consistent with persisting distal small bowel obstruction versus ileus.  2. Appropriate radiographic positioning nasogastric tube. CT ABDOMEN PELVIS W IV CONTRAST Additional Contrast? None    Result Date: 8/10/2021  Status post left hemicolectomy with Donna's pouch noted and left lower quadrant colostomy with associated parastomal hernia. Fluid-filled dilated loops of small bowel seen within the hernia sac as well as mild amount of surrounding fluid most likely reflecting a partial small bowel obstruction. Cholelithiasis Mildly fatty liver RECOMMENDATIONS: Surgical consultation     XR ABDOMEN FOR NG/OG/NE TUBE PLACEMENT    Result Date: 8/11/2021  Appropriate radiographic positioning of nasogastric tube. Recommend clinical correlation prior to use.        Physical Examination:        General appearance:  alert, cooperative and no distress  Mental Status:  oriented to person, place and time and normal affect  Lungs:  clear to auscultation bilaterally, normal effort  Heart:  regular rate and rhythm  Abdomen:  soft, tender, nondistended, normal bowel sounds  Extremities:  no edema, redness, tenderness in the calves  Skin:  no gross lesions, rashes, induration    Assessment:        Hospital Problems         Last Modified POA    * (Principal) Partial small bowel obstruction ([address] 8/11/2021 Yes    Essential hypertension 8/11/2021 Yes    Uncontrolled type 2 diabetes mellitus with hyperglycemia, with long-term current use of insulin ([address] 8/11/2021 Yes    MIKY (obstructive sleep apnea) 8/11/2021 Yes          Plan:        - Vitals, labs, imaging, medications reviewed  - Surgery following - NG removed   - Diet per surgery  - Pain control  - Serial abdominal exams  - Continue home insulin - partial home dose while NPO  - Monitor glucose, insulin correction   - Continue home BP medications   - Advance diet, dc if tolerating PO     Maria M Melton MD  8/13/2021  11:51 AM

## 2021-08-13 NOTE — CARE COORDINATION
Discharge [facility] Case Management Department  Written by: Ngoc Nino RN    Patient Name: Wendy Jimenes  Attending Provider: No att. providers found  Admit Date: 8/10/2021  6:17 PM  MRN: [id number]  Account: [de-identified]                     : 1960  Discharge Date: 2021      Disposition: home    Ngoc Nino RN

## 2021-08-13 NOTE — PLAN OF CARE
Problem: Pain:  Description: Pain management should include both nonpharmacologic and pharmacologic interventions.   Goal: Pain level will decrease  8/13/2021 0426 by Melinda López RN  Outcome: Met This Shift  8/12/2021 1611 by Maximilian Dial RN  Outcome: Ongoing     Problem: Discharge Planning:  Goal: Discharged to appropriate level of care  8/13/2021 0426 by Melinda López RN  Outcome: Met This Shift  8/12/2021 1611 by Maximilian Dial RN  Outcome: Ongoing

## 2021-08-13 NOTE — PROGRESS NOTES
Patient notified RN that her ride home will be arriving sometime after 5pm.   Electronically signed by Asha Knutson RN on 8/13/2021 at 4:04 PM

## 2021-08-14 NOTE — DISCHARGE SUMMARY
[facility]  Office: [address], DO, Hyacinth Fuentes, DO, Otilio Caicedo, DO, Zac Colvin Blood, DO, Will Crum MD, Roberto Walsh MD, Amilcar Carias MD, Lis Sher MD, Michelle Painter MD, Nissa Baez MD, Shira Phillips MD, Elysia Ayon, DO, Andra Cabrera MD, Leslie Mccormick, DO, Elizabeth Kumar MD,  Everardo He DO, Leslye Dinh MD, Paula Srinivasan MD, Melvern Essex, MD, Ward Stcay MD, Shelli Fischer MD, Dari Brown MD, Ronald Liao MD, Soumya Taveras, [facility], [facility], CNP, Luis Carlos Maria, CNP, Alex Oshea, CNS, Ronny Edgar, CNP, Brad Quach, CNP, Jayesh Gallegos, CNP, Barrett Sethi, CNP, Thalia Babin, CNP, Nae Geiger PA-C, Cornell Lema, [facility], Johny Alcantar, CNP, Delon Triana, CNP, Dora Coreas, CNP, Ramila Levy, CNP, Aundrea Summers, CNP, Tigist Jones, CNP, Dana Rodriguez, CNP, Kolton Stringer, [facility]    Discharge Summary     Patient ID: Rigo Wagner  :  1960   MRN: [id number]     ACCOUNT:  [de-identified]   Patient's PCP: Emeka Franco Date: 8/10/2021   Discharge Date: 2021   Length of Stay: 2  Code Status:  Prior  Admitting Physician: No admitting provider for patient encounter. Discharge Physician: Nissa Baez MD     Active Discharge Diagnoses:     Hospital Problem Lists:  Principal Problem:    Partial small bowel obstruction [facility])  Active Problems:    Essential hypertension    Uncontrolled type 2 diabetes mellitus with hyperglycemia, with long-term current use of insulin (HCC)    MIKY (obstructive sleep apnea)  Resolved Problems:    * No resolved hospital problems. *      Admission Condition:  fair     Discharged Condition: stable    Hospital Stay:     Hospital Course: The patient is a 57 y.o.  F who came to emergency room with acute onset abdominal pain associated with nausea, vomiting.  Patient had pain for 24 hours prior to presentation. Christy Latif reported multiple episodes of vomiting and loose stools in the colostomy bag.  She reports history of small bowel obstruction before due to colonic stricture.  She had multiple surgeries.  Patient denies any fever, blood in stool.  Other comorbidities include GERD, hypertension, hyperlipidemia, rectovaginal fistula, type 2 diabetes mellitus.  Evaluation in emergency room showed normal kidney function, leukocytosis 25.0, EKG showed sinus tachycardia.  CT abdomen showed left lower quadrant colostomy with parastomal hernia dilated with small bowel loops in hernia sac. Surgery consulted, NG placed, symptoms improved.  Tolerating PO on d/c.      Significant therapeutic interventions: as above     Significant Diagnostic Studies:   Labs / Micro:  CBC:   Lab Results   Component Value Date    WBC 11.3 08/12/2021    RBC 3.99 08/12/2021    HGB 11.1 08/12/2021    HCT 36.0 08/12/2021    MCV 90.2 08/12/2021    MCH 27.8 08/12/2021    MCHC 30.8 08/12/2021    RDW 12.9 08/12/2021     08/12/2021     BMP:    Lab Results   Component Value Date    GLUCOSE 95 08/12/2021     08/12/2021    K 3.6 08/12/2021     08/12/2021    CO2 20 08/12/2021    ANIONGAP 10 08/12/2021    BUN 4 08/12/2021    CREATININE 0.45 08/12/2021    BUNCRER NOT REPORTED 08/12/2021    CALCIUM 8.0 08/12/2021    LABGLOM >60 08/12/2021    GFRAA >60 08/12/2021    GFR      08/12/2021    GFR NOT REPORTED 08/12/2021     HFP:    Lab Results   Component Value Date    PROT 6.0 08/12/2021     CMP:    Lab Results   Component Value Date    GLUCOSE 95 08/12/2021     08/12/2021    K 3.6 08/12/2021     08/12/2021    CO2 20 08/12/2021    BUN 4 08/12/2021    CREATININE 0.45 08/12/2021    ANIONGAP 10 08/12/2021    ALKPHOS 134 08/12/2021    ALT 7 08/12/2021    AST 8 08/12/2021    BILITOT 0.29 08/12/2021    LABALBU 2.8 08/12/2021    ALBUMIN 0.9 08/12/2021    LABGLOM >60 08/12/2021    GFRAA >60 08/12/2021    GFR      08/12/2021    GFR NOT REPORTED 08/12/2021 PROT 6.0 08/12/2021    CALCIUM 8.0 08/12/2021     PT/INR:    Lab Results   Component Value Date    PROTIME 10.8 12/23/2016    PROTIME 13.4 05/19/2015    INR 1.0 12/23/2016     PTT: No results found for: APTT  FLP:    Lab Results   Component Value Date    CHOL 98 11/19/2019    TRIG 309 11/19/2019    HDL 30 11/19/2019     U/A:    Lab Results   Component Value Date    COLORU YELLOW 02/15/2018    TURBIDITY CLOUDY 02/15/2018    SPECGRAV 1.025 02/15/2018    HGBUR NEGATIVE 02/15/2018    PHUR 6.0 02/15/2018    PROTEINU TRACE 02/15/2018    GLUCOSEU 3+ 02/15/2018    KETUA NEGATIVE 02/15/2018    BILIRUBINUR NEGATIVE 02/15/2018    BILIRUBINUR small 05/01/2014    UROBILINOGEN Normal 02/15/2018    NITRU NEGATIVE 02/15/2018    LEUKOCYTESUR NEGATIVE 02/15/2018     TSH:    Lab Results   Component Value Date    TSH 2.72 03/21/2019        Radiology:  XR ABDOMEN (KUB) (SINGLE AP VIEW)    Result Date: 8/12/2021  Nonspecific bowel gas pattern without convincing evidence for obstruction. XR ABDOMEN (KUB) (SINGLE AP VIEW)    Result Date: 8/12/2021  1. Diffuse central abdominal wall small-bowel dilatation consistent with persisting distal small bowel obstruction versus ileus. 2. Appropriate radiographic positioning nasogastric tube. CT ABDOMEN PELVIS W IV CONTRAST Additional Contrast? None    Result Date: 8/10/2021  Status post left hemicolectomy with Donna's pouch noted and left lower quadrant colostomy with associated parastomal hernia. Fluid-filled dilated loops of small bowel seen within the hernia sac as well as mild amount of surrounding fluid most likely reflecting a partial small bowel obstruction. Cholelithiasis Mildly fatty liver RECOMMENDATIONS: Surgical consultation     XR ABDOMEN FOR NG/OG/NE TUBE PLACEMENT    Result Date: 8/11/2021  Appropriate radiographic positioning of nasogastric tube. Recommend clinical correlation prior to use.        Consultations:    Consults:     Final Specialist Recommendations/Findings: IP CONSULT TO GENERAL SURGERY  IP CONSULT TO INTERNAL MEDICINE  IP CONSULT TO HOSPITALIST      The patient was seen and examined on day of discharge and this discharge summary is in conjunction with any daily progress note from day of discharge. Discharge plan:     Disposition: Home    Physician Follow Up:     Danielle Mcmullen MD  [address] [facility] [address]  [phone]    Go on 8/31/2021  at 2 pm    Kai Jordan MD  [address]  [address] [id number] [phone]    Go on 8/17/2021  at 2:05 pm       Requiring Further Evaluation/Follow Up POST HOSPITALIZATION/Incidental Findings:     Diet: regular diet    Activity: As tolerated    Instructions to Patient: follow up with surgeon     Discharge Medications:      Medication List      START taking these medications    ondansetron 4 MG disintegrating tablet  Commonly known as: ZOFRAN-ODT  Take 1 tablet by mouth every 8 hours as needed for Nausea or Vomiting     oxyCODONE 5 MG immediate release tablet  Commonly known as: ROXICODONE  Take 1 tablet by mouth every 6 hours as needed for Pain for up to 3 days.         CONTINUE taking these medications    acetaminophen 325 MG tablet  Commonly known as: TYLENOL  TAKE ONE TABLET BY MOUTH EVERY SIX HOURS AS NEEDED FOR PAIN     aspirin 81 MG EC tablet  Commonly known as: ASPIRIN LOW DOSE  TAKE ONE TABLET BY MOUTH DAILY     atorvastatin 80 MG tablet  Commonly known as: LIPITOR  TAKE ONE TABLET BY MOUTH DAILY     blood glucose test strips strip  Commonly known as: True Metrix Blood Glucose Test  TEST BLOOD SUGAR DAILY AS DIRECTED     carvedilol 3.125 MG tablet  Commonly known as: COREG  TAKE ONE TABLET BY MOUTH TWICE DAILY     clotrimazole 1 % cream  Commonly known as: LOTRIMIN  APPLY TO AFFECTED AREA TWICE DAILY     dicyclomine 20 MG tablet  Commonly known as: BENTYL  TAKE ONE TABLET BY MOUTH THREE TIMES DAILY AS NEEDED FOR ABDOMINAL PAIN     diphenhydrAMINE 25 MG tablet  Commonly known as: GNP Allergy  Take 1 tablet by mouth every 8 hours as needed for Itching     famotidine 20 MG tablet  Commonly known as: PEPCID  TAKE ONE TABLET BY MOUTH TWICE DAILY     glipiZIDE 5 MG tablet  Commonly known as: GLUCOTROL  TAKE ONE TABLET BY MOUTH TWICE DAILY     ibuprofen 400 MG tablet  Commonly known as: ADVIL;MOTRIN  TAKE ONE TABLET BY MOUTH EVERY SIX HOURS AS NEEDED FOR PAIN     Incontinence Supplies Misc  Use as needed for incontinence     insulin lispro (1 Unit Dial) 100 UNIT/ML Sopn  INJECT 25 UNITS SUBCUTANEOUSLY THREE TIMES DAILY BEFORE MEALS     ipratropium-albuterol 0.5-2.5 (3) MG/3ML Soln nebulizer solution  Commonly known as: DUONEB  Inhale 3 mLs into the lungs 4 times daily as needed for Shortness of Breath     Lantus SoloStar 100 UNIT/ML injection pen  Generic drug: insulin glargine  INJECT 100 UNITS UNDER THE SKIN NIGHTLY     metFORMIN 1000 MG tablet  Commonly known as: GLUCOPHAGE  TAKE ONE TABLET BY MOUTH TWICE DAILY WITH MEALS     nicotine 21 MG/24HR  Commonly known as: NICODERM CQ  Place 1 patch onto the skin every 24 hours     nystatin 384356 UNIT/GM cream  Commonly known as: MYCOSTATIN  Apply topically 2 times daily. raNITIdine 150 MG tablet  Commonly known as: ZANTAC  Take 1 tablet by mouth 2 times daily     * Sure Comfort Pen Needles 32G X 4 MM Misc  Generic drug: Insulin Pen Needle  USE AS DIRECTED     * Easy Touch Pen Needles 32G X 6 MM Misc  Generic drug: Insulin Pen Needle  USE AS DIRECTED     triamcinolone 0.025 % cream  Commonly known as: KENALOG  APPLY TO AFFECTED AREA TWICE DAILY     TRUEplus Lancets 33G Misc  USE AS DIRECTED THREE TIMES DAILY     UltiCare Alcohol Swabs 70 % Pads  USE AS DIRECTED         * This list has 2 medication(s) that are the same as other medications prescribed for you. Read the directions carefully, and ask your doctor or other care provider to review them with you.                Where to Get Your Medications      These medications were sent to [place] [id number] [facility], [address] [place] [address]  [address], [address] [address]    Phone: [phone]   · ondansetron 4 MG disintegrating tablet     You can get these medications from any pharmacy    Bring a paper prescription for each of these medications  · oxyCODONE 5 MG immediate release tablet         No discharge procedures on file. Time Spent on discharge is  32 mins in patient examination, evaluation, counseling as well as medication reconciliation, prescriptions for required medications, discharge plan and follow up. Electronically signed by   Marco Dodge MD  8/14/2021  1:22 PM      Thank you Dr. Husam Peters for the opportunity to be involved in this patient's care.

## 2021-08-16 LAB
CULTURE: NORMAL
CULTURE: NORMAL
Lab: NORMAL
Lab: NORMAL
SPECIMEN DESCRIPTION: NORMAL
SPECIMEN DESCRIPTION: NORMAL

## 2021-09-28 ENCOUNTER — HOSPITAL ENCOUNTER (EMERGENCY)
Age: 61
Discharge: HOME | End: 2021-09-28
Attending: EMERGENCY MEDICINE
Payer: COMMERCIAL

## 2021-09-28 VITALS
TEMPERATURE: 97.1 F | SYSTOLIC BLOOD PRESSURE: 141 MMHG | RESPIRATION RATE: 20 BRPM | HEART RATE: 95 BPM | BODY MASS INDEX: 34.37 KG/M2 | WEIGHT: 194 LBS | OXYGEN SATURATION: 99 % | DIASTOLIC BLOOD PRESSURE: 80 MMHG

## 2021-09-28 DIAGNOSIS — R10.33 PERIUMBILICAL ABDOMINAL PAIN: ICD-10-CM

## 2021-09-28 DIAGNOSIS — R10.13 EPIGASTRIC PAIN: Primary | ICD-10-CM

## 2021-09-28 LAB
ABSOLUTE EOS #: 0.62 K/UL (ref 0–0.44)
ABSOLUTE IMMATURE GRANULOCYTE: 0.05 K/UL (ref 0–0.3)
ABSOLUTE LYMPH #: 3.01 K/UL (ref 1.1–3.7)
ABSOLUTE MONO #: 0.65 K/UL (ref 0.1–1.2)
ALBUMIN SERPL-MCNC: 3.9 G/DL (ref 3.5–5.2)
ALBUMIN/GLOBULIN RATIO: 0.9 (ref 1–2.5)
ALP BLD-CCNC: 185 U/L (ref 35–104)
ALT SERPL-CCNC: 11 U/L (ref 5–33)
ANION GAP SERPL CALCULATED.3IONS-SCNC: 14 MMOL/L (ref 9–17)
AST SERPL-CCNC: 13 U/L
BASOPHILS # BLD: 0 % (ref 0–2)
BASOPHILS ABSOLUTE: 0.04 K/UL (ref 0–0.2)
BILIRUB SERPL-MCNC: 0.27 MG/DL (ref 0.3–1.2)
BUN BLDV-MCNC: 9 MG/DL (ref 6–20)
BUN/CREAT BLD: ABNORMAL (ref 9–20)
CALCIUM SERPL-MCNC: 9.5 MG/DL (ref 8.6–10.4)
CHLORIDE BLD-SCNC: 100 MMOL/L (ref 98–107)
CO2: 23 MMOL/L (ref 20–31)
CREAT SERPL-MCNC: 0.51 MG/DL (ref 0.5–0.9)
DIFFERENTIAL TYPE: ABNORMAL
EOSINOPHILS RELATIVE PERCENT: 6 % (ref 1–4)
GFR AFRICAN AMERICAN: >60 ML/MIN
GFR NON-AFRICAN AMERICAN: >60 ML/MIN
GFR SERPL CREATININE-BSD FRML MDRD: ABNORMAL ML/MIN/{1.73_M2}
GFR SERPL CREATININE-BSD FRML MDRD: ABNORMAL ML/MIN/{1.73_M2}
GLUCOSE BLD-MCNC: 121 MG/DL (ref 70–99)
HCT VFR BLD CALC: 43.7 % (ref 36.3–47.1)
HEMOGLOBIN: 13.4 G/DL (ref 11.9–15.1)
IMMATURE GRANULOCYTES: 1 %
LACTIC ACID, WHOLE BLOOD: 1.5 MMOL/L (ref 0.7–2.1)
LACTIC ACID: NORMAL MMOL/L
LIPASE: 9 U/L (ref 13–60)
LYMPHOCYTES # BLD: 28 % (ref 24–43)
MAGNESIUM: 1.9 MG/DL (ref 1.6–2.6)
MCH RBC QN AUTO: 28.5 PG (ref 25.2–33.5)
MCHC RBC AUTO-ENTMCNC: 30.7 G/DL (ref 28.4–34.8)
MCV RBC AUTO: 93 FL (ref 82.6–102.9)
MONOCYTES # BLD: 6 % (ref 3–12)
NRBC AUTOMATED: 0.2 PER 100 WBC
PDW BLD-RTO: 13.2 % (ref 11.8–14.4)
PLATELET # BLD: 320 K/UL (ref 138–453)
PLATELET ESTIMATE: ABNORMAL
PMV BLD AUTO: 11.2 FL (ref 8.1–13.5)
POTASSIUM SERPL-SCNC: 3.4 MMOL/L (ref 3.7–5.3)
RBC # BLD: 4.7 M/UL (ref 3.95–5.11)
RBC # BLD: ABNORMAL 10*6/UL
SEG NEUTROPHILS: 59 % (ref 36–65)
SEGMENTED NEUTROPHILS ABSOLUTE COUNT: 6.55 K/UL (ref 1.5–8.1)
SODIUM BLD-SCNC: 137 MMOL/L (ref 135–144)
TOTAL PROTEIN: 8.3 G/DL (ref 6.4–8.3)
WBC # BLD: 10.9 K/UL (ref 3.5–11.3)
WBC # BLD: ABNORMAL 10*3/UL

## 2021-09-28 PROCEDURE — 6360000002 HC RX W HCPCS: Performed by: STUDENT IN AN ORGANIZED HEALTH CARE EDUCATION/TRAINING PROGRAM

## 2021-09-28 PROCEDURE — 99282 EMERGENCY DEPT VISIT SF MDM: CPT

## 2021-09-28 PROCEDURE — 85025 COMPLETE CBC W/AUTO DIFF WBC: CPT

## 2021-09-28 PROCEDURE — 83690 ASSAY OF LIPASE: CPT

## 2021-09-28 PROCEDURE — 80053 COMPREHEN METABOLIC PANEL: CPT

## 2021-09-28 PROCEDURE — 83735 ASSAY OF MAGNESIUM: CPT

## 2021-09-28 PROCEDURE — 83605 ASSAY OF LACTIC ACID: CPT

## 2021-09-28 PROCEDURE — 96374 THER/PROPH/DIAG INJ IV PUSH: CPT

## 2021-09-28 RX ORDER — OXYCODONE HYDROCHLORIDE 5 MG/1
5 TABLET ORAL EVERY 6 HOURS PRN
Qty: 12 TABLET | Refills: 0 | Status: SHIPPED | OUTPATIENT
Start: 2021-09-28 | End: 2021-09-28 | Stop reason: SDUPTHER

## 2021-09-28 RX ORDER — OXYCODONE HYDROCHLORIDE 5 MG/1
5 TABLET ORAL EVERY 8 HOURS PRN
Qty: 5 TABLET | Refills: 0 | Status: SHIPPED | OUTPATIENT
Start: 2021-09-28 | End: 2021-09-30

## 2021-09-28 RX ORDER — FENTANYL CITRATE 50 UG/ML
50 INJECTION, SOLUTION INTRAMUSCULAR; INTRAVENOUS
Status: DISCONTINUED | OUTPATIENT
Start: 2021-09-28 | End: 2021-09-28 | Stop reason: HOSPADM

## 2021-09-28 RX ORDER — FENTANYL CITRATE 50 UG/ML
25 INJECTION, SOLUTION INTRAMUSCULAR; INTRAVENOUS
Status: DISCONTINUED | OUTPATIENT
Start: 2021-09-28 | End: 2021-09-28

## 2021-09-28 RX ADMIN — FENTANYL CITRATE 50 MCG: 50 INJECTION, SOLUTION INTRAMUSCULAR; INTRAVENOUS at 12:56

## 2021-09-28 ASSESSMENT — ENCOUNTER SYMPTOMS
SORE THROAT: 0
VOMITING: 0
DIARRHEA: 0
RHINORRHEA: 0
NAUSEA: 0
SHORTNESS OF BREATH: 0
ABDOMINAL DISTENTION: 0
ANAL BLEEDING: 0
PHOTOPHOBIA: 0
CONSTIPATION: 0
CHEST TIGHTNESS: 0

## 2021-09-28 ASSESSMENT — PAIN SCALES - GENERAL
PAINLEVEL_OUTOF10: 6
PAINLEVEL_OUTOF10: 10

## 2021-09-28 NOTE — H&P
[facility] ED  Emergency Department Encounter  EmergencyMedicine Resident     Pt Michelle Garcia  MRN: [id number]  [place] 1960  Date of evaluation: 9/28/21  PCP:  Marty Jansen    This patient was evaluated in the Emergency Department for symptoms described in the history of present illness. The patient was evaluated in the context of the global COVID-19 pandemic, which necessitated consideration that the patient might be at risk for infection with the SARS-CoV-2 virus that causes COVID-19. Institutional protocols and algorithms that pertain to the evaluation of patients at risk for COVID-19 are in a state of rapid change based on information released by regulatory bodies including the CDC and federal and state organizations. These policies and algorithms were followed during the patient's care in the ED. CHIEF COMPLAINT       Chief Complaint   Patient presents with    Abdominal Pain     pain started last night, pt states she is having surgery 10/20 for twisted intestines, hx of hernias       HISTORY OF PRESENT ILLNESS  (Location/Symptom, Timing/Onset, Context/Setting, Quality, Duration, Modifying Factors, Severity.)      Nash Ace is a 62 y.o. female pmh diverticulitis, PE, htn, sbo, gm2, h recently abdmitted 8/10 for abdominal pain. Patient found to have parastomal hernia with parts of small bowel within hernia. Patient also found to have partial SBO. Condition improved with NGT and bowel rest. Patient is scheduled for surgical intervention on 10/20/21 by Dr. Eladio Gallegos general surgeon. Patient states her pain has been constant since discharge and it has not acutely worsened. Patient is passing gas, having regular bowel movements, and tolerating PO intake well. Patient states ran out of pain medication recently and has not had adequate pain relief at home.  She states she does not want to be admitted and would like to go home    PAST MEDICAL / SURGICAL / SOCIAL / FAMILY HISTORY has a past medical history of Allergic rhinitis, Arthritis, Chronic back pain, Diverticulitis, GERD (gastroesophageal reflux disease), Headache(784.0), Hyperlipidemia, Hypertension, Knee pain, MDRO (multiple drug resistant organisms) resistance, Narcotic abuse ([facility] [address] Obesity, Osteoarthritis, Rectovaginal fistula, Type II or unspecified type diabetes mellitus without mention of complication, not stated as uncontrolled, and Urinary incontinence. has a past surgical history that includes Knee Arthroplasty; Abdomen surgery (5/1/16); ventral hernia repair (308233); Abdomen surgery (01/21/2014); and Abdominal exploration surgery (01/02/2017). Social History     Socioeconomic History    Marital status: Single     Spouse name: Not on file    Number of children: Not on file    Years of education: Not on file    Highest education level: Not on file   Occupational History    Not on file   Tobacco Use    Smoking status: Current Every Day Smoker     Packs/day: 1.00     Years: 30.00     Pack years: 30.00     Types: Cigarettes     Start date: 10/24/1989    Smokeless tobacco: Never Used    Tobacco comment: 1 cigarette/day   Substance and Sexual Activity    Alcohol use: No     Alcohol/week: 0.0 standard drinks    Drug use: No    Sexual activity: Never   Other Topics Concern    Not on file   Social History Narrative    Not on file     Social Determinants of Health     Financial Resource Strain:     Difficulty of Paying Living Expenses:    Food Insecurity:     Worried About Running Out of Food in the Last Year:     Ran Out of Food in the Last Year:    Transportation Needs:     Lack of Transportation (Medical):      Lack of Transportation (Non-Medical):    Physical Activity:     Days of Exercise per Week:     Minutes of Exercise per Session:    Stress:     Feeling of Stress :    Social Connections:     Frequency of Communication with Friends and Family:     Frequency of Social Gatherings with Friends and Family:     Attends [employer] Services:     Active Member of Clubs or Organizations:     Attends Club or Organization Meetings:     Marital Status:    Intimate Partner Violence:     Fear of Current or Ex-Partner:     Emotionally Abused:     Physically Abused:     Sexually Abused:        Family History   Problem Relation Age of Onset    Diabetes Mother     Cancer Father         COLON, PROSTATE    Heart Disease Paternal Aunt        Allergies:  Lisinopril, Ace inhibitors, Ciprofloxacin, Metaxalone, Other, and Penicillins    Home Medications:  Prior to Admission medications    Medication Sig Start Date End Date Taking?  Authorizing Provider   famotidine (PEPCID) 20 MG tablet TAKE ONE TABLET BY MOUTH TWICE DAILY 5/1/20   Lawanda Corbin MD   clotrimazole (LOTRIMIN) 1 % cream APPLY TO AFFECTED AREA TWICE DAILY 5/1/20   Lawanda Corbin MD   diphenhydrAMINE (GNP ALLERGY) 25 MG tablet Take 1 tablet by mouth every 8 hours as needed for Itching 4/3/20   Lawanda Corbin MD   glipiZIDE (GLUCOTROL) 5 MG tablet TAKE ONE TABLET BY MOUTH TWICE DAILY 4/3/20   Lawanda Corbin MD   carvedilol (COREG) 3.125 MG tablet TAKE ONE TABLET BY MOUTH TWICE DAILY 4/3/20   Lawanda Corbin MD   LANTUS SOLOSTAR 100 UNIT/ML injection pen INJECT 100 UNITS UNDER THE SKIN NIGHTLY 4/3/20   Lawanda Corbin MD   EASY TOUCH PEN NEEDLES 32G X 6 MM MISC USE AS DIRECTED 4/3/20   Lawanda Corbin MD   atorvastatin (LIPITOR) 80 MG tablet TAKE ONE TABLET BY MOUTH DAILY 4/3/20   Lawanda Corbin MD   raNITIdine (ZANTAC) 150 MG tablet Take 1 tablet by mouth 2 times daily 3/13/20   Lawanda Corbin MD   acetaminophen (TYLENOL) 325 MG tablet TAKE ONE TABLET BY MOUTH EVERY SIX HOURS AS NEEDED FOR PAIN 3/11/20 4/10/20  Lawanda Corbin MD   ibuprofen (ADVIL;MOTRIN) 400 MG tablet TAKE ONE TABLET BY MOUTH EVERY SIX HOURS AS NEEDED FOR PAIN 3/11/20 4/10/20  Lawanda Corbin MD   dicyclomine (BENTYL) 20 MG tablet TAKE ONE TABLET BY MOUTH THREE TIMES DAILY AS NEEDED FOR ABDOMINAL PAIN 3/11/20 4/10/20 Darius Chandra MD   triamcinolone (KENALOG) 0.025 % cream APPLY TO AFFECTED AREA TWICE DAILY 3/11/20   Darius Chandra MD   insulin lispro, 1 Unit Dial, 100 UNIT/ML SOPN INJECT 25 UNITS SUBCUTANEOUSLY THREE TIMES DAILY BEFORE MEALS 3/11/20 4/10/20  Darius Chandra MD   ULTICARE ALCOHOL SWABS 70 % PADS USE AS DIRECTED 1/15/20   Darius Chandra MD   nystatin (MYCOSTATIN) 804555 UNIT/GM cream Apply topically 2 times daily. 10/24/19   Darius Chandra MD   aspirin (ASPIRIN LOW DOSE) 81 MG EC tablet TAKE ONE TABLET BY MOUTH DAILY 10/24/19   Darius Chandra MD   Incontinence Supplies MISC Use as needed for incontinence 10/24/19   Darius Chandra MD   ipratropium-albuterol (DUONEB) 0.5-2.5 (3) MG/3ML SOLN nebulizer solution Inhale 3 mLs into the lungs 4 times daily as needed for Shortness of Breath 10/24/19   Darius Chandra MD   nicotine (NICODERM CQ) 21 MG/24HR Place 1 patch onto the skin every 24 hours 10/24/19 10/23/20  Darius Chandra MD   TRUEPLUS LANCETS 33G MISC USE AS DIRECTED THREE TIMES DAILY 10/24/19   Darius Chandra MD   blood glucose test strips (TRUE METRIX BLOOD GLUCOSE TEST) strip TEST BLOOD SUGAR DAILY AS DIRECTED 10/24/19   Darius Chandra MD   SURE COMFORT PEN NEEDLES 32G X 4 MM MISC USE AS DIRECTED 7/11/19   Darius Chandra MD   metFORMIN (GLUCOPHAGE) 1000 MG tablet TAKE ONE TABLET BY MOUTH TWICE DAILY WITH MEALS 5/1/18   Dilip Asif MD       REVIEW OF SYSTEMS    (2-9 systems for level 4, 10 or more for level 5)      Review of Systems   Constitutional: Negative for chills and fever. HENT: Negative for rhinorrhea and sore throat. Eyes: Negative for photophobia. Respiratory: Negative for chest tightness and shortness of breath. Cardiovascular: Negative for chest pain. Gastrointestinal: Negative for abdominal distention, anal bleeding, constipation, diarrhea, nausea and vomiting.        +Epigastric and periumbilical pain   Endocrine: Negative for polyuria. Genitourinary: Negative for difficulty urinating and flank pain. Musculoskeletal: Negative for arthralgias. Skin: Negative for rash. Neurological: Negative for weakness and headaches. PHYSICAL EXAM   (up to 7 for level 4, 8 or more for level 5)      INITIAL VITALS:   BP (!) 141/80   Pulse 95   Temp 97.1 °F (36.2 °C) (Oral)   Resp 20   Wt 194 lb (88 kg)   SpO2 99%   BMI 34.37 kg/m²     Physical Exam  Constitutional:       General: She is not in acute distress. Appearance: She is not ill-appearing. HENT:      Head: Normocephalic. Mouth/Throat:      Mouth: Mucous membranes are moist.      Pharynx: Oropharynx is clear. Eyes:      Pupils: Pupils are equal, round, and reactive to light. Cardiovascular:      Rate and Rhythm: Normal rate. Heart sounds: No murmur heard. No friction rub. No gallop. Pulmonary:      Breath sounds: No wheezing, rhonchi or rales. Abdominal:      General: Bowel sounds are normal.      Palpations: Abdomen is soft. There is no fluid wave, hepatomegaly or splenomegaly. Tenderness: There is abdominal tenderness in the epigastric area and periumbilical area. There is no guarding or rebound. Negative signs include De La Cruz's sign. Musculoskeletal:         General: No tenderness. Cervical back: No tenderness. Right lower leg: No edema. Left lower leg: No edema. Skin:     Coloration: Skin is not jaundiced. Findings: No rash. Neurological:      General: No focal deficit present. Mental Status: She is oriented to person, place, and time.          DIFFERENTIAL  DIAGNOSIS     PLAN (LABS / IMAGING / EKG):  Orders Placed This Encounter   Procedures    CBC Auto Differential    Comprehensive Metabolic Panel w/ Reflex to MG    Lipase    Lactic Acid, Plasma    Magnesium       MEDICATIONS ORDERED:  Orders Placed This Encounter   Medications    DISCONTD: fentaNYL (SUBLIMAZE) injection 25 mcg    fentaNYL (SUBLIMAZE) injection 50 mcg       DDX:     Chronic hernia - patient recently admitted and found to have parastomal hernia, patient scheduled to have hernia repair on 10/20    Mesenteric artery thrombosis - patients physcial not consistent with this, will get lactic acid    SBO - patient is having bowel movements and passing stool making this unlikley    DIAGNOSTIC RESULTS / EMERGENCY DEPARTMENT COURSE / MDM   LAB RESULTS:  Results for orders placed or performed during the hospital encounter of 09/28/21   CBC Auto Differential   Result Value Ref Range    WBC 10.9 3.5 - 11.3 k/uL    RBC 4.70 3.95 - 5.11 m/uL    Hemoglobin 13.4 11.9 - 15.1 g/dL    Hematocrit 43.7 36.3 - 47.1 %    MCV 93.0 82.6 - 102.9 fL    MCH 28.5 25.2 - 33.5 pg    MCHC 30.7 28.4 - 34.8 g/dL    RDW 13.2 11.8 - 14.4 %    Platelets 962 698 - 026 k/uL    MPV 11.2 8.1 - 13.5 fL    NRBC Automated 0.2 (H) 0.0 per 100 WBC    Differential Type NOT REPORTED     Seg Neutrophils 59 36 - 65 %    Lymphocytes 28 24 - 43 %    Monocytes 6 3 - 12 %    Eosinophils % 6 (H) 1 - 4 %    Basophils 0 0 - 2 %    Immature Granulocytes 1 (H) 0 %    Segs Absolute 6.55 1.50 - 8.10 k/uL    Absolute Lymph # 3.01 1.10 - 3.70 k/uL    Absolute Mono # 0.65 0.10 - 1.20 k/uL    Absolute Eos # 0.62 (H) 0.00 - 0.44 k/uL    Basophils Absolute 0.04 0.00 - 0.20 k/uL    Absolute Immature Granulocyte 0.05 0.00 - 0.30 k/uL    WBC Morphology NOT REPORTED     RBC Morphology NOT REPORTED     Platelet Estimate NOT REPORTED    Comprehensive Metabolic Panel w/ Reflex to MG   Result Value Ref Range    Glucose 121 (H) 70 - 99 mg/dL    BUN 9 6 - 20 mg/dL    CREATININE 0.51 0.50 - 0.90 mg/dL    Bun/Cre Ratio NOT REPORTED 9 - 20    Calcium 9.5 8.6 - 10.4 mg/dL    Sodium 137 135 - 144 mmol/L    Potassium 3.4 (L) 3.7 - 5.3 mmol/L    Chloride 100 98 - 107 mmol/L    CO2 23 20 - 31 mmol/L    Anion Gap 14 9 - 17 mmol/L    Alkaline Phosphatase 185 (H) 35 - 104 U/L    ALT 11 5 - 33 U/L    AST 13 <32 U/L    Total Bilirubin 0.27 (L) 0.3 - 1.2 mg/dL    Total Protein 8.3 6.4 - 8.3 g/dL    Albumin 3.9 3.5 - 5.2 g/dL    Albumin/Globulin Ratio 0.9 (L) 1.0 - 2.5    GFR Non-African American >60 >60 mL/min    GFR African American >60 >60 mL/min    GFR Comment          GFR Staging NOT REPORTED    Lipase   Result Value Ref Range    Lipase 9 (L) 13 - 60 U/L   Lactic Acid, Plasma   Result Value Ref Range    Lactic Acid NOT REPORTED mmol/L    Lactic Acid, Whole Blood 1.5 0.7 - 2.1 mmol/L   Magnesium   Result Value Ref Range    Magnesium 1.9 1.6 - 2.6 mg/dL       IMPRESSION: No acute process    RADIOLOGY:  None    EKG  None    All EKG's are interpreted by the Emergency Department Physician who either signs or Co-signs this chart in the absence of a cardiologist.    EMERGENCY DEPARTMENT COURSE:  ED Course as of Sep 28 1358 Tue Sep 28, 2021   1258 Patient evaluated, history/physical exam not concerning for peritoneal signs or acute process. Will get labs and provide pain control. No indication for imagining at this time    [ZE]   1318 Patient reevaluated and abdominal pain has improved. Patients labs unremarkable for acute process     [ZE]      ED Course User Index  [ZE] Melania Uriostegui, DO         PROCEDURES:  None    CONSULTS:  None    CRITICAL CARE:  None    FINAL IMPRESSION      No diagnosis found. DISPOSITION / PLAN     DISPOSITION  - discharge to home      PATIENT REFERRED TO:  No follow-up provider specified.     DISCHARGE MEDICATIONS:  New Prescriptions    No medications on file       Nichols Screen, DO  Emergency Medicine Resident    (Please note that portions of thisnote were completed with a voice recognition program.  Efforts were made to edit the dictations but occasionally words are mis-transcribed.)

## 2021-09-28 NOTE — ED NOTES
Pt states she is moving her bowels and knows she has hernias and \"twisted bowel. \"     She has an operation scheduled for October 10th. She reports just needing pain control till then.       Jose Raul Almanza RN  09/28/21 1308

## 2021-09-28 NOTE — ED PROVIDER NOTES
[facility] ED  eMERGENCY dEPARTMENT eNCOUnter   Attending Attestation     Pt Name: Grayson Farnsworth  MRN: [id number]  [place] 1960  Date of evaluation: 9/28/21       Grayson Farnsworth is a 62 y.o. female who presents with Abdominal Pain (pain started last night, pt states she is having surgery 10/20 for twisted intestines, hx of hernias)      History: Pt presents with Abdominal pain. Pt is scheduled for surgery. Exam: HRRR, lungs CTABL, abdomen soft with tenderness over lower abdomen. Multiple scars noted and ostomy noted as well. Plan for labs and pain control. Insists that the symptoms are chronic and constant and that she just cannot take it anymore. Patient does not think anything new was going on today. If labs are negative will plan for discharge. Consider surgery consult if patient does not improve or if there are findings on blood work that are concerning. I performed a history and physical examination of the patient and discussed management with the resident. I reviewed the residents note and agree with the documented findings and plan of care. Any areas of disagreement are noted on the chart. I was personally present for the key portions of any procedures. I have documented in the chart those procedures where I was not present during the key portions. I have personally reviewed all images and agree with the resident's interpretation. I have reviewed the emergency nurses triage note. I agree with the chief complaint, past medical history, past surgical history, allergies, medications, social and family history as documented unless otherwise noted below. Documentation of the HPI, Physical Exam and Medical Decision Making performed by medical students or scribes is based on my personal performance of the HPI, PE and MDM.  For Phys Assistant/ Nurse Practitioner cases/documentation I have had a face to face evaluation of this patient and have completed at least one if not all key elements of the E/M (history, physical exam, and MDM). Additional findings are as noted. For APC cases I have personally evaluated and examined the patient in conjunction with the APC and agree with the treatment plan and disposition of the patient as recorded by the APC.     Tawana Barajas MD  Attending Emergency  Physician       Moris Hopkins MD  09/28/21 6345

## 2021-09-30 NOTE — ED PROVIDER NOTES
has a past medical history of Allergic rhinitis, Arthritis, Chronic back pain, Diverticulitis, GERD (gastroesophageal reflux disease), Headache(784.0), Hyperlipidemia, Hypertension, Knee pain, MDRO (multiple drug resistant organisms) resistance, Narcotic abuse ([facility] [address] Obesity, Osteoarthritis, Rectovaginal fistula, Type II or unspecified type diabetes mellitus without mention of complication, not stated as uncontrolled, and Urinary incontinence. has a past surgical history that includes Knee Arthroplasty; Abdomen surgery (5/1/16); ventral hernia repair (452946); Abdomen surgery (01/21/2014); and Abdominal exploration surgery (01/02/2017). Social History     Socioeconomic History    Marital status: Single     Spouse name: Not on file    Number of children: Not on file    Years of education: Not on file    Highest education level: Not on file   Occupational History    Not on file   Tobacco Use    Smoking status: Current Every Day Smoker     Packs/day: 1.00     Years: 30.00     Pack years: 30.00     Types: Cigarettes     Start date: 10/24/1989    Smokeless tobacco: Never Used    Tobacco comment: 1 cigarette/day   Substance and Sexual Activity    Alcohol use: No     Alcohol/week: 0.0 standard drinks    Drug use: No    Sexual activity: Never   Other Topics Concern    Not on file   Social History Narrative    Not on file     Social Determinants of Health     Financial Resource Strain:     Difficulty of Paying Living Expenses:    Food Insecurity:     Worried About Running Out of Food in the Last Year:     Ran Out of Food in the Last Year:    Transportation Needs:     Lack of Transportation (Medical):      Lack of Transportation (Non-Medical):    Physical Activity:     Days of Exercise per Week:     Minutes of Exercise per Session:    Stress:     Feeling of Stress :    Social Connections:     Frequency of Communication with Friends and Family:     Frequency of Social Gatherings with Friends and Family:     Attends [employer] Services:     Active Member of Clubs or Organizations:     Attends Club or Organization Meetings:     Marital Status:    Intimate Partner Violence:     Fear of Current or Ex-Partner:     Emotionally Abused:     Physically Abused:     Sexually Abused:        Family History   Problem Relation Age of Onset    Diabetes Mother     Cancer Father         COLON, PROSTATE    Heart Disease Paternal Aunt        Allergies:  Lisinopril, Ace inhibitors, Ciprofloxacin, Metaxalone, Other, and Penicillins    Home Medications:  Prior to Admission medications    Medication Sig Start Date End Date Taking? Authorizing Provider   oxyCODONE (ROXICODONE) 5 MG immediate release tablet Take 1 tablet by mouth every 8 hours as needed for Pain for up to 5 doses. Intended supply: 3 days.  Take lowest dose possible to manage pain 9/28/21 9/30/21 Yes Lulu Blanco DO   famotidine (PEPCID) 20 MG tablet TAKE ONE TABLET BY MOUTH TWICE DAILY 5/1/20   Rosales Manzo MD   clotrimazole (LOTRIMIN) 1 % cream APPLY TO AFFECTED AREA TWICE DAILY 5/1/20   Rosales Manzo MD   diphenhydrAMINE (GNP ALLERGY) 25 MG tablet Take 1 tablet by mouth every 8 hours as needed for Itching 4/3/20   Rosales Manzo MD   glipiZIDE (GLUCOTROL) 5 MG tablet TAKE ONE TABLET BY MOUTH TWICE DAILY 4/3/20   Rosales Manzo MD   carvedilol (COREG) 3.125 MG tablet TAKE ONE TABLET BY MOUTH TWICE DAILY 4/3/20   MD CARMENCITA Mata SOLOSTAR 100 UNIT/ML injection pen INJECT 100 UNITS UNDER THE SKIN NIGHTLY 4/3/20   Rosales Manzo MD   EASY TOUCH PEN NEEDLES 32G X 6 MM MISC USE AS DIRECTED 4/3/20   Rosales Manzo MD   atorvastatin (LIPITOR) 80 MG tablet TAKE ONE TABLET BY MOUTH DAILY 4/3/20   Rosales Manzo MD   raNITIdine (ZANTAC) 150 MG tablet Take 1 tablet by mouth 2 times daily 3/13/20   Rosales Manzo MD   acetaminophen (TYLENOL) 325 MG tablet TAKE ONE TABLET BY MOUTH EVERY SIX HOURS AS NEEDED FOR PAIN 3/11/20 4/10/20  Rosales Manzo MD   ibuprofen (ADVIL;MOTRIN) 400 MG tablet TAKE ONE TABLET BY MOUTH EVERY SIX HOURS AS NEEDED FOR PAIN 3/11/20 4/10/20  Guy Mccray MD   dicyclomine (BENTYL) 20 MG tablet TAKE ONE TABLET BY MOUTH THREE TIMES DAILY AS NEEDED FOR ABDOMINAL PAIN 3/11/20 4/10/20  Guy Mccray MD   triamcinolone (KENALOG) 0.025 % cream APPLY TO AFFECTED AREA TWICE DAILY 3/11/20   Guy Mccray MD   insulin lispro, 1 Unit Dial, 100 UNIT/ML SOPN INJECT 25 UNITS SUBCUTANEOUSLY THREE TIMES DAILY BEFORE MEALS 3/11/20 4/10/20  Guy Mccray MD   ULTICARE ALCOHOL SWABS 70 % PADS USE AS DIRECTED 1/15/20   Guy Mccray MD   nystatin (MYCOSTATIN) 157924 UNIT/GM cream Apply topically 2 times daily. 10/24/19   Guy Mccray MD   aspirin (ASPIRIN LOW DOSE) 81 MG EC tablet TAKE ONE TABLET BY MOUTH DAILY 10/24/19   Guy Mccray MD   Incontinence Supplies MISC Use as needed for incontinence 10/24/19   Guy Mccray MD   ipratropium-albuterol (DUONEB) 0.5-2.5 (3) MG/3ML SOLN nebulizer solution Inhale 3 mLs into the lungs 4 times daily as needed for Shortness of Breath 10/24/19   Guy Mccray MD   nicotine (NICODERM CQ) 21 MG/24HR Place 1 patch onto the skin every 24 hours 10/24/19 10/23/20  Guy Mccray MD   TRUEPLUS LANCETS 33G MISC USE AS DIRECTED THREE TIMES DAILY 10/24/19   Guy Mccray MD   blood glucose test strips (TRUE METRIX BLOOD GLUCOSE TEST) strip TEST BLOOD SUGAR DAILY AS DIRECTED 10/24/19   Guy Mccray MD   SURE COMFORT PEN NEEDLES 32G X 4 MM MISC USE AS DIRECTED 7/11/19   Guy Mccray MD   metFORMIN (GLUCOPHAGE) 1000 MG tablet TAKE ONE TABLET BY MOUTH TWICE DAILY WITH MEALS 5/1/18   Dilip Asif MD       REVIEW OF SYSTEMS    (2-9 systems for level 4, 10 or more for level 5)      Review of Systems   Constitutional: Negative for chills and fever. HENT: Negative for rhinorrhea and sore throat. Eyes: Negative for photophobia. Respiratory: Negative for chest tightness and shortness of breath. Cardiovascular: Negative for chest pain.    Gastrointestinal: Negative for abdominal distention, anal bleeding, constipation, diarrhea, nausea and vomiting.        +Epigastric and periumbilical pain   Endocrine: Negative for polyuria. Genitourinary: Negative for difficulty urinating and flank pain. Musculoskeletal: Negative for arthralgias. Skin: Negative for rash. Neurological: Negative for weakness and headaches. PHYSICAL EXAM   (up to 7 for level 4, 8 or more for level 5)      INITIAL VITALS:   BP (!) 141/80   Pulse 95   Temp 97.1 °F (36.2 °C) (Oral)   Resp 20   Wt 194 lb (88 kg)   SpO2 99%   BMI 34.37 kg/m²     Physical Exam  Constitutional:       General: She is not in acute distress. Appearance: She is not ill-appearing. HENT:      Head: Normocephalic. Mouth/Throat:      Mouth: Mucous membranes are moist.      Pharynx: Oropharynx is clear. Eyes:      Pupils: Pupils are equal, round, and reactive to light. Cardiovascular:      Rate and Rhythm: Normal rate. Heart sounds: No murmur heard. No friction rub. No gallop. Pulmonary:      Breath sounds: No wheezing, rhonchi or rales. Abdominal:      General: Bowel sounds are normal.      Palpations: Abdomen is soft. There is no fluid wave, hepatomegaly or splenomegaly. Tenderness: There is abdominal tenderness in the epigastric area and periumbilical area. There is no guarding or rebound. Negative signs include De La Cruz's sign. Musculoskeletal:         General: No tenderness. Cervical back: No tenderness. Right lower leg: No edema. Left lower leg: No edema. Skin:     Coloration: Skin is not jaundiced. Findings: No rash. Neurological:      General: No focal deficit present. Mental Status: She is oriented to person, place, and time.          DIFFERENTIAL  DIAGNOSIS     PLAN (LABS / IMAGING / EKG):  Orders Placed This Encounter   Procedures    CBC Auto Differential    Comprehensive Metabolic Panel w/ Reflex to MG    Lipase    Lactic Acid, Plasma    Magnesium       MEDICATIONS ORDERED:  Orders Placed This Encounter   Medications    DISCONTD: fentaNYL (SUBLIMAZE) injection 25 mcg    DISCONTD: fentaNYL (SUBLIMAZE) injection 50 mcg    DISCONTD: oxyCODONE (ROXICODONE) 5 MG immediate release tablet     Sig: Take 1 tablet by mouth every 6 hours as needed for Pain for up to 3 days. Intended supply: 3 days. Take lowest dose possible to manage pain     Dispense:  12 tablet     Refill:  0    oxyCODONE (ROXICODONE) 5 MG immediate release tablet     Sig: Take 1 tablet by mouth every 8 hours as needed for Pain for up to 5 doses. Intended supply: 3 days.  Take lowest dose possible to manage pain     Dispense:  5 tablet     Refill:  0       DDX:     Chronic hernia - patient recently admitted and found to have parastomal hernia, patient scheduled to have hernia repair on 10/20    Mesenteric artery thrombosis - patients physcial not consistent with this, will get lactic acid    SBO - patient is having bowel movements and passing stool making this unlikley    DIAGNOSTIC RESULTS / EMERGENCY DEPARTMENT COURSE / MDM   LAB RESULTS:  Results for orders placed or performed during the hospital encounter of 09/28/21   CBC Auto Differential   Result Value Ref Range    WBC 10.9 3.5 - 11.3 k/uL    RBC 4.70 3.95 - 5.11 m/uL    Hemoglobin 13.4 11.9 - 15.1 g/dL    Hematocrit 43.7 36.3 - 47.1 %    MCV 93.0 82.6 - 102.9 fL    MCH 28.5 25.2 - 33.5 pg    MCHC 30.7 28.4 - 34.8 g/dL    RDW 13.2 11.8 - 14.4 %    Platelets 529 040 - 064 k/uL    MPV 11.2 8.1 - 13.5 fL    NRBC Automated 0.2 (H) 0.0 per 100 WBC    Differential Type NOT REPORTED     Seg Neutrophils 59 36 - 65 %    Lymphocytes 28 24 - 43 %    Monocytes 6 3 - 12 %    Eosinophils % 6 (H) 1 - 4 %    Basophils 0 0 - 2 %    Immature Granulocytes 1 (H) 0 %    Segs Absolute 6.55 1.50 - 8.10 k/uL    Absolute Lymph # 3.01 1.10 - 3.70 k/uL    Absolute Mono # 0.65 0.10 - 1.20 k/uL    Absolute Eos # 0.62 (H) 0.00 - 0.44 k/uL    Basophils Absolute 0. 04 0.00 - 0.20 k/uL    Absolute Immature Granulocyte 0.05 0.00 - 0.30 k/uL    WBC Morphology NOT REPORTED     RBC Morphology NOT REPORTED     Platelet Estimate NOT REPORTED    Comprehensive Metabolic Panel w/ Reflex to MG   Result Value Ref Range    Glucose 121 (H) 70 - 99 mg/dL    BUN 9 6 - 20 mg/dL    CREATININE 0.51 0.50 - 0.90 mg/dL    Bun/Cre Ratio NOT REPORTED 9 - 20    Calcium 9.5 8.6 - 10.4 mg/dL    Sodium 137 135 - 144 mmol/L    Potassium 3.4 (L) 3.7 - 5.3 mmol/L    Chloride 100 98 - 107 mmol/L    CO2 23 20 - 31 mmol/L    Anion Gap 14 9 - 17 mmol/L    Alkaline Phosphatase 185 (H) 35 - 104 U/L    ALT 11 5 - 33 U/L    AST 13 <32 U/L    Total Bilirubin 0.27 (L) 0.3 - 1.2 mg/dL    Total Protein 8.3 6.4 - 8.3 g/dL    Albumin 3.9 3.5 - 5.2 g/dL    Albumin/Globulin Ratio 0.9 (L) 1.0 - 2.5    GFR Non-African American >60 >60 mL/min    GFR African American >60 >60 mL/min    GFR Comment          GFR Staging NOT REPORTED    Lipase   Result Value Ref Range    Lipase 9 (L) 13 - 60 U/L   Lactic Acid, Plasma   Result Value Ref Range    Lactic Acid NOT REPORTED mmol/L    Lactic Acid, Whole Blood 1.5 0.7 - 2.1 mmol/L   Magnesium   Result Value Ref Range    Magnesium 1.9 1.6 - 2.6 mg/dL       IMPRESSION: No acute process    RADIOLOGY:  None    EKG  None    All EKG's are interpreted by the Emergency Department Physician who either signs or Co-signs this chart in the absence of a cardiologist.    EMERGENCY DEPARTMENT COURSE:  ED Course as of Sep 30 1215   Tue Sep 28, 2021   1258 Patient evaluated, history/physical exam not concerning for peritoneal signs or acute process. Will get labs and provide pain control. No indication for imagining at this time    [ZE]   1318 Patient reevaluated and abdominal pain has improved.  Patients labs unremarkable for acute process     [ZE]      ED Course User Index  [ZE] Paige Mann DO         PROCEDURES:  None    CONSULTS:  None    CRITICAL CARE:  None    FINAL IMPRESSION      1. Epigastric pain    2.  Periumbilical abdominal pain          DISPOSITION / PLAN     DISPOSITION  - discharge to home      PATIENT REFERRED TO:  [facility] ED  [address] [id number] [phone]    As needed, If symptoms worsen      DISCHARGE MEDICATIONS:  Discharge Medication List as of 9/28/2021  2:04 PM          Hector Orta DO  Emergency Medicine Resident    (Please note that portions of thisnote were completed with a voice recognition program.  Efforts were made to edit the dictations but occasionally words are mis-transcribed.)      Braxton Naik DO  Resident  09/30/21 1490

## 2021-10-20 LAB — GLUCOSE BLD-MCNC: 92 MG/DL (ref 70–100)

## 2022-01-27 LAB
ALBUMIN: 3.7 G/DL (ref 3.5–5.7)
ALP BLD-CCNC: 157 IU/L (ref 34–104)
ALT SERPL-CCNC: 12 IU/L (ref 7–52)
AST SERPL-CCNC: 12 IU/L (ref 13–39)
BILIRUB SERPL-MCNC: 0.3 MG/DL (ref 0.3–1)
BUN BLDV-MCNC: 9 MG/DL (ref 7–25)
CALCIUM SERPL-MCNC: 8.9 MG/DL (ref 8.6–10.3)
CHLORIDE BLD-SCNC: 104 MEQ/L (ref 98–107)
CO2: 26 MEQ/L (ref 21–31)
CREAT SERPL-MCNC: 0.57 MG/DL (ref 0.6–1.2)
EGFR AFRICAN AMERICAN: >60 ML/MIN/1.73SQ M
EGFR IF NONAFRICAN AMERICAN: >60 ML/MIN/1.73SQ M
ESTIMATED AVERAGE GLUCOSE: 166 MMOL/L
GLUCOSE: 105 MG/DL (ref 70–100)
HBA1C MFR BLD: 7.4 % (ref 4–6)
POTASSIUM SERPL-SCNC: 4.2 MEQ/L (ref 3.5–5.1)
PROTEIN TOTAL: 7 G/DL (ref 6–8.3)
SODIUM BLD-SCNC: 137 MEQ/L (ref 136–145)

## 2022-04-19 LAB
CREAT SERPL-MCNC: 0.56 MG/DL (ref 0.6–1.2)
EGFR AFRICAN AMERICAN: >60 ML/MIN/1.73SQ M
EGFR IF NONAFRICAN AMERICAN: >60 ML/MIN/1.73SQ M

## 2022-06-19 ENCOUNTER — HOSPITAL ENCOUNTER (EMERGENCY)
Age: 62
Discharge: HOME | End: 2022-06-20
Attending: EMERGENCY MEDICINE
Payer: COMMERCIAL

## 2022-06-19 ENCOUNTER — APPOINTMENT (OUTPATIENT)
Dept: GENERAL RADIOLOGY | Age: 62
End: 2022-06-19
Payer: COMMERCIAL

## 2022-06-19 ENCOUNTER — APPOINTMENT (OUTPATIENT)
Dept: CT IMAGING | Age: 62
End: 2022-06-19
Payer: COMMERCIAL

## 2022-06-19 VITALS
HEIGHT: 63 IN | OXYGEN SATURATION: 98 % | SYSTOLIC BLOOD PRESSURE: 124 MMHG | BODY MASS INDEX: 38.16 KG/M2 | RESPIRATION RATE: 14 BRPM | HEART RATE: 82 BPM | DIASTOLIC BLOOD PRESSURE: 75 MMHG | WEIGHT: 215.39 LBS | TEMPERATURE: 98.4 F

## 2022-06-19 DIAGNOSIS — R10.84 GENERALIZED ABDOMINAL PAIN: Primary | ICD-10-CM

## 2022-06-19 LAB
-: ABNORMAL
ABSOLUTE EOS #: 0.19 K/UL (ref 0–0.44)
ABSOLUTE IMMATURE GRANULOCYTE: 0.03 K/UL (ref 0–0.3)
ABSOLUTE LYMPH #: 3.24 K/UL (ref 1.1–3.7)
ABSOLUTE MONO #: 0.61 K/UL (ref 0.1–1.2)
ALBUMIN SERPL-MCNC: 3.8 G/DL (ref 3.5–5.2)
ALBUMIN/GLOBULIN RATIO: 1 (ref 1–2.5)
ALP BLD-CCNC: 198 U/L (ref 35–104)
ALT SERPL-CCNC: 29 U/L (ref 5–33)
ANION GAP SERPL CALCULATED.3IONS-SCNC: 11 MMOL/L (ref 9–17)
AST SERPL-CCNC: 21 U/L
BASOPHILS # BLD: 0 % (ref 0–2)
BASOPHILS ABSOLUTE: 0.03 K/UL (ref 0–0.2)
BILIRUB SERPL-MCNC: <0.1 MG/DL (ref 0.3–1.2)
BILIRUBIN DIRECT: <0.08 MG/DL
BILIRUBIN URINE: NEGATIVE
BILIRUBIN, INDIRECT: ABNORMAL MG/DL (ref 0–1)
BUN BLDV-MCNC: 8 MG/DL (ref 8–23)
CALCIUM SERPL-MCNC: 8.9 MG/DL (ref 8.6–10.4)
CASTS UA: ABNORMAL /LPF (ref 0–8)
CHLORIDE BLD-SCNC: 100 MMOL/L (ref 98–107)
CO2: 26 MMOL/L (ref 20–31)
COLOR: YELLOW
CREAT SERPL-MCNC: 0.9 MG/DL (ref 0.5–0.9)
EOSINOPHILS RELATIVE PERCENT: 2 % (ref 1–4)
EPITHELIAL CELLS UA: ABNORMAL /HPF (ref 0–5)
GFR AFRICAN AMERICAN: >60 ML/MIN
GFR NON-AFRICAN AMERICAN: >60 ML/MIN
GFR SERPL CREATININE-BSD FRML MDRD: ABNORMAL ML/MIN/{1.73_M2}
GLUCOSE BLD-MCNC: 238 MG/DL (ref 70–99)
GLUCOSE URINE: ABNORMAL
HCT VFR BLD CALC: 39.1 % (ref 36.3–47.1)
HEMOGLOBIN: 12.4 G/DL (ref 11.9–15.1)
IMMATURE GRANULOCYTES: 0 %
KETONES, URINE: NEGATIVE
LEUKOCYTE ESTERASE, URINE: NEGATIVE
LIPASE: 17 U/L (ref 13–60)
LYMPHOCYTES # BLD: 39 % (ref 24–43)
MCH RBC QN AUTO: 27.9 PG (ref 25.2–33.5)
MCHC RBC AUTO-ENTMCNC: 31.7 G/DL (ref 28.4–34.8)
MCV RBC AUTO: 87.9 FL (ref 82.6–102.9)
MONOCYTES # BLD: 7 % (ref 3–12)
NITRITE, URINE: NEGATIVE
NRBC AUTOMATED: 0 PER 100 WBC
PDW BLD-RTO: 13.4 % (ref 11.8–14.4)
PH UA: 6.5 (ref 5–8)
PLATELET # BLD: 233 K/UL (ref 138–453)
PMV BLD AUTO: 11.4 FL (ref 8.1–13.5)
POTASSIUM SERPL-SCNC: 3.6 MMOL/L (ref 3.7–5.3)
PRO-BNP: <20 PG/ML
PROTEIN UA: NEGATIVE
RBC # BLD: 4.45 M/UL (ref 3.95–5.11)
RBC UA: ABNORMAL /HPF (ref 0–4)
SEG NEUTROPHILS: 52 % (ref 36–65)
SEGMENTED NEUTROPHILS ABSOLUTE COUNT: 4.32 K/UL (ref 1.5–8.1)
SODIUM BLD-SCNC: 137 MMOL/L (ref 135–144)
SPECIFIC GRAVITY UA: 1.02 (ref 1–1.03)
TOTAL PROTEIN: 7.6 G/DL (ref 6.4–8.3)
TROPONIN, HIGH SENSITIVITY: 14 NG/L (ref 0–14)
TROPONIN, HIGH SENSITIVITY: 15 NG/L (ref 0–14)
TURBIDITY: CLEAR
URINE HGB: NEGATIVE
UROBILINOGEN, URINE: NORMAL
WBC # BLD: 8.4 K/UL (ref 3.5–11.3)
WBC UA: ABNORMAL /HPF (ref 0–5)

## 2022-06-19 PROCEDURE — 96374 THER/PROPH/DIAG INJ IV PUSH: CPT

## 2022-06-19 PROCEDURE — 84484 ASSAY OF TROPONIN QUANT: CPT

## 2022-06-19 PROCEDURE — 96375 TX/PRO/DX INJ NEW DRUG ADDON: CPT

## 2022-06-19 PROCEDURE — 83880 ASSAY OF NATRIURETIC PEPTIDE: CPT

## 2022-06-19 PROCEDURE — 6360000002 HC RX W HCPCS: Performed by: STUDENT IN AN ORGANIZED HEALTH CARE EDUCATION/TRAINING PROGRAM

## 2022-06-19 PROCEDURE — 80076 HEPATIC FUNCTION PANEL: CPT

## 2022-06-19 PROCEDURE — 93005 ELECTROCARDIOGRAM TRACING: CPT | Performed by: STUDENT IN AN ORGANIZED HEALTH CARE EDUCATION/TRAINING PROGRAM

## 2022-06-19 PROCEDURE — 85025 COMPLETE CBC W/AUTO DIFF WBC: CPT

## 2022-06-19 PROCEDURE — 80048 BASIC METABOLIC PNL TOTAL CA: CPT

## 2022-06-19 PROCEDURE — 99285 EMERGENCY DEPT VISIT HI MDM: CPT

## 2022-06-19 PROCEDURE — 81001 URINALYSIS AUTO W/SCOPE: CPT

## 2022-06-19 PROCEDURE — 71045 X-RAY EXAM CHEST 1 VIEW: CPT

## 2022-06-19 PROCEDURE — 83690 ASSAY OF LIPASE: CPT

## 2022-06-19 RX ORDER — FENTANYL CITRATE 50 UG/ML
50 INJECTION, SOLUTION INTRAMUSCULAR; INTRAVENOUS ONCE
Status: COMPLETED | OUTPATIENT
Start: 2022-06-20 | End: 2022-06-20

## 2022-06-19 RX ORDER — FENTANYL CITRATE 50 UG/ML
50 INJECTION, SOLUTION INTRAMUSCULAR; INTRAVENOUS ONCE
Status: COMPLETED | OUTPATIENT
Start: 2022-06-19 | End: 2022-06-19

## 2022-06-19 RX ADMIN — FENTANYL CITRATE 50 MCG: 50 INJECTION, SOLUTION INTRAMUSCULAR; INTRAVENOUS at 22:16

## 2022-06-19 ASSESSMENT — ENCOUNTER SYMPTOMS
SHORTNESS OF BREATH: 0
BACK PAIN: 0
VOMITING: 0
ABDOMINAL PAIN: 1
COUGH: 0
DIARRHEA: 0
CONSTIPATION: 0
NAUSEA: 0

## 2022-06-20 ENCOUNTER — APPOINTMENT (OUTPATIENT)
Dept: CT IMAGING | Age: 62
End: 2022-06-20
Payer: COMMERCIAL

## 2022-06-20 LAB
EKG ATRIAL RATE: 81 BPM
EKG P AXIS: 57 DEGREES
EKG P-R INTERVAL: 146 MS
EKG Q-T INTERVAL: 404 MS
EKG QRS DURATION: 74 MS
EKG QTC CALCULATION (BAZETT): 469 MS
EKG R AXIS: 22 DEGREES
EKG T AXIS: 41 DEGREES
EKG VENTRICULAR RATE: 81 BPM

## 2022-06-20 PROCEDURE — 93010 ELECTROCARDIOGRAM REPORT: CPT | Performed by: INTERNAL MEDICINE

## 2022-06-20 PROCEDURE — 74177 CT ABD & PELVIS W/CONTRAST: CPT

## 2022-06-20 PROCEDURE — 6360000004 HC RX CONTRAST MEDICATION: Performed by: STUDENT IN AN ORGANIZED HEALTH CARE EDUCATION/TRAINING PROGRAM

## 2022-06-20 PROCEDURE — 6360000002 HC RX W HCPCS: Performed by: STUDENT IN AN ORGANIZED HEALTH CARE EDUCATION/TRAINING PROGRAM

## 2022-06-20 RX ORDER — CALCIUM CARBONATE 200(500)MG
1 TABLET,CHEWABLE ORAL DAILY
Qty: 7 TABLET | Refills: 0 | Status: SHIPPED | OUTPATIENT
Start: 2022-06-20 | End: 2022-06-27

## 2022-06-20 RX ADMIN — IOPAMIDOL 75 ML: 755 INJECTION, SOLUTION INTRAVENOUS at 00:11

## 2022-06-20 RX ADMIN — FENTANYL CITRATE 50 MCG: 50 INJECTION, SOLUTION INTRAMUSCULAR; INTRAVENOUS at 00:03

## 2022-06-20 ASSESSMENT — PAIN SCALES - GENERAL: PAINLEVEL_OUTOF10: 10

## 2022-06-20 NOTE — ED PROVIDER NOTES
[facility]     Emergency Department     Faculty Attestation    I performed a history and physical examination of the patient and discussed management with the resident. I reviewed the residents note and agree with the documented findings and plan of care. Any areas of disagreement are noted on the chart. I was personally present for the key portions of any procedures. I have documented in the chart those procedures where I was not present during the key portions. I have reviewed the emergency nurses triage note. I agree with the chief complaint, past medical history, past surgical history, allergies, medications, social and family history as documented unless otherwise noted below. For Physician Assistant/ Nurse Practitioner cases/documentation I have personally evaluated this patient and have completed at least one if not all key elements of the E/M (history, physical exam, and MDM). Additional findings are as noted. Patient with abdominal pain for last 3 days. Mild diffuse. Somewhat decreased appetite but no nausea vomiting or fevers. Does have an ostomy normal output no blood. States when she has had problems like this before its \"my hernias. \"  On exam patient peers uncomfortable but nontoxic. Abdomen soft mild diffuse tenderness. No blood in the ostomy bag. Will check labs, plan for CT, possible admit      Critical Care     none    Argelia Painter MD, Katy Velez  Attending Emergency  Physician             Argelia Painter MD  06/19/22 2222    EKG interpretation: Sinus from 81 normal intervals normal axis. No acute ST or T changes.   Normal EKG     Argelia Painter MD  06/19/22 [id number]

## 2022-06-20 NOTE — ED NOTES
The following labs labeled with pt sticker and tubed to lab:     [x] Blue     [x] Lavender   [] on ice  [x] Green/yellow  [x] Green/black [] on ice  [] Yellow  [] Red  [] Pink      [] COVID-19 swab    [] Rapid  [] PCR  [] Flu swab  [] Peds Viral Panel     [x] Urine Sample  [] Pelvic Cultures  [] Blood Cultures            Lo Murphy RN  06/19/22 [id number]

## 2022-06-20 NOTE — PROGRESS NOTES
This patient was assigned to me by error. This patient was never interviewed nor examined by me. I did not participate in the care of this patient.     Maldonado Kong MD  Emergency Medicine Resident

## 2022-06-20 NOTE — ED NOTES
The following labs labeled with pt sticker and tubed to lab:     [] Blue     [] Lavender   [] on ice  [x] Green/yellow  [] Green/black [] on ice  [] Yellow  [] Red  [] Pink      [] COVID-19 swab    [] Rapid  [] PCR  [] Flu swab  [] Peds Viral Panel     [] Urine Sample  [] Pelvic Cultures  [] Blood Cultures            Ronny Mayo RN  06/19/22 [id number]

## 2022-06-20 NOTE — ED PROVIDER NOTES
[facility] ED  Emergency Department Encounter  EmergencyMedicine Resident     Pt Abiodun Ribeiro  MRN: [id number]  [place] 1960  Date of evaluation: 6/19/22  PCP:  Summer Espino    This patient was evaluated in the Emergency Department for symptoms described in the history of present illness. The patient was evaluated in the context of the global COVID-19 pandemic, which necessitated consideration that the patient might be at risk for infection with the SARS-CoV-2 virus that causes COVID-19. Institutional protocols and algorithms that pertain to the evaluation of patients at risk for COVID-19 are in a state of rapid change based on information released by regulatory bodies including the CDC and federal and state organizations. These policies and algorithms were followed during the patient's care in the ED. CHIEF COMPLAINT       Chief Complaint   Patient presents with    Abdominal Pain       HISTORY OF PRESENT ILLNESS  (Location/Symptom, Timing/Onset, Context/Setting, Quality, Duration, Modifying Factors, Severity.)      Albertina Xiong is a 62 y.o. female with extensive abdominal surgery history with ostomy in place who presents with diffuse abdominal pain. Patient states that for 1 week she has been experiencing diffuse abdominal pain that is worse in the epigastric and suprapubic regions. States that she normally has abdominal pain but this is worse. Denies acute inciting event. Denies flank pain, hematuria, dysuria, vaginal discharge or bleeding, chest pain or shortness of breath, fever. Ostomy output has been consistent, no changes. Tolerating p.o., denies nausea/vomiting. No other complaints at this time.     PAST MEDICAL / SURGICAL / SOCIAL / FAMILY HISTORY      has a past medical history of Allergic rhinitis, Arthritis, Chronic back pain, Diverticulitis, GERD (gastroesophageal reflux disease), Headache(784.0), Hyperlipidemia, Hypertension, Knee pain, MDRO (multiple drug resistant organisms) resistance, Narcotic abuse ([facility] [address] Obesity, Osteoarthritis, Rectovaginal fistula, Type II or unspecified type diabetes mellitus without mention of complication, not stated as uncontrolled, and Urinary incontinence. has a past surgical history that includes Knee Arthroplasty; Abdomen surgery (5/1/16); ventral hernia repair (441181); Abdomen surgery (01/21/2014); and Abdominal exploration surgery (01/02/2017). Social History     Socioeconomic History    Marital status: Single     Spouse name: Not on file    Number of children: Not on file    Years of education: Not on file    Highest education level: Not on file   Occupational History    Not on file   Tobacco Use    Smoking status: Current Every Day Smoker     Packs/day: 1.00     Years: 30.00     Pack years: 30.00     Types: Cigarettes     Start date: 10/24/1989    Smokeless tobacco: Never Used    Tobacco comment: 1 cigarette/day   Substance and Sexual Activity    Alcohol use: No     Alcohol/week: 0.0 standard drinks    Drug use: No    Sexual activity: Never   Other Topics Concern    Not on file   Social History Narrative    Not on file     Social Determinants of Health     Financial Resource Strain:     Difficulty of Paying Living Expenses: Not on file   Food Insecurity:     Worried About Running Out of Food in the Last Year: Not on file    Shey of Food in the Last Year: Not on file   Transportation Needs:     Lack of Transportation (Medical): Not on file    Lack of Transportation (Non-Medical):  Not on file   Physical Activity:     Days of Exercise per Week: Not on file    Minutes of Exercise per Session: Not on file   Stress:     Feeling of Stress : Not on file   Social Connections:     Frequency of Communication with Friends and Family: Not on file    Frequency of Social Gatherings with Friends and Family: Not on file    Attends Bahai Services: Not on file    Active Member of Clubs or Organizations: Not on file    Attends Club or Organization Meetings: Not on file    Marital Status: Not on file   Intimate Partner Violence:     Fear of Current or Ex-Partner: Not on file    Emotionally Abused: Not on file    Physically Abused: Not on file    Sexually Abused: Not on file   Housing Stability:     Unable to Pay for Housing in the Last Year: Not on file    Number of Ezequiel in the Last Year: Not on file    Unstable Housing in the Last Year: Not on file       Family History   Problem Relation Age of Onset    Diabetes Mother     Cancer Father         COLON, PROSTATE    Heart Disease Paternal Aunt        Allergies:    Lisinopril, Ace inhibitors, Ciprofloxacin, Metaxalone, Other, and Penicillins    Home Medications:  Prior to Admission medications    Medication Sig Start Date End Date Taking?  Authorizing Provider   calcium carbonate (ANTACID) 500 MG chewable tablet Take 1 tablet by mouth daily for 7 days 6/20/22 6/27/22 Yes Samson Ranellone, DO   famotidine (PEPCID) 20 MG tablet TAKE ONE TABLET BY MOUTH TWICE DAILY 5/1/20   Jorge Renee MD   clotrimazole (LOTRIMIN) 1 % cream APPLY TO AFFECTED AREA TWICE DAILY 5/1/20   Jorge Renee MD   diphenhydrAMINE (GNP ALLERGY) 25 MG tablet Take 1 tablet by mouth every 8 hours as needed for Itching 4/3/20   Jorge Renee MD   glipiZIDE (GLUCOTROL) 5 MG tablet TAKE ONE TABLET BY MOUTH TWICE DAILY 4/3/20   Jorge Renee MD   carvedilol (COREG) 3.125 MG tablet TAKE ONE TABLET BY MOUTH TWICE DAILY 4/3/20   Jorge Renee MD   LANTUS SOLOSTAR 100 UNIT/ML injection pen INJECT 100 UNITS UNDER THE SKIN NIGHTLY 4/3/20   Jorge Renee MD   EASY TOUCH PEN NEEDLES 32G X 6 MM MISC USE AS DIRECTED 4/3/20   Jorge Renee MD   atorvastatin (LIPITOR) 80 MG tablet TAKE ONE TABLET BY MOUTH DAILY 4/3/20   Jorge Renee MD   raNITIdine (ZANTAC) 150 MG tablet Take 1 tablet by mouth 2 times daily 3/13/20   Jorge Renee MD   acetaminophen (TYLENOL) 325 MG tablet TAKE ONE TABLET BY MOUTH EVERY SIX HOURS AS NEEDED FOR PAIN 3/11/20 4/10/20  Pilar Longoria MD   ibuprofen (ADVIL;MOTRIN) 400 MG tablet TAKE ONE TABLET BY MOUTH EVERY SIX HOURS AS NEEDED FOR PAIN 3/11/20 4/10/20  Pilar Longoria MD   dicyclomine (BENTYL) 20 MG tablet TAKE ONE TABLET BY MOUTH THREE TIMES DAILY AS NEEDED FOR ABDOMINAL PAIN 3/11/20 4/10/20  Pilar Longoria MD   triamcinolone (KENALOG) 0.025 % cream APPLY TO AFFECTED AREA TWICE DAILY 3/11/20   Pilar Longoria MD   insulin lispro, 1 Unit Dial, 100 UNIT/ML SOPN INJECT 25 UNITS SUBCUTANEOUSLY THREE TIMES DAILY BEFORE MEALS 3/11/20 4/10/20  Pilar Longoria MD   ULTICARE ALCOHOL SWABS 70 % PADS USE AS DIRECTED 1/15/20   Pilar Longoria MD   nystatin (MYCOSTATIN) 767624 UNIT/GM cream Apply topically 2 times daily. 10/24/19   Pilar Longoria MD   aspirin (ASPIRIN LOW DOSE) 81 MG EC tablet TAKE ONE TABLET BY MOUTH DAILY 10/24/19   Pilar Longoria MD   Incontinence Supplies MISC Use as needed for incontinence 10/24/19   Pilar Longoria MD   ipratropium-albuterol (DUONEB) 0.5-2.5 (3) MG/3ML SOLN nebulizer solution Inhale 3 mLs into the lungs 4 times daily as needed for Shortness of Breath 10/24/19   Pilar Longoria MD   nicotine (NICODERM CQ) 21 MG/24HR Place 1 patch onto the skin every 24 hours 10/24/19 10/23/20  Pilar Longoria MD   TRUEPLUS LANCETS 33G MISC USE AS DIRECTED THREE TIMES DAILY 10/24/19   Pilar Longoria MD   blood glucose test strips (TRUE METRIX BLOOD GLUCOSE TEST) strip TEST BLOOD SUGAR DAILY AS DIRECTED 10/24/19   Pilar Longoria MD   SURE COMFORT PEN NEEDLES 32G X 4 MM MISC USE AS DIRECTED 7/11/19   Pilar Longoria MD   metFORMIN (GLUCOPHAGE) 1000 MG tablet TAKE ONE TABLET BY MOUTH TWICE DAILY WITH MEALS 5/1/18   Dilip Asif MD       REVIEW OF SYSTEMS    (2-9 systems for level 4, 10 or more for level 5)    Review of Systems   Constitutional: Negative for chills and fever. HENT: Negative for congestion. Eyes: Negative for visual disturbance. Respiratory: Negative for cough and shortness of breath.     Cardiovascular: Negative for chest pain. Gastrointestinal: Positive for abdominal pain. Negative for constipation, diarrhea, nausea and vomiting. Genitourinary: Negative for difficulty urinating, dysuria, vaginal bleeding and vaginal discharge. Musculoskeletal: Negative for back pain. Skin: Negative for wound. Neurological: Negative for weakness, numbness and headaches. PHYSICAL EXAM   (up to 7 for level 4, 8 or more for level 5)    INITIAL VITALS:   /75   Pulse 82   Temp 98.4 °F (36.9 °C) (Oral)   Resp 14   Ht 5' 3\" (1.6 m)   Wt 215 lb 6.2 oz (97.7 kg)   SpO2 98%   BMI 38.15 kg/m²   I have reviewed the triage vital signs. Const: Well nourished, well developed, appears stated age  Eyes: PERRL, no conjunctival injection  HENT: NCAT, Neck supple without meningismus   CV: RRR, Warm, well-perfused extremities  RESP: CTAB, Unlabored respiratory effort  GI: Abdomen is diffusely tender to palpation, soft, nondistended, no masses. Ostomy in left lower quadrant clean, nonerythematous, small stool output. MSK: No gross deformities appreciated  Skin: Warm, dry. No rashes  Neuro: Alert, CNs II-XII grossly intact. Sensation and motor function of extremities grossly intact. Psych: Appropriate mood and affect. DIFFERENTIAL  DIAGNOSIS   DDX:  Bowel obstruction, ostomy complication, appendicitis, pancreatitis, gastroenteritis, cholecystitis    Initial MDM:  81-year-old female with extensive abdominal surgery with ostomy in place presents to the emergency department with diffuse abdominal pain. No other complaints at this time. Abdomen is diffusely tender to palpation, soft, nondistended, no masses. Ostomy in left lower quadrant clean, nonerythematous, small stool output. We will treat pain with fentanyl. Will work-up for atypical chest pain including chest x-ray, EKG, troponin x2, BNP. Will get CBC, BMP, lipase, LFTs, urinalysis, CT abdomen pelvis with contrast.  Will reevaluate.     PLAN (Chloe Roberson / Ross Thurman / EKG):  Orders Placed This Encounter   Procedures    XR CHEST PORTABLE    CT ABDOMEN PELVIS W IV CONTRAST Additional Contrast? None    CBC with Auto Differential    Basic Metabolic Panel    Lipase    Hepatic Function Panel    Urinalysis with Microscopic    Troponin    Brain Natriuretic Peptide    Troponin    EKG 12 Lead       MEDICATIONS ORDERED:  Orders Placed This Encounter   Medications    fentaNYL (SUBLIMAZE) injection 50 mcg    fentaNYL (SUBLIMAZE) injection 50 mcg    iopamidol (ISOVUE-370) 76 % injection 75 mL    calcium carbonate (ANTACID) 500 MG chewable tablet     Sig: Take 1 tablet by mouth daily for 7 days     Dispense:  7 tablet     Refill:  0         DIAGNOSTIC RESULTS / EMERGENCY DEPARTMENT COURSE / MDM   LAB RESULTS:  Results for orders placed or performed during the hospital encounter of 06/19/22   CBC with Auto Differential   Result Value Ref Range    WBC 8.4 3.5 - 11.3 k/uL    RBC 4.45 3.95 - 5.11 m/uL    Hemoglobin 12.4 11.9 - 15.1 g/dL    Hematocrit 39.1 36.3 - 47.1 %    MCV 87.9 82.6 - 102.9 fL    MCH 27.9 25.2 - 33.5 pg    MCHC 31.7 28.4 - 34.8 g/dL    RDW 13.4 11.8 - 14.4 %    Platelets 291 013 - 026 k/uL    MPV 11.4 8.1 - 13.5 fL    NRBC Automated 0.0 0.0 per 100 WBC    Seg Neutrophils 52 36 - 65 %    Lymphocytes 39 24 - 43 %    Monocytes 7 3 - 12 %    Eosinophils % 2 1 - 4 %    Basophils 0 0 - 2 %    Immature Granulocytes 0 0 %    Segs Absolute 4.32 1.50 - 8.10 k/uL    Absolute Lymph # 3.24 1.10 - 3.70 k/uL    Absolute Mono # 0.61 0.10 - 1.20 k/uL    Absolute Eos # 0.19 0.00 - 0.44 k/uL    Basophils Absolute 0.03 0.00 - 0.20 k/uL    Absolute Immature Granulocyte 0.03 0.00 - 0.30 k/uL   Basic Metabolic Panel   Result Value Ref Range    Glucose 238 (H) 70 - 99 mg/dL    BUN 8 8 - 23 mg/dL    CREATININE 0.90 0.50 - 0.90 mg/dL    Calcium 8.9 8.6 - 10.4 mg/dL    Sodium 137 135 - 144 mmol/L    Potassium 3.6 (L) 3.7 - 5.3 mmol/L    Chloride 100 98 - 107 mmol/L    CO2 26 20 - 31 mmol/L Anion Gap 11 9 - 17 mmol/L    GFR Non-African American >60 >60 mL/min    GFR African American >60 >60 mL/min    GFR Comment         Lipase   Result Value Ref Range    Lipase 17 13 - 60 U/L   Hepatic Function Panel   Result Value Ref Range    Albumin 3.8 3.5 - 5.2 g/dL    Alkaline Phosphatase 198 (H) 35 - 104 U/L    ALT 29 5 - 33 U/L    AST 21 <32 U/L    Total Bilirubin <0.10 (L) 0.3 - 1.2 mg/dL    Bilirubin, Direct <0.08 <0.31 mg/dL    Bilirubin, Indirect Can not be calculated 0.00 - 1.00 mg/dL    Total Protein 7.6 6.4 - 8.3 g/dL    Albumin/Globulin Ratio 1.0 1.0 - 2.5   Urinalysis with Microscopic   Result Value Ref Range    Color, UA Yellow Yellow    Turbidity UA Clear Clear    Glucose, Ur 3+ (A) NEGATIVE    Bilirubin Urine NEGATIVE NEGATIVE    Ketones, Urine NEGATIVE NEGATIVE    Specific Gravity, UA 1.023 1.005 - 1.030    Urine Hgb NEGATIVE NEGATIVE    pH, UA 6.5 5.0 - 8.0    Protein, UA NEGATIVE NEGATIVE    Urobilinogen, Urine Normal Normal    Nitrite, Urine NEGATIVE NEGATIVE    Leukocyte Esterase, Urine NEGATIVE NEGATIVE    -          WBC, UA 0 TO 2 0 - 5 /HPF    RBC, UA 0 TO 2 0 - 4 /HPF    Casts UA  0 - 8 /LPF     2 TO 5 HYALINE Reference range defined for non-centrifuged specimen. Epithelial Cells UA 2 TO 5 0 - 5 /HPF   Troponin   Result Value Ref Range    Troponin, High Sensitivity 15 (H) 0 - 14 ng/L   Brain Natriuretic Peptide   Result Value Ref Range    Pro-BNP <20 <300 pg/mL   Troponin   Result Value Ref Range    Troponin, High Sensitivity 14 0 - 14 ng/L       Hyperglycemic at 238. Patient with history of hyperglycemia. Alk phos elevated at 198, consistent with patient's baseline. Troponin x2 15-14. Rest of lab work is unremarkable. RADIOLOGY:  XR CHEST PORTABLE    Result Date: 6/19/2022  EXAMINATION: ONE XRAY VIEW OF THE CHEST 6/19/2022 10:37 pm COMPARISON: None.  HISTORY: ORDERING SYSTEM PROVIDED HISTORY: epigastric abd pain TECHNOLOGIST PROVIDED HISTORY: epigastric abd pain FINDINGS: The note that portions of this note were completed with a voice recognition program.  Efforts were made to edit the dictations but occasionally words are mis-transcribed.)       Giovani Martinez DO  Resident  06/20/22 1629

## 2022-06-20 NOTE — ED PROVIDER NOTES
Faculty Sign-Out Attestation  Handoff taken on the following patient from prior Attending Physician: Gideon Beaver    I was available and discussed any additional care issues that arose and coordinated the management plans with the resident(s) caring for the patient during my duty period. Any areas of disagreement with residents documentation of care or procedures are noted on the chart. I was personally present for the key portions of any/all procedures during my duty period. I have documented in the chart those procedures where I was not present during the key portions.     Abdominal pain, lab -, ct -, possible discharge    Anish Stewart DO  Attending Physician     Anish Stewart DO  06/20/22 0034    S/s improved, eval stable,   Will discharge     Anish Stewart DO  06/20/22 [id number]

## 2022-06-20 NOTE — ED NOTES
Pt Presents to the ER w/  c/o abdominal pain and ambulatory to room. Pt has hx of multiple surgeries on stomach and states has two hernias. Pt. States she has intermittent stomach pain but has gotten to be too much too handle. Dr. Rosy Amador and Resident at bedside. NAD at this time, VSS, A&O x4. Pt placed on monitor and 12 lead taken, & urine sent. Pt resting on stretcher with no other complaints at this time. Call light within reach.       Jesse Chris RN  06/19/22 [id number]

## 2022-10-05 NOTE — TELEPHONE ENCOUNTER
PC to pt-- she states that she was initially calling for a refill-- the last script was only for 5 pills. Can you please send in a refill to patients pharmacy. Thank you! Her/She

## 2024-12-17 ENCOUNTER — HOSPITAL ENCOUNTER (EMERGENCY)
Age: 64
Discharge: LEFT BEFORE DISCHARGE | End: 2024-12-17

## 2024-12-17 VITALS
TEMPERATURE: 97.5 F | SYSTOLIC BLOOD PRESSURE: 171 MMHG | OXYGEN SATURATION: 99 % | HEART RATE: 95 BPM | DIASTOLIC BLOOD PRESSURE: 73 MMHG | RESPIRATION RATE: 20 BRPM

## 2024-12-17 ASSESSMENT — LIFESTYLE VARIABLES
HOW OFTEN DO YOU HAVE A DRINK CONTAINING ALCOHOL: NEVER
HOW MANY STANDARD DRINKS CONTAINING ALCOHOL DO YOU HAVE ON A TYPICAL DAY: PATIENT DOES NOT DRINK

## 2024-12-18 ENCOUNTER — HOSPITAL ENCOUNTER (EMERGENCY)
Age: 64
Discharge: HOME | End: 2024-12-18
Attending: EMERGENCY MEDICINE
Payer: COMMERCIAL

## 2024-12-18 VITALS
TEMPERATURE: 97.7 F | SYSTOLIC BLOOD PRESSURE: 142 MMHG | HEIGHT: 66 IN | OXYGEN SATURATION: 100 % | WEIGHT: 256 LBS | RESPIRATION RATE: 16 BRPM | DIASTOLIC BLOOD PRESSURE: 97 MMHG | BODY MASS INDEX: 41.14 KG/M2 | HEART RATE: 97 BPM

## 2024-12-18 DIAGNOSIS — R21 RASH AND OTHER NONSPECIFIC SKIN ERUPTION: Primary | ICD-10-CM

## 2024-12-18 DIAGNOSIS — L03.115 CELLULITIS OF RIGHT LOWER EXTREMITY: ICD-10-CM

## 2024-12-18 PROCEDURE — 6370000000 HC RX 637 (ALT 250 FOR IP): Performed by: EMERGENCY MEDICINE

## 2024-12-18 PROCEDURE — 99283 EMERGENCY DEPT VISIT LOW MDM: CPT

## 2024-12-18 RX ORDER — DOXYCYCLINE HYCLATE 100 MG
100 TABLET ORAL ONCE
Status: COMPLETED | OUTPATIENT
Start: 2024-12-18 | End: 2024-12-18

## 2024-12-18 RX ORDER — BACITRACIN ZINC AND POLYMYXIN B SULFATE 500; 10000 [USP'U]/G; [USP'U]/G
OINTMENT OPHTHALMIC
Qty: 1 EACH | Refills: 1 | Status: SHIPPED | OUTPATIENT
Start: 2024-12-18

## 2024-12-18 RX ORDER — DAPAGLIFLOZIN 10 MG/1
10 TABLET, FILM COATED ORAL EVERY MORNING
COMMUNITY
Start: 2024-05-22

## 2024-12-18 RX ORDER — GINSENG 100 MG
CAPSULE ORAL ONCE
Status: COMPLETED | OUTPATIENT
Start: 2024-12-18 | End: 2024-12-18

## 2024-12-18 RX ORDER — DOXYCYCLINE HYCLATE 100 MG
100 TABLET ORAL 2 TIMES DAILY
Qty: 14 TABLET | Refills: 0 | Status: SHIPPED | OUTPATIENT
Start: 2024-12-18 | End: 2024-12-25

## 2024-12-18 RX ADMIN — DOXYCYCLINE HYCLATE 100 MG: 100 TABLET, COATED ORAL at 09:52

## 2024-12-18 RX ADMIN — BACITRACIN: 500 OINTMENT TOPICAL at 09:53

## 2024-12-18 ASSESSMENT — PAIN DESCRIPTION - ORIENTATION: ORIENTATION: RIGHT

## 2024-12-18 ASSESSMENT — PAIN DESCRIPTION - FREQUENCY: FREQUENCY: CONTINUOUS

## 2024-12-18 ASSESSMENT — LIFESTYLE VARIABLES
HOW MANY STANDARD DRINKS CONTAINING ALCOHOL DO YOU HAVE ON A TYPICAL DAY: 1 OR 2
HOW OFTEN DO YOU HAVE A DRINK CONTAINING ALCOHOL: MONTHLY OR LESS

## 2024-12-18 ASSESSMENT — PAIN SCALES - GENERAL: PAINLEVEL_OUTOF10: 8

## 2024-12-18 ASSESSMENT — PAIN DESCRIPTION - DESCRIPTORS: DESCRIPTORS: THROBBING;BURNING

## 2024-12-18 ASSESSMENT — PAIN DESCRIPTION - LOCATION: LOCATION: ARM

## 2024-12-18 ASSESSMENT — PAIN DESCRIPTION - PAIN TYPE: TYPE: ACUTE PAIN

## 2024-12-18 NOTE — DISCHARGE INSTRUCTIONS
Take antibiotics as prescribed, and apply bacitracin twice daily. Follow up with your primary care provider in 3 days for wound check. Or return to ER sooner if worsening pain increased redness, drainage, or swelling occurs

## 2024-12-18 NOTE — ED PROVIDER NOTES
[facility] EMERGENCY DEPARTMENT  Emergency Department Encounter  Emergency Medicine      Pt Name:Shala Guerrero  MRN: [id number]  Birthdate 1960  Date of evaluation: 12/18/24  PCP:  Tawanda Boyce MD  9:14 AM EST      CHIEF COMPLAINT       Chief Complaint   Patient presents with    Foot Pain     right    Wound Check       HISTORY OF PRESENT ILLNESS  (Location/Symptom, Timing/Onset, Context/Setting, Quality, Duration, Modifying Factors, Severity.)      Shala Guerrero is a 63 y.o. female who presents with \"being attacked by fleas\" and has a wound to her Right lower leg which has been there for about a month, and now feels a wound starting on her left leg, she is a diabetic, has pcp appointment on 12/30 but having pain, and came into ER for evaluation.     PAST MEDICAL / SURGICAL / SOCIAL / FAMILY HISTORY      has a past medical history of Allergic rhinitis, Arthritis, Chronic back pain, Diverticulitis, GERD (gastroesophageal reflux disease), Headache(784.0), Hyperlipidemia, Hypertension, Knee pain, MDRO (multiple drug resistant organisms) resistance, Narcotic abuse (HCC), Obesity, Osteoarthritis, Rectovaginal fistula, Type II or unspecified type diabetes mellitus without mention of complication, not stated as uncontrolled, and Urinary incontinence.       has a past surgical history that includes Knee Arthroplasty; Abdomen surgery (5/1/16); ventral hernia repair (999042); Abdomen surgery (01/21/2014); and Abdominal exploration surgery (01/02/2017).      Social History     Socioeconomic History    Marital status: Single     Spouse name: Not on file    Number of children: Not on file    Years of education: Not on file    Highest education level: Not on file   Occupational History    Not on file   Tobacco Use    Smoking status: Every Day     Current packs/day: 1.00     Average packs/day: 1 pack/day for 35.2 years (35.2 ttl pk-yrs)     Types: Cigarettes     Start date: 10/24/1989    Smokeless tobacco:  Refills: 0      BACITRACIN-POLYMYXIN B, OPHTH, (AK-POLY-ANA MARÍA) OINT Apply to right leg wound twice daily until wound healed  Qty: 1 each, Refills: 1             Latonia Brown DO  Emergency Medicine     (Please note that portions of thisnote were completed with a voice recognition program.  Efforts were made to edit the dictations but occasionally words are mis-transcribed.)        Latonia Brown DO  12/21/24 [id number]

## 2024-12-18 NOTE — ED TRIAGE NOTES
Patient here for right lower ankle wound /rash concern from flee bites she reports   About 1 week ago the rash /wound on her right ankle stated to flare up, and the area is painful to touch   Patient reports she has pain with activity   Reports she diabetic,   Reports the pain 8/10, burring, painful sensation   She been using hydrogen peroxide and neosporin for treatment about

## 2024-12-18 NOTE — ED NOTES
Pt is A+Ox4  Pt complains of righ foot pain due to a flea bite  Pt ambulates with a steady gait from triage to the [facility]  Pt states she has been using Neosporin with no relief  Al questions answered and needs met at this time

## 2025-02-02 ENCOUNTER — HOSPITAL ENCOUNTER (EMERGENCY)
Age: 65
Discharge: HOME | End: 2025-02-02
Attending: EMERGENCY MEDICINE
Payer: COMMERCIAL

## 2025-02-02 VITALS
OXYGEN SATURATION: 96 % | RESPIRATION RATE: 20 BRPM | DIASTOLIC BLOOD PRESSURE: 79 MMHG | SYSTOLIC BLOOD PRESSURE: 138 MMHG | TEMPERATURE: 98.6 F | HEART RATE: 102 BPM

## 2025-02-02 DIAGNOSIS — R21 RASH AND OTHER NONSPECIFIC SKIN ERUPTION: Primary | ICD-10-CM

## 2025-02-02 LAB
ALBUMIN SERPL-MCNC: 3.7 G/DL (ref 3.5–5.2)
ALBUMIN/GLOB SERPL: 1 {RATIO} (ref 1–2.5)
ALP SERPL-CCNC: 176 U/L (ref 35–104)
ALT SERPL-CCNC: 9 U/L (ref 10–35)
ANION GAP SERPL CALCULATED.3IONS-SCNC: 9 MMOL/L (ref 9–16)
AST SERPL-CCNC: 15 U/L (ref 10–35)
BASOPHILS # BLD: 0.04 K/UL (ref 0–0.2)
BASOPHILS NFR BLD: 1 % (ref 0–2)
BILIRUB DIRECT SERPL-MCNC: <0.1 MG/DL (ref 0–0.2)
BILIRUB INDIRECT SERPL-MCNC: ABNORMAL MG/DL (ref 0–1)
BILIRUB SERPL-MCNC: 0.2 MG/DL (ref 0–1.2)
BNP SERPL-MCNC: <36 PG/ML (ref 0–125)
BUN SERPL-MCNC: 8 MG/DL (ref 8–23)
CALCIUM SERPL-MCNC: 8.6 MG/DL (ref 8.6–10.4)
CHLORIDE SERPL-SCNC: 102 MMOL/L (ref 98–107)
CO2 SERPL-SCNC: 27 MMOL/L (ref 20–31)
CREAT SERPL-MCNC: 0.7 MG/DL (ref 0.6–0.9)
EOSINOPHIL # BLD: 0.18 K/UL (ref 0–0.44)
EOSINOPHILS RELATIVE PERCENT: 2 % (ref 1–4)
ERYTHROCYTE [DISTWIDTH] IN BLOOD BY AUTOMATED COUNT: 14.1 % (ref 11.8–14.4)
GFR, ESTIMATED: >90 ML/MIN/1.73M2
GLUCOSE SERPL-MCNC: 212 MG/DL (ref 74–99)
HCT VFR BLD AUTO: 38.7 % (ref 36.3–47.1)
HGB BLD-MCNC: 12.3 G/DL (ref 11.9–15.1)
IMM GRANULOCYTES # BLD AUTO: 0.04 K/UL (ref 0–0.3)
IMM GRANULOCYTES NFR BLD: 1 %
LYMPHOCYTES NFR BLD: 1.96 K/UL (ref 1.1–3.7)
LYMPHOCYTES RELATIVE PERCENT: 24 % (ref 24–43)
MAGNESIUM SERPL-MCNC: 1.9 MG/DL (ref 1.6–2.4)
MCH RBC QN AUTO: 27.7 PG (ref 25.2–33.5)
MCHC RBC AUTO-ENTMCNC: 31.8 G/DL (ref 28.4–34.8)
MCV RBC AUTO: 87.2 FL (ref 82.6–102.9)
MONOCYTES NFR BLD: 0.51 K/UL (ref 0.1–1.2)
MONOCYTES NFR BLD: 6 % (ref 3–12)
NEUTROPHILS NFR BLD: 66 % (ref 36–65)
NEUTS SEG NFR BLD: 5.51 K/UL (ref 1.5–8.1)
NRBC BLD-RTO: 0 PER 100 WBC
PLATELET # BLD AUTO: 281 K/UL (ref 138–453)
PMV BLD AUTO: 10.7 FL (ref 8.1–13.5)
POTASSIUM SERPL-SCNC: 3.5 MMOL/L (ref 3.7–5.3)
PROT SERPL-MCNC: 7.5 G/DL (ref 6.6–8.7)
RBC # BLD AUTO: 4.44 M/UL (ref 3.95–5.11)
SODIUM SERPL-SCNC: 138 MMOL/L (ref 136–145)
WBC OTHER # BLD: 8.2 K/UL (ref 3.5–11.3)

## 2025-02-02 PROCEDURE — 83735 ASSAY OF MAGNESIUM: CPT

## 2025-02-02 PROCEDURE — 6370000000 HC RX 637 (ALT 250 FOR IP)

## 2025-02-02 PROCEDURE — 80053 COMPREHEN METABOLIC PANEL: CPT

## 2025-02-02 PROCEDURE — 93005 ELECTROCARDIOGRAM TRACING: CPT | Performed by: EMERGENCY MEDICINE

## 2025-02-02 PROCEDURE — 6370000000 HC RX 637 (ALT 250 FOR IP): Performed by: EMERGENCY MEDICINE

## 2025-02-02 PROCEDURE — 99284 EMERGENCY DEPT VISIT MOD MDM: CPT

## 2025-02-02 PROCEDURE — 83880 ASSAY OF NATRIURETIC PEPTIDE: CPT

## 2025-02-02 PROCEDURE — 82248 BILIRUBIN DIRECT: CPT

## 2025-02-02 PROCEDURE — 85025 COMPLETE CBC W/AUTO DIFF WBC: CPT

## 2025-02-02 RX ORDER — BENZOCAINE/MENTHOL 6 MG-10 MG
LOZENGE MUCOUS MEMBRANE 2 TIMES DAILY
Status: DISCONTINUED | OUTPATIENT
Start: 2025-02-02 | End: 2025-02-03 | Stop reason: HOSPADM

## 2025-02-02 RX ORDER — DIPHENHYDRAMINE HCL 25 MG
25 TABLET ORAL ONCE
Status: COMPLETED | OUTPATIENT
Start: 2025-02-02 | End: 2025-02-02

## 2025-02-02 RX ORDER — BENZOCAINE/MENTHOL 6 MG-10 MG
LOZENGE MUCOUS MEMBRANE
Qty: 20 G | Refills: 0 | Status: SHIPPED | OUTPATIENT
Start: 2025-02-02 | End: 2025-02-09

## 2025-02-02 RX ORDER — ACETAMINOPHEN 500 MG
1000 TABLET ORAL ONCE
Status: DISCONTINUED | OUTPATIENT
Start: 2025-02-02 | End: 2025-02-03 | Stop reason: HOSPADM

## 2025-02-02 RX ADMIN — DIPHENHYDRAMINE HYDROCHLORIDE 25 MG: 25 TABLET ORAL at 20:09

## 2025-02-02 RX ADMIN — HYDROCORTISONE: 10 CREAM TOPICAL at 22:05

## 2025-02-02 ASSESSMENT — PAIN SCALES - GENERAL: PAINLEVEL_OUTOF10: 10

## 2025-02-03 ASSESSMENT — ENCOUNTER SYMPTOMS
COUGH: 0
SHORTNESS OF BREATH: 0
ABDOMINAL PAIN: 0

## 2025-02-03 NOTE — ED NOTES
Pt presents to the ER via triage. Pt c/o rash & swelling with some weeping edema to her lower legs. Pt states it started on R. Lower leg but now on L. Lower leg as well. Pt otherwise A&O x4, in NAD, VSS.

## 2025-02-03 NOTE — ED PROVIDER NOTES
[facility]     Emergency Department     Faculty Attestation    I performed a history and physical examination of the patient and discussed management with the resident. I reviewed the resident’s note and agree with the documented findings including all diagnostic interpretations and plan of care. Any areas of disagreement are noted on the chart. I was personally present for the key portions of any procedures. I have documented in the chart those procedures where I was not present during the key portions. I have reviewed the emergency nurses triage note. I agree with the chief complaint, past medical history, past surgical history, allergies, medications, social and family history as documented unless otherwise noted below. Documentation of the HPI, Physical Exam and Medical Decision Making performed by [email or web address] is based on my personal performance of the HPI, PE and MDM. For Physician Assistant/ Nurse Practitioner cases/documentation I have personally evaluated this patient and have completed at least one if not all key elements of the E/M (history, physical exam, and MDM). Additional findings are as noted.    Primary Care Physician: Tawanda Boyce MD    Note Started: 10:07 PM EST     VITAL SIGNS:   temperature is 98.6 °F (37 °C). Her blood pressure is 138/79 and her pulse is 102 (abnormal). Her respiration is 20 and oxygen saturation is 96%.      Medical Decision Making  Rash to shins bilaterally.  Has had leg swelling has been ongoing the past 6 months was seen and placed on topical antibiotics initially and then PCP placed her on antifungals without significant improvement.  No history of CHF no history of kidney disease.  Patient has erythematous area on the anterior aspect of the shins with scaling and loss of superficial epidermis.  There is also some weeping noted.  Believe this is consistent with a stasis dermatitis rather than infectious etiology.

## 2025-02-03 NOTE — DISCHARGE INSTRUCTIONS
Call today or tomorrow to follow up with Tawanda Boyce MD  in 2 days.  Please follow-up with Dr. Munson, the dermatologist, for her opinion about your rash.  You have been prescribed the hydrocortisone cream please please apply it as prescribed.  You are advised to wear compression stockings to help with the swelling.    Take your medication as prescribed.  Keep the area cleaned with soap and water.    Take the medication as prescribed for the itching or use Benadryl 25 - 50 milligrams (1 - 2 tabs) every 6 hours for the next 24 - 48 hours.    Return to the Emergency Department for fever > 101.5, notice burn type appearance to your skin with skin coming off, white drainage from any of the wounds, redness streaking, any other care or concern.

## 2025-02-03 NOTE — ED PROVIDER NOTES
[facility] EMERGENCY DEPARTMENT  Emergency Department Encounter  Emergency Medicine Resident     Pt Name:Shala Guerrero  MRN: [id number]  Birthdate 1960  Date of evaluation: 2/2/25  PCP:  Tawanda Boyce MD  Note Started: 9:34 PM EST      CHIEF COMPLAINT       Chief Complaint   Patient presents with    Rash    Leg Swelling       HISTORY OF PRESENT ILLNESS  (Location/Symptom, Timing/Onset, Context/Setting, Quality, Duration, Modifying Factors, Severity.)      Shala Guerrero is a 64 y.o. female who presents with rash and swelling to her legs.  Patient reports that this has been present for about 2 months now and has been seen in the ER and is frustrated as her rash has not resolved.  She reports that she had some improvement in her rash after being seen by her family doctor however has noticed that the rash has now reappeared and is worse than it was in the past.  She has been doing dressings at home as she has noticed that the rash has been weeping.  She is also concerned and complaining of bilateral lower extremity swelling.  She reports that the rash hurts.  She has not been taking any analgesics at home.  She denies any fever, chills, sweats, nausea, vomiting, abdominal pain.  She is a diabetic and is compliant with her medications.  Reports that her sugars usually run in the 120s    PAST MEDICAL / SURGICAL / SOCIAL / FAMILY HISTORY      has a past medical history of Allergic rhinitis, Arthritis, Chronic back pain, Diverticulitis, GERD (gastroesophageal reflux disease), Headache(784.0), Hyperlipidemia, Hypertension, Knee pain, MDRO (multiple drug resistant organisms) resistance, Narcotic abuse (HCC), Obesity, Osteoarthritis, Rectovaginal fistula, Type II or unspecified type diabetes mellitus without mention of complication, not stated as uncontrolled, and Urinary incontinence.     has a past surgical history that includes Knee Arthroplasty; Abdomen surgery (5/1/16); ventral hernia repair

## 2025-02-04 LAB
EKG ATRIAL RATE: 90 BPM
EKG P AXIS: 53 DEGREES
EKG P-R INTERVAL: 150 MS
EKG Q-T INTERVAL: 376 MS
EKG QRS DURATION: 68 MS
EKG QTC CALCULATION (BAZETT): 459 MS
EKG R AXIS: 22 DEGREES
EKG T AXIS: 57 DEGREES
EKG VENTRICULAR RATE: 90 BPM

## 2025-02-04 PROCEDURE — 93010 ELECTROCARDIOGRAM REPORT: CPT | Performed by: INTERNAL MEDICINE
